# Patient Record
Sex: FEMALE | Race: WHITE | NOT HISPANIC OR LATINO | Employment: FULL TIME | URBAN - METROPOLITAN AREA
[De-identification: names, ages, dates, MRNs, and addresses within clinical notes are randomized per-mention and may not be internally consistent; named-entity substitution may affect disease eponyms.]

---

## 2019-06-12 ENCOUNTER — CLINICAL SUPPORT (OUTPATIENT)
Dept: URGENT CARE | Facility: CLINIC | Age: 59
End: 2019-06-12

## 2019-06-12 VITALS
BODY MASS INDEX: 28.93 KG/M2 | OXYGEN SATURATION: 97 % | TEMPERATURE: 98 F | WEIGHT: 180 LBS | HEIGHT: 66 IN | DIASTOLIC BLOOD PRESSURE: 102 MMHG | HEART RATE: 80 BPM | SYSTOLIC BLOOD PRESSURE: 184 MMHG | RESPIRATION RATE: 17 BRPM

## 2019-06-12 DIAGNOSIS — J32.4 CHRONIC PANSINUSITIS: Primary | ICD-10-CM

## 2019-06-12 PROCEDURE — 96372 PR INJECTION,THERAP/PROPH/DIAG2ST, IM OR SUBCUT: ICD-10-PCS | Mod: S$GLB,,, | Performed by: NURSE PRACTITIONER

## 2019-06-12 PROCEDURE — 99203 OFFICE O/P NEW LOW 30 MIN: CPT | Mod: 25,S$GLB,, | Performed by: NURSE PRACTITIONER

## 2019-06-12 PROCEDURE — 96372 THER/PROPH/DIAG INJ SC/IM: CPT | Mod: S$GLB,,, | Performed by: NURSE PRACTITIONER

## 2019-06-12 PROCEDURE — 99203 PR OFFICE/OUTPT VISIT, NEW, LEVL III, 30-44 MIN: ICD-10-PCS | Mod: 25,S$GLB,, | Performed by: NURSE PRACTITIONER

## 2019-06-12 RX ORDER — OMEPRAZOLE 40 MG/1
40 CAPSULE, DELAYED RELEASE ORAL DAILY
Status: ON HOLD | COMMUNITY
End: 2023-01-05 | Stop reason: HOSPADM

## 2019-06-12 RX ORDER — ATENOLOL 100 MG/1
100 TABLET ORAL DAILY
Status: ON HOLD | COMMUNITY
End: 2023-01-05 | Stop reason: HOSPADM

## 2019-06-12 RX ORDER — CEFTRIAXONE 1 G/1
1 INJECTION, POWDER, FOR SOLUTION INTRAMUSCULAR; INTRAVENOUS
Status: COMPLETED | OUTPATIENT
Start: 2019-06-12 | End: 2019-06-12

## 2019-06-12 RX ORDER — CIPROFLOXACIN 500 MG/1
500 TABLET ORAL EVERY 12 HOURS
Qty: 14 TABLET | Refills: 0 | Status: SHIPPED | OUTPATIENT
Start: 2019-06-12 | End: 2019-06-19

## 2019-06-12 RX ORDER — LISINOPRIL 30 MG/1
30 TABLET ORAL DAILY
Status: ON HOLD | COMMUNITY
End: 2023-01-05 | Stop reason: HOSPADM

## 2019-06-12 RX ORDER — GLIPIZIDE 5 MG/1
5 TABLET ORAL
Status: ON HOLD | COMMUNITY
End: 2023-01-05 | Stop reason: HOSPADM

## 2019-06-12 RX ADMIN — CEFTRIAXONE 1 G: 1 INJECTION, POWDER, FOR SOLUTION INTRAMUSCULAR; INTRAVENOUS at 05:06

## 2019-06-12 NOTE — PROGRESS NOTES
"Subjective:       Patient ID: Doris Hoover is a 58 y.o. female.    Vitals:  height is 5' 5.5" (1.664 m) and weight is 81.6 kg (180 lb). Her temperature is 97.7 °F (36.5 °C). Her blood pressure is 184/102 (abnormal) and her pulse is 80. Her respiration is 17 and oxygen saturation is 97%.     Chief Complaint: Sinusitis    Sinusitis   This is a new problem. Episode onset: Monday. The problem has been rapidly worsening since onset. Associated symptoms include congestion, ear pain, headaches, sinus pressure and a sore throat. Pertinent negatives include no chills, coughing or shortness of breath.       Constitution: Positive for fatigue. Negative for chills and fever.   HENT: Positive for ear pain, congestion, postnasal drip, sinus pain, sinus pressure and sore throat.    Neck: Negative for painful lymph nodes.   Cardiovascular: Negative for chest pain and leg swelling.   Eyes: Negative for double vision and blurred vision.   Respiratory: Negative for cough and shortness of breath.    Gastrointestinal: Negative for nausea, vomiting and diarrhea.   Genitourinary: Negative for dysuria, frequency, urgency and history of kidney stones.   Musculoskeletal: Negative for joint pain, joint swelling, muscle cramps and muscle ache.   Skin: Negative for color change, pale, rash and bruising.   Allergic/Immunologic: Negative for seasonal allergies.   Neurological: Positive for headaches. Negative for dizziness, history of vertigo, light-headedness and passing out.   Hematologic/Lymphatic: Negative for swollen lymph nodes.   Psychiatric/Behavioral: Negative for nervous/anxious, sleep disturbance and depression. The patient is not nervous/anxious.        Objective:      Physical Exam   Constitutional: She is oriented to person, place, and time. Vital signs are normal. She appears well-developed and well-nourished. She is cooperative.   HENT:   Head: Normocephalic.   Right Ear: Hearing, external ear and ear canal normal. A middle " ear effusion is present.   Left Ear: Hearing, external ear and ear canal normal. A middle ear effusion is present.   Nose: Mucosal edema and rhinorrhea present. Right sinus exhibits maxillary sinus tenderness. Left sinus exhibits maxillary sinus tenderness.   Mouth/Throat: Uvula is midline and mucous membranes are normal. Posterior oropharyngeal erythema present.   Eyes: Pupils are equal, round, and reactive to light. Conjunctivae, EOM and lids are normal.   Neck: Trachea normal, normal range of motion, full passive range of motion without pain and phonation normal. Neck supple.   Cardiovascular: Normal rate, regular rhythm, normal heart sounds, intact distal pulses and normal pulses.   Pulmonary/Chest: Effort normal and breath sounds normal.   Abdominal: Soft. Normal appearance, normal aorta and bowel sounds are normal. There is no tenderness.   Musculoskeletal: Normal range of motion.   Neurological: She is alert and oriented to person, place, and time. She has normal strength. GCS eye subscore is 4. GCS verbal subscore is 5. GCS motor subscore is 6.   Skin: Skin is warm, dry and intact. Capillary refill takes less than 2 seconds.   Psychiatric: She has a normal mood and affect. Her speech is normal and behavior is normal. Judgment and thought content normal. Cognition and memory are normal.       Assessment:       1. Chronic pansinusitis        Plan:     hx of chronic sinusitis, typically treated with rocephin and cipro. Has close follow up with ENT next week to discuss surgery    Chronic pansinusitis    Other orders  -     cefTRIAXone injection 1 g  -     ciprofloxacin HCl (CIPRO) 500 MG tablet; Take 1 tablet (500 mg total) by mouth every 12 (twelve) hours. for 7 days  Dispense: 14 tablet; Refill: 0

## 2019-06-12 NOTE — PATIENT INSTRUCTIONS
Chronic Sinusitis  Chronic sinusitis is a long-term swelling or infection of the sinuses. If sinusitis lasts more than 12 weeks, it is called chronic.    What is chronic sinusitis?  Sinuses are air-filled spaces in the skull behind the face. They are kept moist and clean by a lining of mucosa. Things such as pollen, smoke, and chemical fumes can irritate the mucosa. Constant exposure to irritants can cause ongoing inflammation of this lining. It can also damage tiny hairlike cilia that cover the mucosa. Cilia help carry mucus toward the opening of the sinus. Damage to cilia keeps mucus from draining from the sinuses.  Causes of chronic sinusitis  Problems that irritate the mucosa or block drainage can lead to chronic sinusitis. These may include:  · Infections  · Chronic allergies  · Nasal polyps, deviated septum, or other blockages  · Constant exposure to irritants, such as cigarette smoke or fumes  · Asthma  · Acute sinusitis that keeps coming back  Common symptoms of chronic sinusitis  Symptoms may include:  · Facial pain and pressure  · Headache and sinus pain  · Nasal congestion  · Thick, colored drainage from the nose  · Thick mucus draining down the back of the throat (postnasal drainage)  · Loss of smell  · Fever  · Cough  · Sore throat  Diagnosing chronic sinusitis  Your doctor will ask about your symptoms and health history. The doctor will look at your nose and face. You may have imaging studies such as an X-ray or CT scan of the sinuses. Your doctor may also take a sample of the drainage to check for bacteria. You may also have an endoscopy. During this test, the doctor puts a lighted tube through your nose up into your sinuses to look at the sinuses.  Treating chronic sinusitis  Treatment involves reducing irritation and inflammation. Your plan may include:  · Taking medicines. Your doctor may prescribe medicines to reduce the amount of mucus and swelling. These help unblock the sinuses and allow them  to drain. You will need to take antibiotics if you have a bacterial infection.  · Flushing your sinuses. Your doctor may suggest sinus irrigation. This is flushing your sinuses with saltwater or saline solution. This helps to clear out mucus.  · Controlling allergies. If you have allergies, you should have a plan to help control them. This plan may include medicines or allergy shots.  · Having surgery. In some cases, you may need surgery on the nose, sinuses, or both. This can improve sinus drainage or remove nasal blockages.  Date Last Reviewed: 10/1/2016  © 4152-2827 [employer]. [address] [place] [address], [place] [address]. All rights reserved. This information is not intended as a substitute for professional medical care. Always follow your healthcare professional's instructions.

## 2021-06-03 ENCOUNTER — OFFICE VISIT (OUTPATIENT)
Dept: URGENT CARE | Facility: CLINIC | Age: 61
End: 2021-06-03
Payer: COMMERCIAL

## 2021-06-03 VITALS
SYSTOLIC BLOOD PRESSURE: 136 MMHG | BODY MASS INDEX: 29.16 KG/M2 | HEIGHT: 65 IN | TEMPERATURE: 98 F | OXYGEN SATURATION: 97 % | DIASTOLIC BLOOD PRESSURE: 88 MMHG | HEART RATE: 87 BPM | WEIGHT: 175 LBS | RESPIRATION RATE: 17 BRPM

## 2021-06-03 DIAGNOSIS — J32.0 MAXILLARY SINUSITIS, UNSPECIFIED CHRONICITY: Primary | ICD-10-CM

## 2021-06-03 PROBLEM — I34.1 MVP (MITRAL VALVE PROLAPSE): Status: ACTIVE | Noted: 2017-11-15

## 2021-06-03 PROBLEM — M25.511 ACUTE PAIN OF RIGHT SHOULDER: Status: ACTIVE | Noted: 2020-08-28

## 2021-06-03 PROBLEM — E11.9 TYPE 2 DIABETES MELLITUS, WITHOUT LONG-TERM CURRENT USE OF INSULIN: Status: ACTIVE | Noted: 2017-11-15

## 2021-06-03 PROBLEM — I10 ESSENTIAL HYPERTENSION: Status: ACTIVE | Noted: 2017-11-15

## 2021-06-03 PROBLEM — K21.9 GASTROESOPHAGEAL REFLUX DISEASE WITHOUT ESOPHAGITIS: Status: ACTIVE | Noted: 2017-11-15

## 2021-06-03 PROBLEM — J01.00 ACUTE MAXILLARY SINUSITIS: Status: ACTIVE | Noted: 2020-08-28

## 2021-06-03 PROCEDURE — 3008F BODY MASS INDEX DOCD: CPT | Mod: S$GLB,,, | Performed by: NURSE PRACTITIONER

## 2021-06-03 PROCEDURE — 99214 OFFICE O/P EST MOD 30 MIN: CPT | Mod: S$GLB,,, | Performed by: NURSE PRACTITIONER

## 2021-06-03 PROCEDURE — 99214 PR OFFICE/OUTPT VISIT, EST, LEVL IV, 30-39 MIN: ICD-10-PCS | Mod: S$GLB,,, | Performed by: NURSE PRACTITIONER

## 2021-06-03 PROCEDURE — 3008F PR BODY MASS INDEX (BMI) DOCUMENTED: ICD-10-PCS | Mod: S$GLB,,, | Performed by: NURSE PRACTITIONER

## 2021-06-03 RX ORDER — AMLODIPINE BESYLATE 10 MG/1
10 TABLET ORAL DAILY
COMMUNITY
Start: 2020-07-13

## 2021-06-03 RX ORDER — CIPROFLOXACIN 500 MG/1
500 TABLET ORAL EVERY 12 HOURS
Qty: 20 TABLET | Refills: 0 | Status: ON HOLD | OUTPATIENT
Start: 2021-06-03 | End: 2023-01-05 | Stop reason: HOSPADM

## 2021-06-03 RX ORDER — SEMAGLUTIDE 1.34 MG/ML
1 INJECTION, SOLUTION SUBCUTANEOUS
COMMUNITY
Start: 2020-08-28

## 2021-07-27 DIAGNOSIS — Z12.31 ENCOUNTER FOR SCREENING MAMMOGRAM FOR MALIGNANT NEOPLASM OF BREAST: Primary | ICD-10-CM

## 2021-08-15 ENCOUNTER — HOSPITAL ENCOUNTER (EMERGENCY)
Facility: HOSPITAL | Age: 61
Discharge: HOME | End: 2021-08-15
Attending: EMERGENCY MEDICINE
Payer: COMMERCIAL

## 2021-08-15 VITALS
RESPIRATION RATE: 16 BRPM | DIASTOLIC BLOOD PRESSURE: 68 MMHG | WEIGHT: 175 LBS | TEMPERATURE: 98 F | OXYGEN SATURATION: 98 % | BODY MASS INDEX: 28.12 KG/M2 | SYSTOLIC BLOOD PRESSURE: 128 MMHG | HEART RATE: 84 BPM | HEIGHT: 66 IN

## 2021-08-15 DIAGNOSIS — S20.219A CHEST WALL CONTUSION, UNSPECIFIED LATERALITY, INITIAL ENCOUNTER: Primary | ICD-10-CM

## 2021-08-15 LAB
ALBUMIN SERPL BCP-MCNC: 4.2 G/DL (ref 3.5–5.2)
ALP SERPL-CCNC: 80 U/L (ref 55–135)
ALT SERPL W/O P-5'-P-CCNC: 12 U/L (ref 10–44)
ANION GAP SERPL CALC-SCNC: 7 MMOL/L (ref 8–16)
AST SERPL-CCNC: 19 U/L (ref 10–40)
BASOPHILS # BLD AUTO: 0.03 K/UL (ref 0–0.2)
BASOPHILS NFR BLD: 0.4 % (ref 0–1.9)
BILIRUB SERPL-MCNC: 0.9 MG/DL (ref 0.1–1)
BILIRUB UR QL STRIP: NEGATIVE
BUN SERPL-MCNC: 15 MG/DL (ref 8–23)
CALCIUM SERPL-MCNC: 9.3 MG/DL (ref 8.7–10.5)
CHLORIDE SERPL-SCNC: 109 MMOL/L (ref 95–110)
CLARITY UR: CLEAR
CO2 SERPL-SCNC: 26 MMOL/L (ref 23–29)
COLOR UR: YELLOW
CREAT SERPL-MCNC: 1 MG/DL (ref 0.5–1.4)
DIFFERENTIAL METHOD: NORMAL
EOSINOPHIL # BLD AUTO: 0.1 K/UL (ref 0–0.5)
EOSINOPHIL NFR BLD: 1.4 % (ref 0–8)
ERYTHROCYTE [DISTWIDTH] IN BLOOD BY AUTOMATED COUNT: 13.5 % (ref 11.5–14.5)
EST. GFR  (AFRICAN AMERICAN): >60 ML/MIN/1.73 M^2
EST. GFR  (NON AFRICAN AMERICAN): >60 ML/MIN/1.73 M^2
GLUCOSE SERPL-MCNC: 93 MG/DL (ref 70–110)
GLUCOSE UR QL STRIP: NEGATIVE
HCT VFR BLD AUTO: 39.4 % (ref 37–48.5)
HGB BLD-MCNC: 12.7 G/DL (ref 12–16)
HGB UR QL STRIP: NEGATIVE
IMM GRANULOCYTES # BLD AUTO: 0.02 K/UL (ref 0–0.04)
IMM GRANULOCYTES NFR BLD AUTO: 0.2 % (ref 0–0.5)
KETONES UR QL STRIP: NEGATIVE
LEUKOCYTE ESTERASE UR QL STRIP: NEGATIVE
LYMPHOCYTES # BLD AUTO: 3.4 K/UL (ref 1–4.8)
LYMPHOCYTES NFR BLD: 40.2 % (ref 18–48)
MCH RBC QN AUTO: 29.4 PG (ref 27–31)
MCHC RBC AUTO-ENTMCNC: 32.2 G/DL (ref 32–36)
MCV RBC AUTO: 91 FL (ref 82–98)
MONOCYTES # BLD AUTO: 0.8 K/UL (ref 0.3–1)
MONOCYTES NFR BLD: 9.6 % (ref 4–15)
NEUTROPHILS # BLD AUTO: 4.1 K/UL (ref 1.8–7.7)
NEUTROPHILS NFR BLD: 48.2 % (ref 38–73)
NITRITE UR QL STRIP: NEGATIVE
NRBC BLD-RTO: 0 /100 WBC
PH UR STRIP: 7 [PH] (ref 5–8)
PLATELET # BLD AUTO: 250 K/UL (ref 150–450)
PMV BLD AUTO: 10.9 FL (ref 9.2–12.9)
POTASSIUM SERPL-SCNC: 3.8 MMOL/L (ref 3.5–5.1)
PROT SERPL-MCNC: 7.4 G/DL (ref 6–8.4)
PROT UR QL STRIP: NEGATIVE
RBC # BLD AUTO: 4.32 M/UL (ref 4–5.4)
SODIUM SERPL-SCNC: 142 MMOL/L (ref 136–145)
SP GR UR STRIP: 1.02 (ref 1–1.03)
URN SPEC COLLECT METH UR: NORMAL
UROBILINOGEN UR STRIP-ACNC: NEGATIVE EU/DL
WBC # BLD AUTO: 8.48 K/UL (ref 3.9–12.7)

## 2021-08-15 PROCEDURE — 25000003 PHARM REV CODE 250: Performed by: EMERGENCY MEDICINE

## 2021-08-15 PROCEDURE — 81003 URINALYSIS AUTO W/O SCOPE: CPT | Performed by: EMERGENCY MEDICINE

## 2021-08-15 PROCEDURE — 25500020 PHARM REV CODE 255: Performed by: EMERGENCY MEDICINE

## 2021-08-15 PROCEDURE — 99285 EMERGENCY DEPT VISIT HI MDM: CPT

## 2021-08-15 PROCEDURE — 85025 COMPLETE CBC W/AUTO DIFF WBC: CPT | Performed by: EMERGENCY MEDICINE

## 2021-08-15 PROCEDURE — 80053 COMPREHEN METABOLIC PANEL: CPT | Performed by: EMERGENCY MEDICINE

## 2021-08-15 RX ORDER — METHOCARBAMOL 500 MG/1
500 TABLET, FILM COATED ORAL EVERY 8 HOURS PRN
Qty: 8 TABLET | Refills: 0 | Status: SHIPPED | OUTPATIENT
Start: 2021-08-15 | End: 2021-08-15 | Stop reason: ALTCHOICE

## 2021-08-15 RX ORDER — ACETAMINOPHEN 500 MG
1000 TABLET ORAL
Status: COMPLETED | OUTPATIENT
Start: 2021-08-15 | End: 2021-08-15

## 2021-08-15 RX ORDER — MULTIVITAMIN
1 TABLET ORAL DAILY
COMMUNITY

## 2021-08-15 RX ORDER — PANTOPRAZOLE SODIUM 40 MG/1
40 TABLET, DELAYED RELEASE ORAL DAILY
COMMUNITY

## 2021-08-15 RX ORDER — VITAMIN B COMPLEX
1 CAPSULE ORAL DAILY
Status: ON HOLD | COMMUNITY
End: 2023-01-05 | Stop reason: HOSPADM

## 2021-08-15 RX ORDER — METHOCARBAMOL 500 MG/1
500 TABLET, FILM COATED ORAL EVERY 8 HOURS PRN
Qty: 8 TABLET | Refills: 0 | Status: SHIPPED | OUTPATIENT
Start: 2021-08-15 | End: 2021-08-20

## 2021-08-15 RX ORDER — TIZANIDINE 4 MG/1
4 TABLET ORAL EVERY 8 HOURS
Status: ON HOLD | COMMUNITY
End: 2023-01-05 | Stop reason: HOSPADM

## 2021-08-15 RX ORDER — GLIMEPIRIDE 4 MG/1
4 TABLET ORAL
Status: ON HOLD | COMMUNITY
End: 2023-01-05 | Stop reason: HOSPADM

## 2021-08-15 RX ORDER — DEXTROMETHORPHAN HYDROBROMIDE, GUAIFENESIN 5; 100 MG/5ML; MG/5ML
650 LIQUID ORAL EVERY 8 HOURS
COMMUNITY

## 2021-08-15 RX ADMIN — ACETAMINOPHEN 1000 MG: 500 TABLET ORAL at 06:08

## 2021-08-15 RX ADMIN — IOHEXOL 100 ML: 350 INJECTION, SOLUTION INTRAVENOUS at 07:08

## 2021-09-06 PROBLEM — J01.00 ACUTE MAXILLARY SINUSITIS: Status: RESOLVED | Noted: 2020-08-28 | Resolved: 2021-09-06

## 2021-10-10 ENCOUNTER — OFFICE VISIT (OUTPATIENT)
Dept: URGENT CARE | Facility: CLINIC | Age: 61
End: 2021-10-10
Payer: COMMERCIAL

## 2021-10-10 VITALS
BODY MASS INDEX: 28.12 KG/M2 | OXYGEN SATURATION: 98 % | WEIGHT: 175 LBS | TEMPERATURE: 98 F | SYSTOLIC BLOOD PRESSURE: 152 MMHG | HEIGHT: 66 IN | HEART RATE: 74 BPM | DIASTOLIC BLOOD PRESSURE: 90 MMHG

## 2021-10-10 DIAGNOSIS — J01.11 ACUTE RECURRENT FRONTAL SINUSITIS: Primary | ICD-10-CM

## 2021-10-10 PROCEDURE — 4010F ACE/ARB THERAPY RXD/TAKEN: CPT | Mod: S$GLB,,, | Performed by: NURSE PRACTITIONER

## 2021-10-10 PROCEDURE — 1159F MED LIST DOCD IN RCRD: CPT | Mod: S$GLB,,, | Performed by: NURSE PRACTITIONER

## 2021-10-10 PROCEDURE — 96372 THER/PROPH/DIAG INJ SC/IM: CPT | Mod: S$GLB,,, | Performed by: NURSE PRACTITIONER

## 2021-10-10 PROCEDURE — 3077F PR MOST RECENT SYSTOLIC BLOOD PRESSURE >= 140 MM HG: ICD-10-PCS | Mod: S$GLB,,, | Performed by: NURSE PRACTITIONER

## 2021-10-10 PROCEDURE — 96372 PR INJECTION,THERAP/PROPH/DIAG2ST, IM OR SUBCUT: ICD-10-PCS | Mod: S$GLB,,, | Performed by: NURSE PRACTITIONER

## 2021-10-10 PROCEDURE — 99213 OFFICE O/P EST LOW 20 MIN: CPT | Mod: 25,S$GLB,, | Performed by: NURSE PRACTITIONER

## 2021-10-10 PROCEDURE — 3008F PR BODY MASS INDEX (BMI) DOCUMENTED: ICD-10-PCS | Mod: S$GLB,,, | Performed by: NURSE PRACTITIONER

## 2021-10-10 PROCEDURE — 3077F SYST BP >= 140 MM HG: CPT | Mod: S$GLB,,, | Performed by: NURSE PRACTITIONER

## 2021-10-10 PROCEDURE — 3008F BODY MASS INDEX DOCD: CPT | Mod: S$GLB,,, | Performed by: NURSE PRACTITIONER

## 2021-10-10 PROCEDURE — 99213 PR OFFICE/OUTPT VISIT, EST, LEVL III, 20-29 MIN: ICD-10-PCS | Mod: 25,S$GLB,, | Performed by: NURSE PRACTITIONER

## 2021-10-10 PROCEDURE — 1160F RVW MEDS BY RX/DR IN RCRD: CPT | Mod: S$GLB,,, | Performed by: NURSE PRACTITIONER

## 2021-10-10 PROCEDURE — 3080F DIAST BP >= 90 MM HG: CPT | Mod: S$GLB,,, | Performed by: NURSE PRACTITIONER

## 2021-10-10 PROCEDURE — 3080F PR MOST RECENT DIASTOLIC BLOOD PRESSURE >= 90 MM HG: ICD-10-PCS | Mod: S$GLB,,, | Performed by: NURSE PRACTITIONER

## 2021-10-10 PROCEDURE — 1160F PR REVIEW ALL MEDS BY PRESCRIBER/CLIN PHARMACIST DOCUMENTED: ICD-10-PCS | Mod: S$GLB,,, | Performed by: NURSE PRACTITIONER

## 2021-10-10 PROCEDURE — 4010F PR ACE/ARB THEARPY RXD/TAKEN: ICD-10-PCS | Mod: S$GLB,,, | Performed by: NURSE PRACTITIONER

## 2021-10-10 PROCEDURE — 1159F PR MEDICATION LIST DOCUMENTED IN MEDICAL RECORD: ICD-10-PCS | Mod: S$GLB,,, | Performed by: NURSE PRACTITIONER

## 2021-10-10 RX ORDER — CEFTRIAXONE 1 G/1
1 INJECTION, POWDER, FOR SOLUTION INTRAMUSCULAR; INTRAVENOUS
Status: COMPLETED | OUTPATIENT
Start: 2021-10-10 | End: 2021-10-10

## 2021-10-10 RX ORDER — DOXYCYCLINE HYCLATE 100 MG
100 TABLET ORAL 2 TIMES DAILY
Qty: 20 TABLET | Refills: 0 | Status: SHIPPED | OUTPATIENT
Start: 2021-10-10 | End: 2021-10-20

## 2021-10-10 RX ADMIN — CEFTRIAXONE 1 G: 1 INJECTION, POWDER, FOR SOLUTION INTRAMUSCULAR; INTRAVENOUS at 10:10

## 2022-12-30 ENCOUNTER — HOSPITAL ENCOUNTER (INPATIENT)
Facility: HOSPITAL | Age: 62
LOS: 6 days | Discharge: HOME | DRG: 193 | End: 2023-01-05
Attending: EMERGENCY MEDICINE | Admitting: INTERNAL MEDICINE
Payer: COMMERCIAL

## 2022-12-30 DIAGNOSIS — Z99.81 HYPOXEMIA REQUIRING SUPPLEMENTAL OXYGEN: ICD-10-CM

## 2022-12-30 DIAGNOSIS — J18.9 PNEUMONIA OF RIGHT UPPER LOBE DUE TO INFECTIOUS ORGANISM: ICD-10-CM

## 2022-12-30 DIAGNOSIS — J18.9 PNEUMONIA DUE TO INFECTIOUS ORGANISM, UNSPECIFIED LATERALITY, UNSPECIFIED PART OF LUNG: Primary | ICD-10-CM

## 2022-12-30 DIAGNOSIS — J18.9 PNEUMONIA OF RIGHT LUNG DUE TO INFECTIOUS ORGANISM, UNSPECIFIED PART OF LUNG: ICD-10-CM

## 2022-12-30 DIAGNOSIS — R09.02 HYPOXEMIA REQUIRING SUPPLEMENTAL OXYGEN: ICD-10-CM

## 2022-12-30 DIAGNOSIS — R50.9 FEVER: ICD-10-CM

## 2022-12-30 DIAGNOSIS — E11.9 TYPE 2 DIABETES MELLITUS WITHOUT COMPLICATION, WITHOUT LONG-TERM CURRENT USE OF INSULIN: ICD-10-CM

## 2022-12-30 PROBLEM — J96.01 ACUTE HYPOXEMIC RESPIRATORY FAILURE: Status: ACTIVE | Noted: 2022-12-30

## 2022-12-30 LAB
ALBUMIN SERPL BCP-MCNC: 3.6 G/DL (ref 3.5–5.2)
ALBUMIN SERPL BCP-MCNC: 3.6 G/DL (ref 3.5–5.2)
ALP SERPL-CCNC: 65 U/L (ref 55–135)
ALP SERPL-CCNC: 65 U/L (ref 55–135)
ALT SERPL W/O P-5'-P-CCNC: 16 U/L (ref 10–44)
ALT SERPL W/O P-5'-P-CCNC: 16 U/L (ref 10–44)
AMPHET+METHAMPHET UR QL: NEGATIVE
ANION GAP SERPL CALC-SCNC: 9 MMOL/L (ref 8–16)
ANION GAP SERPL CALC-SCNC: 9 MMOL/L (ref 8–16)
AST SERPL-CCNC: 27 U/L (ref 10–40)
AST SERPL-CCNC: 27 U/L (ref 10–40)
BACTERIA #/AREA URNS HPF: NEGATIVE /HPF
BARBITURATES UR QL SCN>200 NG/ML: NEGATIVE
BASOPHILS # BLD AUTO: 0.03 K/UL (ref 0–0.2)
BASOPHILS # BLD AUTO: 0.03 K/UL (ref 0–0.2)
BASOPHILS NFR BLD: 0.4 % (ref 0–1.9)
BASOPHILS NFR BLD: 0.4 % (ref 0–1.9)
BENZODIAZ UR QL SCN>200 NG/ML: NEGATIVE
BILIRUB SERPL-MCNC: 1 MG/DL (ref 0.1–1)
BILIRUB SERPL-MCNC: 1 MG/DL (ref 0.1–1)
BILIRUB UR QL STRIP: NEGATIVE
BNP SERPL-MCNC: 250 PG/ML (ref 0–99)
BUN SERPL-MCNC: 15 MG/DL (ref 8–23)
BUN SERPL-MCNC: 15 MG/DL (ref 8–23)
BZE UR QL SCN: NEGATIVE
CALCIUM SERPL-MCNC: 8.4 MG/DL (ref 8.7–10.5)
CALCIUM SERPL-MCNC: 8.4 MG/DL (ref 8.7–10.5)
CANNABINOIDS UR QL SCN: NEGATIVE
CHLORIDE SERPL-SCNC: 97 MMOL/L (ref 95–110)
CHLORIDE SERPL-SCNC: 97 MMOL/L (ref 95–110)
CK SERPL-CCNC: 84 U/L (ref 20–180)
CLARITY UR: CLEAR
CO2 SERPL-SCNC: 26 MMOL/L (ref 23–29)
CO2 SERPL-SCNC: 26 MMOL/L (ref 23–29)
COLOR UR: YELLOW
CREAT SERPL-MCNC: 0.4 MG/DL (ref 0.5–1.4)
CREAT SERPL-MCNC: 0.8 MG/DL (ref 0.5–1.4)
CREAT SERPL-MCNC: 0.8 MG/DL (ref 0.5–1.4)
CREAT UR-MCNC: 109 MG/DL (ref 15–325)
DIFFERENTIAL METHOD: ABNORMAL
DIFFERENTIAL METHOD: ABNORMAL
EOSINOPHIL # BLD AUTO: 0 K/UL (ref 0–0.5)
EOSINOPHIL # BLD AUTO: 0 K/UL (ref 0–0.5)
EOSINOPHIL NFR BLD: 0.1 % (ref 0–8)
EOSINOPHIL NFR BLD: 0.1 % (ref 0–8)
ERYTHROCYTE [DISTWIDTH] IN BLOOD BY AUTOMATED COUNT: 13.2 % (ref 11.5–14.5)
ERYTHROCYTE [DISTWIDTH] IN BLOOD BY AUTOMATED COUNT: 13.2 % (ref 11.5–14.5)
EST. GFR  (NO RACE VARIABLE): >60 ML/MIN/1.73 M^2
EST. GFR  (NO RACE VARIABLE): >60 ML/MIN/1.73 M^2
GLUCOSE SERPL-MCNC: 139 MG/DL (ref 70–110)
GLUCOSE SERPL-MCNC: 139 MG/DL (ref 70–110)
GLUCOSE UR QL STRIP: NEGATIVE
HCT VFR BLD AUTO: 35.9 % (ref 37–48.5)
HCT VFR BLD AUTO: 35.9 % (ref 37–48.5)
HGB BLD-MCNC: 11.7 G/DL (ref 12–16)
HGB BLD-MCNC: 11.7 G/DL (ref 12–16)
HGB UR QL STRIP: NEGATIVE
HYALINE CASTS #/AREA URNS LPF: 27 /LPF
IMM GRANULOCYTES # BLD AUTO: 0.01 K/UL (ref 0–0.04)
IMM GRANULOCYTES # BLD AUTO: 0.01 K/UL (ref 0–0.04)
IMM GRANULOCYTES NFR BLD AUTO: 0.1 % (ref 0–0.5)
IMM GRANULOCYTES NFR BLD AUTO: 0.1 % (ref 0–0.5)
INFLUENZA A, MOLECULAR: NEGATIVE
INFLUENZA B, MOLECULAR: NEGATIVE
KETONES UR QL STRIP: ABNORMAL
LACTATE SERPL-SCNC: 1.6 MMOL/L (ref 0.5–1.9)
LEUKOCYTE ESTERASE UR QL STRIP: NEGATIVE
LIPASE SERPL-CCNC: 28 U/L (ref 4–60)
LYMPHOCYTES # BLD AUTO: 0.8 K/UL (ref 1–4.8)
LYMPHOCYTES # BLD AUTO: 0.8 K/UL (ref 1–4.8)
LYMPHOCYTES NFR BLD: 11.3 % (ref 18–48)
LYMPHOCYTES NFR BLD: 11.3 % (ref 18–48)
MAGNESIUM SERPL-MCNC: 1.8 MG/DL (ref 1.6–2.6)
MCH RBC QN AUTO: 30.2 PG (ref 27–31)
MCH RBC QN AUTO: 30.2 PG (ref 27–31)
MCHC RBC AUTO-ENTMCNC: 32.6 G/DL (ref 32–36)
MCHC RBC AUTO-ENTMCNC: 32.6 G/DL (ref 32–36)
MCV RBC AUTO: 93 FL (ref 82–98)
MCV RBC AUTO: 93 FL (ref 82–98)
MICROSCOPIC COMMENT: ABNORMAL
MONOCYTES # BLD AUTO: 0.6 K/UL (ref 0.3–1)
MONOCYTES # BLD AUTO: 0.6 K/UL (ref 0.3–1)
MONOCYTES NFR BLD: 8 % (ref 4–15)
MONOCYTES NFR BLD: 8 % (ref 4–15)
NEUTROPHILS # BLD AUTO: 5.9 K/UL (ref 1.8–7.7)
NEUTROPHILS # BLD AUTO: 5.9 K/UL (ref 1.8–7.7)
NEUTROPHILS NFR BLD: 80.1 % (ref 38–73)
NEUTROPHILS NFR BLD: 80.1 % (ref 38–73)
NITRITE UR QL STRIP: NEGATIVE
NRBC BLD-RTO: 0 /100 WBC
NRBC BLD-RTO: 0 /100 WBC
OPIATES UR QL SCN: ABNORMAL
PCP UR QL SCN>25 NG/ML: NEGATIVE
PH UR STRIP: 6 [PH] (ref 5–8)
PHOSPHATE SERPL-MCNC: 3.1 MG/DL (ref 2.7–4.5)
PLATELET # BLD AUTO: 177 K/UL (ref 150–450)
PLATELET # BLD AUTO: 177 K/UL (ref 150–450)
PMV BLD AUTO: 11.8 FL (ref 9.2–12.9)
PMV BLD AUTO: 11.8 FL (ref 9.2–12.9)
POTASSIUM SERPL-SCNC: 3.4 MMOL/L (ref 3.5–5.1)
POTASSIUM SERPL-SCNC: 3.4 MMOL/L (ref 3.5–5.1)
PROCALCITONIN SERPL IA-MCNC: <0.05 NG/ML (ref 0–0.5)
PROT SERPL-MCNC: 7 G/DL (ref 6–8.4)
PROT SERPL-MCNC: 7 G/DL (ref 6–8.4)
PROT UR QL STRIP: ABNORMAL
RBC # BLD AUTO: 3.87 M/UL (ref 4–5.4)
RBC # BLD AUTO: 3.87 M/UL (ref 4–5.4)
RBC #/AREA URNS HPF: 1 /HPF (ref 0–4)
SAMPLE: ABNORMAL
SARS-COV-2 RDRP RESP QL NAA+PROBE: NEGATIVE
SODIUM SERPL-SCNC: 132 MMOL/L (ref 136–145)
SODIUM SERPL-SCNC: 132 MMOL/L (ref 136–145)
SP GR UR STRIP: >1.03 (ref 1–1.03)
SPECIMEN SOURCE: NORMAL
SQUAMOUS #/AREA URNS HPF: 2 /HPF
TOXICOLOGY INFORMATION: ABNORMAL
TROPONIN I SERPL HS-MCNC: 42.6 PG/ML (ref 0–14.9)
TROPONIN I SERPL HS-MCNC: 43.4 PG/ML (ref 0–14.9)
TSH SERPL DL<=0.005 MIU/L-ACNC: 1.18 UIU/ML (ref 0.34–5.6)
URN SPEC COLLECT METH UR: ABNORMAL
UROBILINOGEN UR STRIP-ACNC: NEGATIVE EU/DL
WBC # BLD AUTO: 7.42 K/UL (ref 3.9–12.7)
WBC # BLD AUTO: 7.42 K/UL (ref 3.9–12.7)
WBC #/AREA URNS HPF: 22 /HPF (ref 0–5)

## 2022-12-30 PROCEDURE — 82550 ASSAY OF CK (CPK): CPT | Performed by: EMERGENCY MEDICINE

## 2022-12-30 PROCEDURE — 99900031 HC PATIENT EDUCATION (STAT)

## 2022-12-30 PROCEDURE — 87040 BLOOD CULTURE FOR BACTERIA: CPT | Performed by: EMERGENCY MEDICINE

## 2022-12-30 PROCEDURE — 25000003 PHARM REV CODE 250: Performed by: EMERGENCY MEDICINE

## 2022-12-30 PROCEDURE — 99285 EMERGENCY DEPT VISIT HI MDM: CPT | Mod: 25

## 2022-12-30 PROCEDURE — 93010 ELECTROCARDIOGRAM REPORT: CPT | Mod: ,,, | Performed by: SPECIALIST

## 2022-12-30 PROCEDURE — 87502 INFLUENZA DNA AMP PROBE: CPT | Performed by: EMERGENCY MEDICINE

## 2022-12-30 PROCEDURE — 94799 UNLISTED PULMONARY SVC/PX: CPT

## 2022-12-30 PROCEDURE — 25500020 PHARM REV CODE 255: Performed by: EMERGENCY MEDICINE

## 2022-12-30 PROCEDURE — 83690 ASSAY OF LIPASE: CPT | Performed by: EMERGENCY MEDICINE

## 2022-12-30 PROCEDURE — 87086 URINE CULTURE/COLONY COUNT: CPT | Performed by: EMERGENCY MEDICINE

## 2022-12-30 PROCEDURE — 83735 ASSAY OF MAGNESIUM: CPT | Performed by: EMERGENCY MEDICINE

## 2022-12-30 PROCEDURE — 84484 ASSAY OF TROPONIN QUANT: CPT | Performed by: EMERGENCY MEDICINE

## 2022-12-30 PROCEDURE — 63600175 PHARM REV CODE 636 W HCPCS: Performed by: EMERGENCY MEDICINE

## 2022-12-30 PROCEDURE — 83605 ASSAY OF LACTIC ACID: CPT | Performed by: EMERGENCY MEDICINE

## 2022-12-30 PROCEDURE — 93005 ELECTROCARDIOGRAM TRACING: CPT | Performed by: SPECIALIST

## 2022-12-30 PROCEDURE — 83880 ASSAY OF NATRIURETIC PEPTIDE: CPT | Performed by: EMERGENCY MEDICINE

## 2022-12-30 PROCEDURE — 94640 AIRWAY INHALATION TREATMENT: CPT

## 2022-12-30 PROCEDURE — 99900035 HC TECH TIME PER 15 MIN (STAT)

## 2022-12-30 PROCEDURE — 82962 GLUCOSE BLOOD TEST: CPT

## 2022-12-30 PROCEDURE — 81001 URINALYSIS AUTO W/SCOPE: CPT | Performed by: EMERGENCY MEDICINE

## 2022-12-30 PROCEDURE — 85025 COMPLETE CBC W/AUTO DIFF WBC: CPT | Performed by: EMERGENCY MEDICINE

## 2022-12-30 PROCEDURE — 84145 PROCALCITONIN (PCT): CPT | Performed by: EMERGENCY MEDICINE

## 2022-12-30 PROCEDURE — 84100 ASSAY OF PHOSPHORUS: CPT | Performed by: EMERGENCY MEDICINE

## 2022-12-30 PROCEDURE — 80307 DRUG TEST PRSMV CHEM ANLYZR: CPT | Performed by: EMERGENCY MEDICINE

## 2022-12-30 PROCEDURE — 96365 THER/PROPH/DIAG IV INF INIT: CPT

## 2022-12-30 PROCEDURE — 25000242 PHARM REV CODE 250 ALT 637 W/ HCPCS: Performed by: EMERGENCY MEDICINE

## 2022-12-30 PROCEDURE — 84443 ASSAY THYROID STIM HORMONE: CPT | Performed by: EMERGENCY MEDICINE

## 2022-12-30 PROCEDURE — U0002 COVID-19 LAB TEST NON-CDC: HCPCS | Performed by: EMERGENCY MEDICINE

## 2022-12-30 PROCEDURE — 12000002 HC ACUTE/MED SURGE SEMI-PRIVATE ROOM

## 2022-12-30 PROCEDURE — 93010 EKG 12-LEAD: ICD-10-PCS | Mod: ,,, | Performed by: SPECIALIST

## 2022-12-30 PROCEDURE — 80053 COMPREHEN METABOLIC PANEL: CPT | Performed by: EMERGENCY MEDICINE

## 2022-12-30 RX ORDER — MEROPENEM AND SODIUM CHLORIDE 1 G/50ML
1 INJECTION, SOLUTION INTRAVENOUS ONCE
Status: DISCONTINUED | OUTPATIENT
Start: 2022-12-30 | End: 2022-12-30

## 2022-12-30 RX ORDER — PROMETHAZINE HYDROCHLORIDE AND DEXTROMETHORPHAN HYDROBROMIDE 6.25; 15 MG/5ML; MG/5ML
5-10 SYRUP ORAL EVERY 6 HOURS PRN
Status: ON HOLD | COMMUNITY
Start: 2022-12-28 | End: 2023-01-05 | Stop reason: SDUPTHER

## 2022-12-30 RX ORDER — LANOLIN ALCOHOL/MO/W.PET/CERES
800 CREAM (GRAM) TOPICAL
Status: DISCONTINUED | OUTPATIENT
Start: 2022-12-31 | End: 2023-01-05 | Stop reason: HOSPADM

## 2022-12-30 RX ORDER — ENOXAPARIN SODIUM 100 MG/ML
40 INJECTION SUBCUTANEOUS
Status: DISCONTINUED | OUTPATIENT
Start: 2022-12-31 | End: 2023-01-05 | Stop reason: HOSPADM

## 2022-12-30 RX ORDER — INSULIN ASPART 100 [IU]/ML
1-10 INJECTION, SOLUTION INTRAVENOUS; SUBCUTANEOUS
Status: DISCONTINUED | OUTPATIENT
Start: 2022-12-31 | End: 2023-01-05 | Stop reason: HOSPADM

## 2022-12-30 RX ORDER — BENZONATATE 100 MG/1
100 CAPSULE ORAL 3 TIMES DAILY PRN
Status: DISCONTINUED | OUTPATIENT
Start: 2022-12-31 | End: 2023-01-05 | Stop reason: HOSPADM

## 2022-12-30 RX ORDER — PANTOPRAZOLE SODIUM 40 MG/1
40 TABLET, DELAYED RELEASE ORAL DAILY
Status: DISCONTINUED | OUTPATIENT
Start: 2022-12-31 | End: 2023-01-05 | Stop reason: HOSPADM

## 2022-12-30 RX ORDER — FAMOTIDINE 20 MG/1
20 TABLET, FILM COATED ORAL 2 TIMES DAILY
Status: DISCONTINUED | OUTPATIENT
Start: 2022-12-31 | End: 2022-12-30

## 2022-12-30 RX ORDER — CEFEPIME HYDROCHLORIDE 1 G/50ML
2 INJECTION, SOLUTION INTRAVENOUS
Status: DISCONTINUED | OUTPATIENT
Start: 2022-12-31 | End: 2022-12-30

## 2022-12-30 RX ORDER — MAGNESIUM SULFATE 1 G/100ML
1 INJECTION INTRAVENOUS ONCE
Status: COMPLETED | OUTPATIENT
Start: 2022-12-30 | End: 2022-12-30

## 2022-12-30 RX ORDER — IBUPROFEN 200 MG
24 TABLET ORAL
Status: DISCONTINUED | OUTPATIENT
Start: 2022-12-31 | End: 2023-01-05 | Stop reason: HOSPADM

## 2022-12-30 RX ORDER — GLUCAGON 1 MG
1 KIT INJECTION
Status: DISCONTINUED | OUTPATIENT
Start: 2022-12-31 | End: 2023-01-05 | Stop reason: HOSPADM

## 2022-12-30 RX ORDER — GLIPIZIDE 5 MG/1
5 TABLET ORAL DAILY
COMMUNITY
Start: 2022-11-29

## 2022-12-30 RX ORDER — ACETAMINOPHEN 325 MG/1
650 TABLET ORAL EVERY 4 HOURS PRN
Status: DISCONTINUED | OUTPATIENT
Start: 2022-12-31 | End: 2023-01-05 | Stop reason: HOSPADM

## 2022-12-30 RX ORDER — LEVALBUTEROL INHALATION SOLUTION 0.63 MG/3ML
0.63 SOLUTION RESPIRATORY (INHALATION)
Status: COMPLETED | OUTPATIENT
Start: 2022-12-30 | End: 2022-12-30

## 2022-12-30 RX ORDER — LEVOFLOXACIN 5 MG/ML
750 INJECTION, SOLUTION INTRAVENOUS
Status: DISCONTINUED | OUTPATIENT
Start: 2022-12-31 | End: 2022-12-31

## 2022-12-30 RX ORDER — VIT C/E/ZN/COPPR/LUTEIN/ZEAXAN 250MG-90MG
1000 CAPSULE ORAL DAILY
COMMUNITY

## 2022-12-30 RX ORDER — SODIUM,POTASSIUM PHOSPHATES 280-250MG
2 POWDER IN PACKET (EA) ORAL
Status: DISCONTINUED | OUTPATIENT
Start: 2022-12-31 | End: 2023-01-05 | Stop reason: HOSPADM

## 2022-12-30 RX ORDER — SODIUM CHLORIDE 9 MG/ML
INJECTION, SOLUTION INTRAVENOUS CONTINUOUS
Status: DISCONTINUED | OUTPATIENT
Start: 2022-12-31 | End: 2023-01-01

## 2022-12-30 RX ORDER — ONDANSETRON 4 MG/1
1 TABLET, ORALLY DISINTEGRATING ORAL EVERY 8 HOURS PRN
Status: ON HOLD | COMMUNITY
Start: 2022-12-29 | End: 2023-01-05 | Stop reason: HOSPADM

## 2022-12-30 RX ORDER — AZITHROMYCIN 250 MG/1
250 TABLET, FILM COATED ORAL
Status: ON HOLD | COMMUNITY
Start: 2022-12-28 | End: 2023-01-05 | Stop reason: HOSPADM

## 2022-12-30 RX ORDER — BUDESONIDE 0.5 MG/2ML
0.5 INHALANT ORAL ONCE
Status: COMPLETED | OUTPATIENT
Start: 2022-12-30 | End: 2022-12-30

## 2022-12-30 RX ORDER — VANCOMYCIN HCL IN 5 % DEXTROSE 1G/250ML
1000 PLASTIC BAG, INJECTION (ML) INTRAVENOUS
Status: COMPLETED | OUTPATIENT
Start: 2022-12-30 | End: 2022-12-31

## 2022-12-30 RX ORDER — ROSUVASTATIN CALCIUM 40 MG/1
40 TABLET, COATED ORAL DAILY
COMMUNITY
Start: 2022-11-23 | End: 2023-11-23

## 2022-12-30 RX ORDER — ATENOLOL 25 MG/1
25 TABLET ORAL 2 TIMES DAILY
COMMUNITY
Start: 2022-09-07

## 2022-12-30 RX ORDER — IBUPROFEN 200 MG
16 TABLET ORAL
Status: DISCONTINUED | OUTPATIENT
Start: 2022-12-31 | End: 2023-01-05 | Stop reason: HOSPADM

## 2022-12-30 RX ORDER — MAGNESIUM 250 MG
1 TABLET ORAL DAILY
COMMUNITY

## 2022-12-30 RX ORDER — METRONIDAZOLE 500 MG/1
500 TABLET ORAL 3 TIMES DAILY
Status: ON HOLD | COMMUNITY
Start: 2022-10-14 | End: 2023-01-05 | Stop reason: HOSPADM

## 2022-12-30 RX ORDER — IPRATROPIUM BROMIDE 0.5 MG/2.5ML
0.5 SOLUTION RESPIRATORY (INHALATION) ONCE
Status: COMPLETED | OUTPATIENT
Start: 2022-12-30 | End: 2022-12-30

## 2022-12-30 RX ORDER — BENZONATATE 100 MG/1
100 CAPSULE ORAL 3 TIMES DAILY PRN
Status: ON HOLD | COMMUNITY
Start: 2022-12-29 | End: 2023-01-05 | Stop reason: SDUPTHER

## 2022-12-30 RX ORDER — AMOXICILLIN 250 MG
1 CAPSULE ORAL 2 TIMES DAILY
Status: DISCONTINUED | OUTPATIENT
Start: 2022-12-30 | End: 2023-01-05 | Stop reason: HOSPADM

## 2022-12-30 RX ORDER — ACETAMINOPHEN 500 MG
1000 TABLET ORAL
Status: COMPLETED | OUTPATIENT
Start: 2022-12-30 | End: 2022-12-30

## 2022-12-30 RX ORDER — GUAIFENESIN 1200 MG/1
1200 TABLET, EXTENDED RELEASE ORAL 2 TIMES DAILY
COMMUNITY
Start: 2022-12-29 | End: 2023-01-08

## 2022-12-30 RX ADMIN — SODIUM CHLORIDE 1000 ML: 0.9 INJECTION, SOLUTION INTRAVENOUS at 10:12

## 2022-12-30 RX ADMIN — MAGNESIUM SULFATE HEPTAHYDRATE 1 G: 1 INJECTION, SOLUTION INTRAVENOUS at 09:12

## 2022-12-30 RX ADMIN — LEVALBUTEROL HYDROCHLORIDE 0.63 MG: 0.63 SOLUTION RESPIRATORY (INHALATION) at 07:12

## 2022-12-30 RX ADMIN — ACETAMINOPHEN 1000 MG: 500 TABLET ORAL at 07:12

## 2022-12-30 RX ADMIN — IOHEXOL 100 ML: 350 INJECTION, SOLUTION INTRAVENOUS at 08:12

## 2022-12-30 RX ADMIN — IPRATROPIUM BROMIDE 0.5 MG: 0.5 SOLUTION RESPIRATORY (INHALATION) at 07:12

## 2022-12-30 RX ADMIN — LEVALBUTEROL HYDROCHLORIDE 0.63 MG: 0.63 SOLUTION RESPIRATORY (INHALATION) at 09:12

## 2022-12-30 RX ADMIN — BUDESONIDE 0.5 MG: 0.5 INHALANT RESPIRATORY (INHALATION) at 08:12

## 2022-12-30 NOTE — LETTER
January 5, 2023         [address]  [facility] [address]  Phone: [phone]  Fax: [phone]       Patient: Doris Hoover   YOB: 1960  Date of Visit: 01/05/2023    To Whom It May Concern:    Maria Elena Hoover  was at [facility] on 01/13/2022. The patient may return to work/school with no restrictions. If you have any questions or concerns, or if I can be of further assistance, please do not hesitate to contact me.    Sincerely,    Ashley Sevilla RN   VO Dirk Amaro MD

## 2022-12-31 PROBLEM — Z99.81 HYPOXEMIA REQUIRING SUPPLEMENTAL OXYGEN: Status: ACTIVE | Noted: 2022-12-31

## 2022-12-31 PROBLEM — R09.02 HYPOXEMIA REQUIRING SUPPLEMENTAL OXYGEN: Status: ACTIVE | Noted: 2022-12-31

## 2022-12-31 LAB
ALBUMIN SERPL BCP-MCNC: 3 G/DL (ref 3.5–5.2)
ALP SERPL-CCNC: 55 U/L (ref 55–135)
ALT SERPL W/O P-5'-P-CCNC: 15 U/L (ref 10–44)
ANION GAP SERPL CALC-SCNC: 11 MMOL/L (ref 8–16)
AST SERPL-CCNC: 25 U/L (ref 10–40)
BILIRUB SERPL-MCNC: 0.9 MG/DL (ref 0.1–1)
BUN SERPL-MCNC: 9 MG/DL (ref 8–23)
CALCIUM SERPL-MCNC: 7.8 MG/DL (ref 8.7–10.5)
CHLORIDE SERPL-SCNC: 98 MMOL/L (ref 95–110)
CO2 SERPL-SCNC: 21 MMOL/L (ref 23–29)
CREAT SERPL-MCNC: 0.6 MG/DL (ref 0.5–1.4)
ERYTHROCYTE [DISTWIDTH] IN BLOOD BY AUTOMATED COUNT: 13.1 % (ref 11.5–14.5)
EST. GFR  (NO RACE VARIABLE): >60 ML/MIN/1.73 M^2
GLUCOSE SERPL-MCNC: 158 MG/DL (ref 70–110)
GLUCOSE SERPL-MCNC: 164 MG/DL (ref 70–110)
GLUCOSE SERPL-MCNC: 173 MG/DL (ref 70–110)
GLUCOSE SERPL-MCNC: 179 MG/DL (ref 70–110)
GLUCOSE SERPL-MCNC: 179 MG/DL (ref 70–110)
GLUCOSE SERPL-MCNC: 186 MG/DL (ref 70–110)
HCT VFR BLD AUTO: 33.4 % (ref 37–48.5)
HGB BLD-MCNC: 10.9 G/DL (ref 12–16)
LACTATE SERPL-SCNC: 1.5 MMOL/L (ref 0.5–1.9)
MCH RBC QN AUTO: 30.5 PG (ref 27–31)
MCHC RBC AUTO-ENTMCNC: 32.6 G/DL (ref 32–36)
MCV RBC AUTO: 94 FL (ref 82–98)
MRSA SCREEN BY PCR: NEGATIVE
PLATELET # BLD AUTO: 148 K/UL (ref 150–450)
PMV BLD AUTO: 11.8 FL (ref 9.2–12.9)
POTASSIUM SERPL-SCNC: 3.4 MMOL/L (ref 3.5–5.1)
PROT SERPL-MCNC: 5.7 G/DL (ref 6–8.4)
RBC # BLD AUTO: 3.57 M/UL (ref 4–5.4)
SODIUM SERPL-SCNC: 130 MMOL/L (ref 136–145)
WBC # BLD AUTO: 5.77 K/UL (ref 3.9–12.7)

## 2022-12-31 PROCEDURE — 25000003 PHARM REV CODE 250: Performed by: INTERNAL MEDICINE

## 2022-12-31 PROCEDURE — 87641 MR-STAPH DNA AMP PROBE: CPT | Performed by: INTERNAL MEDICINE

## 2022-12-31 PROCEDURE — 27000221 HC OXYGEN, UP TO 24 HOURS

## 2022-12-31 PROCEDURE — 25000242 PHARM REV CODE 250 ALT 637 W/ HCPCS: Performed by: INTERNAL MEDICINE

## 2022-12-31 PROCEDURE — 99223 PR INITIAL HOSPITAL CARE,LEVL III: ICD-10-PCS | Mod: ,,, | Performed by: INTERNAL MEDICINE

## 2022-12-31 PROCEDURE — 83605 ASSAY OF LACTIC ACID: CPT | Performed by: INTERNAL MEDICINE

## 2022-12-31 PROCEDURE — 94640 AIRWAY INHALATION TREATMENT: CPT

## 2022-12-31 PROCEDURE — 63600175 PHARM REV CODE 636 W HCPCS: Performed by: INTERNAL MEDICINE

## 2022-12-31 PROCEDURE — 99223 1ST HOSP IP/OBS HIGH 75: CPT | Mod: ,,, | Performed by: INTERNAL MEDICINE

## 2022-12-31 PROCEDURE — 80053 COMPREHEN METABOLIC PANEL: CPT | Performed by: INTERNAL MEDICINE

## 2022-12-31 PROCEDURE — 12000002 HC ACUTE/MED SURGE SEMI-PRIVATE ROOM

## 2022-12-31 PROCEDURE — 36415 COLL VENOUS BLD VENIPUNCTURE: CPT | Performed by: INTERNAL MEDICINE

## 2022-12-31 PROCEDURE — 85027 COMPLETE CBC AUTOMATED: CPT | Performed by: INTERNAL MEDICINE

## 2022-12-31 RX ORDER — IPRATROPIUM BROMIDE 0.5 MG/2.5ML
0.5 SOLUTION RESPIRATORY (INHALATION) EVERY 8 HOURS
Status: DISCONTINUED | OUTPATIENT
Start: 2022-12-31 | End: 2022-12-31

## 2022-12-31 RX ORDER — ALPRAZOLAM 0.5 MG/1
0.5 TABLET ORAL 3 TIMES DAILY PRN
Status: DISCONTINUED | OUTPATIENT
Start: 2022-12-31 | End: 2023-01-05 | Stop reason: HOSPADM

## 2022-12-31 RX ORDER — LIDOCAINE HYDROCHLORIDE 20 MG/ML
15 SOLUTION OROPHARYNGEAL ONCE
Status: COMPLETED | OUTPATIENT
Start: 2022-12-31 | End: 2022-12-31

## 2022-12-31 RX ORDER — IPRATROPIUM BROMIDE AND ALBUTEROL SULFATE 2.5; .5 MG/3ML; MG/3ML
3 SOLUTION RESPIRATORY (INHALATION) EVERY 4 HOURS
Status: DISCONTINUED | OUTPATIENT
Start: 2022-12-31 | End: 2023-01-01

## 2022-12-31 RX ORDER — ONDANSETRON 2 MG/ML
4 INJECTION INTRAMUSCULAR; INTRAVENOUS EVERY 4 HOURS PRN
Status: DISCONTINUED | OUTPATIENT
Start: 2022-12-31 | End: 2023-01-05 | Stop reason: HOSPADM

## 2022-12-31 RX ORDER — ONDANSETRON 2 MG/ML
4 INJECTION INTRAMUSCULAR; INTRAVENOUS EVERY 6 HOURS PRN
Status: DISCONTINUED | OUTPATIENT
Start: 2022-12-31 | End: 2022-12-31

## 2022-12-31 RX ORDER — GUAIFENESIN 600 MG/1
600 TABLET, EXTENDED RELEASE ORAL 2 TIMES DAILY
Status: DISCONTINUED | OUTPATIENT
Start: 2022-12-31 | End: 2023-01-05 | Stop reason: HOSPADM

## 2022-12-31 RX ORDER — IPRATROPIUM BROMIDE AND ALBUTEROL SULFATE 2.5; .5 MG/3ML; MG/3ML
3 SOLUTION RESPIRATORY (INHALATION) EVERY 4 HOURS PRN
Status: DISCONTINUED | OUTPATIENT
Start: 2022-12-31 | End: 2023-01-05 | Stop reason: HOSPADM

## 2022-12-31 RX ORDER — LEVOFLOXACIN 5 MG/ML
750 INJECTION, SOLUTION INTRAVENOUS
Status: DISCONTINUED | OUTPATIENT
Start: 2022-12-31 | End: 2022-12-31

## 2022-12-31 RX ORDER — LEVOFLOXACIN 5 MG/ML
750 INJECTION, SOLUTION INTRAVENOUS
Status: DISCONTINUED | OUTPATIENT
Start: 2023-01-01 | End: 2023-01-05 | Stop reason: HOSPADM

## 2022-12-31 RX ORDER — LEVALBUTEROL INHALATION SOLUTION 1.25 MG/3ML
1.25 SOLUTION RESPIRATORY (INHALATION)
Status: DISCONTINUED | OUTPATIENT
Start: 2022-12-31 | End: 2022-12-31

## 2022-12-31 RX ORDER — MAG HYDROX/ALUMINUM HYD/SIMETH 200-200-20
30 SUSPENSION, ORAL (FINAL DOSE FORM) ORAL ONCE
Status: COMPLETED | OUTPATIENT
Start: 2022-12-31 | End: 2022-12-31

## 2022-12-31 RX ORDER — ONDANSETRON 2 MG/ML
4 INJECTION INTRAMUSCULAR; INTRAVENOUS EVERY 4 HOURS PRN
Status: DISCONTINUED | OUTPATIENT
Start: 2022-12-31 | End: 2022-12-31

## 2022-12-31 RX ADMIN — PIPERACILLIN SODIUM AND TAZOBACTAM SODIUM 4.5 G: 4; .5 INJECTION, POWDER, LYOPHILIZED, FOR SOLUTION INTRAVENOUS at 08:12

## 2022-12-31 RX ADMIN — ONDANSETRON 4 MG: 2 INJECTION INTRAMUSCULAR; INTRAVENOUS at 03:12

## 2022-12-31 RX ADMIN — ACETAMINOPHEN 650 MG: 325 TABLET ORAL at 01:12

## 2022-12-31 RX ADMIN — INSULIN ASPART 2 UNITS: 100 INJECTION, SOLUTION INTRAVENOUS; SUBCUTANEOUS at 09:12

## 2022-12-31 RX ADMIN — PIPERACILLIN SODIUM AND TAZOBACTAM SODIUM 4.5 G: 4; .5 INJECTION, POWDER, LYOPHILIZED, FOR SOLUTION INTRAVENOUS at 04:12

## 2022-12-31 RX ADMIN — VANCOMYCIN HYDROCHLORIDE 1000 MG: 1 INJECTION, POWDER, LYOPHILIZED, FOR SOLUTION INTRAVENOUS at 12:12

## 2022-12-31 RX ADMIN — ACETAMINOPHEN 650 MG: 325 TABLET ORAL at 08:12

## 2022-12-31 RX ADMIN — GUAIFENESIN 600 MG: 600 TABLET, EXTENDED RELEASE ORAL at 09:12

## 2022-12-31 RX ADMIN — BENZONATATE 100 MG: 100 CAPSULE ORAL at 01:12

## 2022-12-31 RX ADMIN — ONDANSETRON 4 MG: 2 INJECTION INTRAMUSCULAR; INTRAVENOUS at 08:12

## 2022-12-31 RX ADMIN — LIDOCAINE HYDROCHLORIDE 15 ML: 20 SOLUTION ORAL at 02:12

## 2022-12-31 RX ADMIN — ALUMINUM HYDROXIDE, MAGNESIUM HYDROXIDE, AND SIMETHICONE 30 ML: 200; 200; 20 SUSPENSION ORAL at 02:12

## 2022-12-31 RX ADMIN — IPRATROPIUM BROMIDE AND ALBUTEROL SULFATE 3 ML: .5; 3 SOLUTION RESPIRATORY (INHALATION) at 09:12

## 2022-12-31 RX ADMIN — ENOXAPARIN SODIUM 40 MG: 100 INJECTION SUBCUTANEOUS at 06:12

## 2022-12-31 RX ADMIN — PANTOPRAZOLE SODIUM 40 MG: 40 TABLET, DELAYED RELEASE ORAL at 06:12

## 2022-12-31 RX ADMIN — ACETAMINOPHEN 650 MG: 325 TABLET ORAL at 03:12

## 2022-12-31 RX ADMIN — SODIUM CHLORIDE: 0.9 INJECTION, SOLUTION INTRAVENOUS at 01:12

## 2022-12-31 RX ADMIN — IPRATROPIUM BROMIDE AND ALBUTEROL SULFATE 3 ML: .5; 3 SOLUTION RESPIRATORY (INHALATION) at 12:12

## 2022-12-31 RX ADMIN — IBUPROFEN 600 MG: 200 TABLET, FILM COATED ORAL at 07:12

## 2022-12-31 RX ADMIN — ONDANSETRON 4 MG: 2 INJECTION INTRAMUSCULAR; INTRAVENOUS at 07:12

## 2022-12-31 RX ADMIN — IPRATROPIUM BROMIDE AND ALBUTEROL SULFATE 3 ML: .5; 3 SOLUTION RESPIRATORY (INHALATION) at 03:12

## 2022-12-31 RX ADMIN — SENNOSIDES AND DOCUSATE SODIUM 1 TABLET: 50; 8.6 TABLET ORAL at 08:12

## 2022-12-31 RX ADMIN — IPRATROPIUM BROMIDE AND ALBUTEROL SULFATE 3 ML: .5; 3 SOLUTION RESPIRATORY (INHALATION) at 08:12

## 2022-12-31 RX ADMIN — INSULIN ASPART 2 UNITS: 100 INJECTION, SOLUTION INTRAVENOUS; SUBCUTANEOUS at 05:12

## 2022-12-31 NOTE — ED PROVIDER NOTES
Encounter Date: 2022       History     Chief Complaint   Patient presents with    Cough     Pt presents to ER with c/o cough and fever x 2 days. Pt tested negative for covid and flu at urgent care.      62-year-old female presents complaining of coughing fever body aches chills and shortness of breath.  Symptoms have been present over the past 4 days.  Temperature has reached 102.  Last Tylenol was approximately 5 hours ago.  Presented to the ER today as her symptoms have not improved.  She was seen at urgent care at the onset of the symptoms, was prescribed Zithromax but she discontinued the medicine as she stated that she developed some diarrhea after taking the medicine.  She was noted to have a low oxygen saturation at triage.  She however denies feeling short of breath.  She does not smoke or have any known respiratory problems.  She states influenza and COVID testing during her previous urgent care evaluation were negative.  The patient denies chest pain.  She does report frequent coughing.  She is had nasal congestion.  She denies any sore throat.  She denies any headache, neck pain or stiffness.  She denies any abdominal pain.  She denies vomiting but has had nausea.  She denies any other problems or complaints.  She denies lightheadedness syncope or near-syncope.      Review of patient's allergies indicates:   Allergen Reactions    Butorphanol tartrate Other (See Comments)     Hallucinations  hallucinations      Methylprednisolone Other (See Comments) and Shortness Of Breath     flushing  flushing       Past Medical History:   Diagnosis Date    Allergy     Diabetes mellitus, type 2     Hypertension      Past Surgical History:   Procedure Laterality Date     SECTION      3    HYSTERECTOMY      TONSILLECTOMY       No family history on file.  Social History     Tobacco Use    Smoking status: Never    Smokeless tobacco: Never   Substance Use Topics    Alcohol use: Not Currently     Review of  Systems   Constitutional:  Positive for activity change, appetite change, chills and fatigue. Negative for fever.   HENT:  Positive for congestion and rhinorrhea. Negative for dental problem, ear pain, sinus pressure, sinus pain, sore throat and trouble swallowing.    Eyes: Negative.  Negative for photophobia, pain, redness and visual disturbance.   Respiratory:  Positive for cough. Negative for chest tightness, shortness of breath and wheezing.    Cardiovascular: Negative.  Negative for chest pain, palpitations and leg swelling.   Gastrointestinal:  Positive for diarrhea and nausea. Negative for abdominal distention, abdominal pain, anal bleeding, blood in stool, constipation and vomiting.   Endocrine: Negative.    Genitourinary: Negative.  Negative for decreased urine volume, difficulty urinating, dysuria, flank pain, frequency, hematuria, pelvic pain and urgency.   Musculoskeletal:  Positive for myalgias. Negative for arthralgias, back pain, gait problem, joint swelling, neck pain and neck stiffness.   Skin: Negative.  Negative for color change, pallor and rash.   Neurological: Negative.  Negative for dizziness, tremors, seizures, syncope, facial asymmetry, speech difficulty, weakness, light-headedness, numbness and headaches.   Hematological: Negative.  Does not bruise/bleed easily.   Psychiatric/Behavioral: Negative.  Negative for confusion.    All other systems reviewed and are negative.    Physical Exam     Initial Vitals [12/30/22 1900]   BP Pulse Resp Temp SpO2   136/82 82 20 100.2 °F (37.9 °C) (!) 82 %      MAP       --         Physical Exam    Nursing note and vitals reviewed.  Constitutional: She is not diaphoretic. She is active and cooperative.  Non-toxic appearance. She does not have a sickly appearance. She does not appear ill. No distress.   HENT:   Head: Normocephalic and atraumatic.   Right Ear: Tympanic membrane normal.   Left Ear: Tympanic membrane normal.   Nose: Nose normal.   Mouth/Throat:  Uvula is midline, oropharynx is clear and moist and mucous membranes are normal. No oral lesions. No uvula swelling. No oropharyngeal exudate, posterior oropharyngeal edema or posterior oropharyngeal erythema.   Eyes: Conjunctivae, EOM and lids are normal. Pupils are equal, round, and reactive to light. No scleral icterus.   Neck: Trachea normal and phonation normal. Neck supple. No thyroid mass and no thyromegaly present. No stridor present. No JVD present.   Normal range of motion.   Full passive range of motion without pain.     Cardiovascular:  Normal rate, regular rhythm, normal heart sounds, intact distal pulses and normal pulses.     Exam reveals no gallop, no distant heart sounds and no friction rub.       No murmur heard.  Pulmonary/Chest: Effort normal. No accessory muscle usage or stridor. No tachypnea. No respiratory distress. She has decreased breath sounds. She has wheezes. She has rhonchi. She has no rales.   Pulse ox 82% on room air, placed on supplemental oxygen with improvement to 91%.   Abdominal: Abdomen is soft. Bowel sounds are normal. She exhibits no distension, no pulsatile midline mass and no mass. There is no abdominal tenderness. There is no rigidity and no guarding.   Musculoskeletal:         General: No tenderness or edema. Normal range of motion.      Right hand: Normal. Normal range of motion. Normal strength. Normal sensation. Normal capillary refill. Normal pulse.      Left hand: Normal. Normal range of motion. Normal strength. Normal sensation. Normal capillary refill. Normal pulse.      Cervical back: Normal, full passive range of motion without pain, normal range of motion and neck supple. No edema, erythema, rigidity or bony tenderness. No spinous process tenderness or muscular tenderness. Normal range of motion.      Thoracic back: Normal. No bony tenderness. Normal range of motion.      Lumbar back: Normal. No bony tenderness. Normal range of motion.      Right foot: Normal.  Normal range of motion and normal capillary refill. No tenderness or bony tenderness. Normal pulse.      Left foot: Normal. Normal range of motion and normal capillary refill. No tenderness or bony tenderness. Normal pulse.      Comments: Pulses are 2+ throughout, cap refill is less than 2 sec throughout, extremities are nontender throughout with full range of motion. There is no spinal tenderness to palpation.     Neurological: She is alert and oriented to person, place, and time. She has normal strength. She displays normal reflexes. No cranial nerve deficit or sensory deficit. Gait normal.   No focal deficits.   Skin: Skin is warm, dry and intact. Capillary refill takes less than 2 seconds. No ecchymosis, no petechiae and no rash noted. No erythema. No pallor.   Psychiatric: She has a normal mood and affect. Her speech is normal and behavior is normal. Judgment and thought content normal. Cognition and memory are normal.       ED Course   Procedures  Labs Reviewed   CBC W/ AUTO DIFFERENTIAL - Abnormal; Notable for the following components:       Result Value    RBC 3.87 (*)     Hemoglobin 11.7 (*)     Hematocrit 35.9 (*)     Lymph # 0.8 (*)     Gran % 80.1 (*)     Lymph % 11.3 (*)     All other components within normal limits   COMPREHENSIVE METABOLIC PANEL - Abnormal; Notable for the following components:    Sodium 132 (*)     Potassium 3.4 (*)     Glucose 139 (*)     Calcium 8.4 (*)     All other components within normal limits   TROPONIN I HIGH SENSITIVITY - Abnormal; Notable for the following components:    Troponin I High Sensitivity 43.4 (*)     All other components within normal limits   B-TYPE NATRIURETIC PEPTIDE - Abnormal; Notable for the following components:     (*)     All other components within normal limits   CBC W/ AUTO DIFFERENTIAL - Abnormal; Notable for the following components:    RBC 3.87 (*)     Hemoglobin 11.7 (*)     Hematocrit 35.9 (*)     Lymph # 0.8 (*)     Gran % 80.1 (*)      Lymph % 11.3 (*)     All other components within normal limits   COMPREHENSIVE METABOLIC PANEL - Abnormal; Notable for the following components:    Sodium 132 (*)     Potassium 3.4 (*)     Glucose 139 (*)     Calcium 8.4 (*)     All other components within normal limits   ISTAT CREATININE - Abnormal; Notable for the following components:    POC Creatinine 0.4 (*)     All other components within normal limits   CULTURE, BLOOD   CULTURE, BLOOD   LIPASE   CK   MAGNESIUM   TSH   LACTIC ACID, PLASMA   INFLUENZA A AND B ANTIGEN    Narrative:     Specimen Source->Nasopharyngeal Swab   PHOSPHORUS   PROCALCITONIN   SARS-COV-2 RNA AMPLIFICATION, QUAL   TROPONIN I HIGH SENSITIVITY   DRUG SCREEN PANEL, URINE EMERGENCY   URINALYSIS, REFLEX TO URINE CULTURE   POCT CREATININE          Imaging Results              CTA Chest Non-Coronary (PE Studies) (Final result)  Result time 12/30/22 21:16:25      Final result by Ramakrishna Paulson MD (12/30/22 21:16:25)                   Narrative:    INDICATION: Pulmonary embolism (PE) suspected, high prob    EXAMINATION: CT ANGIOGRAM CHEST    TECHNIQUE:  The examination was performed with the intravenous administration of 100 mL of Omnipaque 350.  Post-processing of the angiographic images was performed, with multiplanar reformation and 3D reconstruction.    Individualized dose optimization techniques were used for this CT.    COMPARISON: CT angiogram of the chest August 15, 2021    FINDINGS:    PULMONARY ARTERIES: Normal in caliber. No pulmonary embolism.    AORTA AND GREAT VESSELS: Normal in caliber. No evidence of dissection.    HEART AND PERICARDIUM: Heart is prominent. The right and left atrium appears dilated. There is no pericardial effusion    LUNGS, PLEURA: There are extensive right-sided airspace disease consistent with multifocal pneumonia. Left lung is relatively clear with minimal airspace disease at the left base. There is a small right pleural effusion. No pneumothorax.    SOFT  TISSUES: No axillary adenopathy.    MEDIASTINUM AND AVINASH: No mediastinal or hilar adenopathy. Esophagus is unremarkable. No hiatal hernia.    UPPER ABDOMEN: No acute pathology.    BONES: There is no evidence of fracture or focal bone lesion.    IMPRESSION:    1.  No evidence of pulmonary embolus.  2.  Extensive right-sided airspace disease consistent with pneumonia.    Electronically signed by:  Ramakrishna Paulson MD  12/30/2022 9:16 PM CST Workstation: [id number]                                     X-Ray Chest AP Portable (Final result)  Result time 12/30/22 19:56:10      Final result by Faby Bazan MD (12/30/22 19:56:10)                   Narrative:    Portable chest x-ray at 7:29 PM    Clinical history is fever    The cardiomediastinal silhouette is normal in size. There is a masslike alveolar opacity in the right lower lobe suggestive of pneumonia. The right upper lobe and left lung are clear. There are no acute osseous abnormality is.    IMPRESSION: Masslike alveolar opacity in the right lower lobe suggestive of pneumonia. Follow-up chest x-ray in 7-10 days is recommended to document clearing    Mild cardiomegaly    Electronically signed by:  Faby Bazan MD  12/30/2022 7:56 PM CST Workstation: [id number]                                     Medications   magnesium sulfate in dextrose IVPB (premix) 1 g (1 g Intravenous New Bag 12/30/22 2145)   meropenem-0.9% sodium chloride 1 g/50 mL IVPB (has no administration in time range)   vancomycin - pharmacy to dose (has no administration in time range)   vancomycin in dextrose 5 % 1 gram/250 mL IVPB 1,000 mg (has no administration in time range)     Followed by   vancomycin 500 mg in dextrose 5 % 100 mL IVPB (ready to mix system) (has no administration in time range)   sodium chloride 0.9% bolus 1,000 mL 1,000 mL (has no administration in time range)   levalbuterol nebulizer solution 0.63 mg (0.63 mg Nebulization Given 12/30/22 1958)   ipratropium 0.02 % nebulizer  solution 0.5 mg (0.5 mg Nebulization Given 12/30/22 1958)   budesonide nebulizer solution 0.5 mg (0.5 mg Nebulization Given 12/30/22 2000)   acetaminophen tablet 1,000 mg (1,000 mg Oral Given 12/30/22 1952)   iohexoL (OMNIPAQUE 350) injection 100 mL (100 mLs Intravenous Given 12/30/22 2035)   levalbuterol nebulizer solution 0.63 mg (0.63 mg Nebulization Given 12/30/22 2121)     Medical Decision Making:   Clinical Tests:   Lab Tests: Reviewed  Radiological Study: Reviewed  Medical Tests: Reviewed  ED Management:  Lactic acid is not elevated, pulse ox has improved to 90% with supplemental oxygen.  She is not had any noted respiratory distress.  CTA of the chest demonstrates pneumonia.  No pulmonary embolism noted, the patient is not currently demonstrating any evidence of shock or hypoperfusion.  Blood cultures have been obtained.  IV antibiotics have been given.  Will discuss with hospitalist for admission.                        Clinical Impression:   Final diagnoses:  [R50.9] Fever  [J18.9] Pneumonia due to infectious organism, unspecified laterality, unspecified part of lung (Primary)  [R09.02, Z99.81] Hypoxemia requiring supplemental oxygen        ED Disposition Condition    Admit Stable                Jada Brush MD  12/30/22 [id number]

## 2022-12-31 NOTE — HPI
62 year old patient getting admitted with a cute pneumonia and associated respiratory failure  Pts Daughter was sick and later pt started having URTI like symptoms  Pt has fever for past few days associated with Cough. Denied SOB  She went to Urgent care and was prescribed abx  Symptoms didn't went away and she came to this hospital and got admitted   In urgent care she was tested negative for COVID/flu

## 2022-12-31 NOTE — ED NOTES
Pt AAOx3 and in NAD. Pt O2 sat at 91% on 4L NC. Pt has frequent cough. Pt says she feels better than when she first came in. Call light in reach. Family at bedside. Pt denies needs.

## 2022-12-31 NOTE — ED NOTES
Pt AAOx3 and in NAD. Pt says she is not SOB. Pt O2 sat at 89% on 4L NC. Pt denies needs at this time. Call light in reach. Family at bedside

## 2022-12-31 NOTE — H&P
[facility] - Emergency Dept  Hospital Medicine  History & Physical    Patient Name: Doris Hoover  MRN: [id number]  Patient Class: IP- Inpatient  Admission Date: 2022  Attending Physician: Travis Brenner MD   Primary Care Provider: Arnold Buchanan NP         Patient information was obtained from patient and ER records.     Subjective:     Principal Problem:Pneumonia due to infectious organism    Chief Complaint:   Chief Complaint   Patient presents with    Cough     Pt presents to ER with c/o cough and fever x 2 days. Pt tested negative for covid and flu at urgent care.         HPI: 62 year old patient getting admitted with a cute pneumonia and associated respiratory failure  Pts Daughter was sick and later pt started having URTI like symptoms  Pt has fever for past few days associated with Cough. Denied SOB  She went to Urgent care and was prescribed abx  Symptoms didn't went away and she came to this hospital and got admitted   In urgent care she was tested negative for COVID/flu      Past Medical History:   Diagnosis Date    Allergy     Diabetes mellitus, type 2     Hypertension        Past Surgical History:   Procedure Laterality Date     SECTION      3    HYSTERECTOMY      TONSILLECTOMY         Review of patient's allergies indicates:   Allergen Reactions    Butorphanol tartrate Other (See Comments)     Hallucinations  hallucinations      Methylprednisolone Other (See Comments) and Shortness Of Breath     flushing  flushing         No current facility-administered medications on file prior to encounter.     Current Outpatient Medications on File Prior to Encounter   Medication Sig    acetaminophen (TYLENOL) 650 MG TbSR Take 650 mg by mouth every 8 (eight) hours.    amLODIPine (NORVASC) 10 MG tablet Take 10 mg by mouth once daily.     atenoloL (TENORMIN) 25 MG tablet Take 25 mg by mouth 2 (two) times daily.    b complex vitamins capsule Take 1 capsule by mouth once daily.     benzonatate (TESSALON) 100 MG capsule Take 100 mg by mouth 3 times daily as needed.    cholecalciferol, vitamin D3, (VITAMIN D3) 25 mcg (1,000 unit) capsule Take 1,000 Units by mouth once daily.    glipiZIDE (GLUCOTROL) 5 MG tablet Take 5 mg by mouth once daily.    guaiFENesin (MUCINEX) 1,200 mg Ta12 Take 1,200 mg by mouth 2 (two) times a day.    lisinopriL (PRINIVIL,ZESTRIL) 30 MG tablet Take 30 mg by mouth once daily.     magnesium 250 mg Tab Take 1 tablet by mouth once daily.    multivitamin (THERAGRAN) per tablet Take 1 tablet by mouth once daily.    omeprazole (PRILOSEC) 40 MG capsule Take 40 mg by mouth once daily.    ondansetron (ZOFRAN-ODT) 4 MG TbDL Take 1 tablet by mouth every 8 (eight) hours as needed.    rosuvastatin (CRESTOR) 40 MG Tab Take 40 mg by mouth once daily.    atenolol (TENORMIN) 100 MG tablet Take 100 mg by mouth once daily.    azithromycin (Z-LISSET) 250 MG tablet Take 250 mg by mouth.    ciprofloxacin HCl (CIPRO) 500 MG tablet Take 1 tablet (500 mg total) by mouth every 12 (twelve) hours.    glimepiride (AMARYL) 4 MG tablet Take 4 mg by mouth.    glipiZIDE (GLUCOTROL) 5 MG tablet Take 5 mg by mouth 2 (two) times daily before meals.    liraglutide 0.6 mg/0.1 mL, 18 mg/3 mL, subq PNIJ (VICTOZA 2-LISSET) 0.6 mg/0.1 mL (18 mg/3 mL) PnIj pen Inject 1.2 mg into the skin.    metroNIDAZOLE (FLAGYL) 500 MG tablet Take 500 mg by mouth 3 (three) times daily.    pantoprazole (PROTONIX) 40 MG tablet Take 40 mg by mouth once daily.    promethazine-dextromethorphan (PROMETHAZINE-DM) 6.25-15 mg/5 mL Syrp Take 5-10 mLs by mouth every 6 (six) hours as needed.    semaglutide (OZEMPIC) 1 mg/dose (2 mg/1.5 mL) PnIj Inject 1 mg into the skin every 7 days.     tiZANidine (ZANAFLEX) 4 MG tablet Take 4 mg by mouth every 8 (eight) hours.      Family History       Problem Relation (Age of Onset)    Hypertension Mother          Tobacco Use    Smoking status: Never    Smokeless tobacco: Never    Substance and Sexual Activity    Alcohol use: Not Currently    Drug use: Not on file    Sexual activity: Not on file     Review of Systems   Constitutional:  Positive for fatigue and fever. Negative for activity change and appetite change.   HENT:  Negative for congestion and dental problem.    Eyes:  Negative for discharge and itching.   Respiratory:  Positive for cough. Negative for shortness of breath.    Cardiovascular:  Negative for chest pain.   Gastrointestinal:  Negative for abdominal distention and abdominal pain.   Endocrine: Negative for cold intolerance.   Genitourinary:  Negative for difficulty urinating and dysuria.   Musculoskeletal:  Negative for arthralgias and back pain.   Skin:  Negative for color change.   Neurological:  Negative for dizziness and facial asymmetry.   Hematological:  Negative for adenopathy.   Psychiatric/Behavioral:  Negative for agitation and behavioral problems.    Objective:     Vital Signs (Most Recent):  Temp: 99.3 °F (37.4 °C) (12/30/22 2148)  Pulse: 75 (12/30/22 2314)  Resp: (!) 22 (12/30/22 2314)  BP: (!) 115/57 (12/30/22 2314)  SpO2: (!) 91 % (12/30/22 2314)   Vital Signs (24h Range):  Temp:  [99.3 °F (37.4 °C)-100.2 °F (37.9 °C)] 99.3 °F (37.4 °C)  Pulse:  [75-88] 75  Resp:  [16-28] 22  SpO2:  [82 %-91 %] 91 %  BP: ()/(50-82) 115/57     Weight: 73.9 kg (163 lb)  Body mass index is 26.31 kg/m².    Physical Exam  Vitals and nursing note reviewed.   Constitutional:       General: She is not in acute distress.  HENT:      Head: Atraumatic.      Right Ear: External ear normal.      Left Ear: External ear normal.      Nose: Nose normal.      Mouth/Throat:      Mouth: Mucous membranes are moist.   Cardiovascular:      Rate and Rhythm: Normal rate.   Pulmonary:      Effort: Pulmonary effort is normal.      Breath sounds: Rales present.   Musculoskeletal:         General: Normal range of motion.      Cervical back: Normal range of motion.   Skin:     General: Skin is  warm.   Neurological:      Mental Status: She is alert and oriented to person, place, and time.   Psychiatric:         Behavior: Behavior normal.           Significant Labs: All pertinent labs within the past 24 hours have been reviewed.  CBC:   Recent Labs   Lab 12/30/22 1924   WBC 7.42  7.42   HGB 11.7*  11.7*   HCT 35.9*  35.9*     177     CMP:   Recent Labs   Lab 12/30/22 1924   *  132*   K 3.4*  3.4*   CL 97  97   CO2 26  26   *  139*   BUN 15  15   CREATININE 0.8  0.8   CALCIUM 8.4*  8.4*   PROT 7.0  7.0   ALBUMIN 3.6  3.6   BILITOT 1.0  1.0   ALKPHOS 65  65   AST 27  27   ALT 16  16   ANIONGAP 9  9       Significant Imaging: I have reviewed all pertinent imaging results/findings within the past 24 hours.    Assessment/Plan:     * Pneumonia due to infectious organism  Extensive R sided pneumonia  Start iv levofloxacin / iv zosyn   Other supportive measures including O2      Acute hypoxemic respiratory failure  As above     Type 2 diabetes mellitus, without long-term current use of insulin  Maintain SSI    Essential hypertension  Hold BP meds in view with relative hypotension      VTE Risk Mitigation (From admission, onward)         Ordered     enoxaparin injection 40 mg  Every 24 hours (non-standard times)         12/30/22 2321     IP VTE LOW RISK PATIENT  Once         12/30/22 2318     Place sequential compression device  Until discontinued         12/30/22 2318                   Travis Brenner MD  Department of Hospital Medicine   [facility] - Emergency Dept

## 2022-12-31 NOTE — CONSULTS
Pulmonary/Critical Care Consult      Patient name: Doris Hoover  MRN: [id number]  Date: 2022    Admit Date: 2022  Consult Requested By: Dirk Amaro MD    Reason for Consult: Pneumonia    HPI:    2022 - 61 yo sick for 4-5 days - went to Urgent Care and treated with po zithromax didn't get better and came to ER.  She has tested negative for Covid and flu.  CTA negative for clots but has extensive right pneumonia.  ROS as below.  Has required 4-7 LPM.  Did have a family member who was sick.  Denies HIV risk factors    Review of Systems    Review of Systems   Constitutional:  Positive for fever and malaise/fatigue. Negative for chills, diaphoresis and weight loss.   HENT:  Negative for congestion.    Eyes:  Negative for pain.   Respiratory:  Positive for cough, sputum production (minimal) and shortness of breath. Negative for hemoptysis, wheezing and stridor.    Cardiovascular:  Positive for chest pain (with cough). Negative for palpitations, orthopnea, claudication, leg swelling and PND.   Gastrointestinal:  Positive for nausea. Negative for abdominal pain, blood in stool, constipation, diarrhea, heartburn and vomiting.   Genitourinary:  Negative for dysuria, frequency and urgency.   Musculoskeletal:  Positive for myalgias (minimal). Negative for falls.   Neurological:  Positive for weakness. Negative for sensory change and focal weakness.   Psychiatric/Behavioral:  Negative for depression, substance abuse and suicidal ideas. The patient is not nervous/anxious.      Past Medical History    Past Medical History:   Diagnosis Date    Allergy     Diabetes mellitus, type 2     Hypertension        Past Surgical History    Past Surgical History:   Procedure Laterality Date     SECTION      3    HYSTERECTOMY      TONSILLECTOMY         Medications (scheduled):      albuterol-ipratropium  3 mL Nebulization Q4H    enoxparin  40 mg Subcutaneous Q24H    [START ON 2023] levoFLOXacin  750 mg  "Intravenous Q24H    pantoprazole  40 mg Oral Daily    piperacillin-tazobactam (ZOSYN) IVPB  4.5 g Intravenous Q8H    senna-docusate 8.6-50 mg  1 tablet Oral BID    vancomycin (VANCOCIN) IVPB  500 mg Intravenous ED 1 Time       Medications (infusions):      sodium chloride 0.9% 150 mL/hr at 12/31/22 0122       Medications (prn):     acetaminophen, albuterol-ipratropium, ALPRAZolam, benzonatate, dextrose 10%, dextrose 10%, glucagon (human recombinant), glucose, glucose, insulin aspart U-100, magnesium oxide, magnesium oxide, ondansetron, potassium bicarbonate, potassium bicarbonate, potassium bicarbonate, potassium, sodium phosphates, potassium, sodium phosphates, potassium, sodium phosphates    Family History:   Family History   Problem Relation Age of Onset    Hypertension Mother        Social History: Tobacco:   Social History     Tobacco Use   Smoking Status Never   Smokeless Tobacco Never                                EtOH:   Social History     Substance and Sexual Activity   Alcohol Use Not Currently                                Drugs:   Social History     Substance and Sexual Activity   Drug Use Not on file         Physical Exam    Vital signs:  Temp:  [98.6 °F (37 °C)-100.2 °F (37.9 °C)]   Pulse:  [73-88]   Resp:  [16-28]   BP: ()/(50-82)   SpO2:  [82 %-93 %]     Intake/Output:   Intake/Output Summary (Last 24 hours) at 12/31/2022 0835  Last data filed at 12/30/2022 2350  Gross per 24 hour   Intake 100 ml   Output --   Net 100 ml        BMI: Estimated body mass index is 26.3 kg/m² as calculated from the following:    Height as of this encounter: 5' 6" (1.676 m).    Weight as of this encounter: 73.9 kg (162 lb 14.7 oz).    Physical Exam  Vitals and nursing note reviewed.   Constitutional:       General: She is not in acute distress.     Appearance: Normal appearance. She is ill-appearing. She is not toxic-appearing or diaphoretic.   HENT:      Head: Normocephalic and atraumatic.      Right Ear: " External ear normal.      Left Ear: External ear normal.      Nose: Nose normal. No congestion or rhinorrhea.      Mouth/Throat:      Mouth: Mucous membranes are moist.      Pharynx: Oropharynx is clear. No oropharyngeal exudate or posterior oropharyngeal erythema.   Eyes:      General: No scleral icterus.        Right eye: No discharge.         Left eye: No discharge.      Extraocular Movements: Extraocular movements intact.      Conjunctiva/sclera: Conjunctivae normal.      Pupils: Pupils are equal, round, and reactive to light.   Neck:      Vascular: No carotid bruit.   Cardiovascular:      Rate and Rhythm: Normal rate and regular rhythm.      Pulses: Normal pulses.      Heart sounds: No murmur heard.    No friction rub. No gallop.   Pulmonary:      Effort: Pulmonary effort is normal. No respiratory distress.      Breath sounds: No stridor. Rhonchi (right side) present. No wheezing or rales.      Comments: No acc m use  Chest:      Chest wall: No tenderness.   Abdominal:      General: Bowel sounds are normal. There is no distension.      Tenderness: There is no abdominal tenderness. There is no guarding.   Musculoskeletal:         General: No swelling. Normal range of motion.      Cervical back: Normal range of motion and neck supple. No rigidity or tenderness.      Right lower leg: No edema.      Left lower leg: No edema.   Lymphadenopathy:      Cervical: No cervical adenopathy.   Skin:     General: Skin is warm and dry.      Capillary Refill: Capillary refill takes less than 2 seconds.      Coloration: Skin is not jaundiced.      Findings: No bruising.   Neurological:      General: No focal deficit present.      Mental Status: She is alert and oriented to person, place, and time. Mental status is at baseline.      Cranial Nerves: No cranial nerve deficit.      Sensory: No sensory deficit.      Motor: No weakness.   Psychiatric:         Mood and Affect: Mood normal.         Behavior: Behavior normal.          Thought Content: Thought content normal.         Judgment: Judgment normal.       Laboratory    Recent Labs   Lab 12/31/22  0711   WBC 5.77   RBC 3.57*   HGB 10.9*   HCT 33.4*   *   MCV 94   MCH 30.5   MCHC 32.6       Recent Labs   Lab 12/30/22 1924   CALCIUM 8.4*  8.4*   PROT 7.0  7.0   *  132*   K 3.4*  3.4*   CO2 26  26   CL 97  97   BUN 15  15   CREATININE 0.8  0.8   ALKPHOS 65  65   ALT 16  16   AST 27  27   BILITOT 1.0  1.0       No results for input(s): PT, INR, APTT in the last 24 hours.    Recent Labs   Lab 12/30/22 1924   CPK 84       Additional labs: reviewed    Microbiology:       Microbiology Results (last 7 days)       Procedure Component Value Units Date/Time    Blood culture x two cultures. Draw prior to antibiotics. [[id number]] Collected: 12/30/22 1940    Order Status: Completed Specimen: Blood from Peripheral, Antecubital, Right Updated: 12/31/22 0158     Blood Culture, Routine No Growth to date    Narrative:      Aerobic and anaerobic    Blood culture x two cultures. Draw prior to antibiotics. [[id number]] Collected: 12/30/22 1924    Order Status: Completed Specimen: Blood from Peripheral, Antecubital, Left Updated: 12/31/22 0158     Blood Culture, Routine No Growth to date    Narrative:      Aerobic and anaerobic    Urine culture [[id number]] Collected: 12/30/22 2140    Order Status: No result Specimen: Urine Updated: 12/30/22 2214            Radiology    CTA Chest Non-Coronary (PE Studies)  INDICATION: Pulmonary embolism (PE) suspected, high prob    EXAMINATION: CT ANGIOGRAM CHEST    TECHNIQUE:  The examination was performed with the intravenous administration of 100 mL of Omnipaque 350.  Post-processing of the angiographic images was performed, with multiplanar reformation and 3D reconstruction.    Individualized dose optimization techniques were used for this CT.    COMPARISON: CT angiogram of the chest August 15, 2021    FINDINGS:    PULMONARY ARTERIES: Normal in  caliber. No pulmonary embolism.    AORTA AND GREAT VESSELS: Normal in caliber. No evidence of dissection.    HEART AND PERICARDIUM: Heart is prominent. The right and left atrium appears dilated. There is no pericardial effusion    LUNGS, PLEURA: There are extensive right-sided airspace disease consistent with multifocal pneumonia. Left lung is relatively clear with minimal airspace disease at the left base. There is a small right pleural effusion. No pneumothorax.    SOFT TISSUES: No axillary adenopathy.    MEDIASTINUM AND AVINASH: No mediastinal or hilar adenopathy. Esophagus is unremarkable. No hiatal hernia.    UPPER ABDOMEN: No acute pathology.    BONES: There is no evidence of fracture or focal bone lesion.    IMPRESSION:    1.  No evidence of pulmonary embolus.  2.  Extensive right-sided airspace disease consistent with pneumonia.    Electronically signed by:  Ramakrishna Paulson MD  12/30/2022 9:16 PM CST Workstation: [id number]  X-Ray Chest AP Portable  Portable chest x-ray at 7:29 PM    Clinical history is fever    The cardiomediastinal silhouette is normal in size. There is a masslike alveolar opacity in the right lower lobe suggestive of pneumonia. The right upper lobe and left lung are clear. There are no acute osseous abnormality is.    IMPRESSION: Masslike alveolar opacity in the right lower lobe suggestive of pneumonia. Follow-up chest x-ray in 7-10 days is recommended to document clearing    Mild cardiomegaly    Electronically signed by:  Faby Bazan MD  12/30/2022 7:56 PM CST Workstation: [id number]        Additional Studies    reviewed    Ventilator Information              No results for input(s): PH, PCO2, PO2, HCO3, POCSATURATED, BE in the last 72 hours.      Impression    Active Hospital Problems    Diagnosis  POA    *Pneumonia due to infectious organism [J18.9]  Unknown    Acute hypoxemic respiratory failure [J96.01]  Unknown    Essential hypertension [I10]  Yes    Type 2 diabetes mellitus, without  "long-term current use of insulin [E11.9]  Yes      Resolved Hospital Problems   No resolved problems to display.       Plan    Continue antibiotics  Prn respiratory treatments' check MRSA screen and if negative will stop vancomycin  Can probably further narrow antibiotics in the next day or so  Consider steroids but she reports an "allergy"  Recheck CXR today with increase in O2 needs  Prophylactic lovenox  OOB as able    Thank you for this consult.  I will follow with you while the patient is hospitalized.        Pedro Viera MD  [facility] Pulmonary/Critical Care  12/31/2022          "

## 2022-12-31 NOTE — SUBJECTIVE & OBJECTIVE
Past Medical History:   Diagnosis Date    Allergy     Diabetes mellitus, type 2     Hypertension        Past Surgical History:   Procedure Laterality Date     SECTION      3    HYSTERECTOMY      TONSILLECTOMY         Review of patient's allergies indicates:   Allergen Reactions    Butorphanol tartrate Other (See Comments)     Hallucinations  hallucinations      Methylprednisolone Other (See Comments) and Shortness Of Breath     flushing  flushing         No current facility-administered medications on file prior to encounter.     Current Outpatient Medications on File Prior to Encounter   Medication Sig    acetaminophen (TYLENOL) 650 MG TbSR Take 650 mg by mouth every 8 (eight) hours.    amLODIPine (NORVASC) 10 MG tablet Take 10 mg by mouth once daily.     atenoloL (TENORMIN) 25 MG tablet Take 25 mg by mouth 2 (two) times daily.    b complex vitamins capsule Take 1 capsule by mouth once daily.    benzonatate (TESSALON) 100 MG capsule Take 100 mg by mouth 3 times daily as needed.    cholecalciferol, vitamin D3, (VITAMIN D3) 25 mcg (1,000 unit) capsule Take 1,000 Units by mouth once daily.    glipiZIDE (GLUCOTROL) 5 MG tablet Take 5 mg by mouth once daily.    guaiFENesin (MUCINEX) 1,200 mg Ta12 Take 1,200 mg by mouth 2 (two) times a day.    lisinopriL (PRINIVIL,ZESTRIL) 30 MG tablet Take 30 mg by mouth once daily.     magnesium 250 mg Tab Take 1 tablet by mouth once daily.    multivitamin (THERAGRAN) per tablet Take 1 tablet by mouth once daily.    omeprazole (PRILOSEC) 40 MG capsule Take 40 mg by mouth once daily.    ondansetron (ZOFRAN-ODT) 4 MG TbDL Take 1 tablet by mouth every 8 (eight) hours as needed.    rosuvastatin (CRESTOR) 40 MG Tab Take 40 mg by mouth once daily.    atenolol (TENORMIN) 100 MG tablet Take 100 mg by mouth once daily.    azithromycin (Z-LISSET) 250 MG tablet Take 250 mg by mouth.    ciprofloxacin HCl (CIPRO) 500 MG tablet Take 1 tablet (500 mg total) by mouth every 12 (twelve) hours.     glimepiride (AMARYL) 4 MG tablet Take 4 mg by mouth.    glipiZIDE (GLUCOTROL) 5 MG tablet Take 5 mg by mouth 2 (two) times daily before meals.    liraglutide 0.6 mg/0.1 mL, 18 mg/3 mL, subq PNIJ (VICTOZA 2-LISSET) 0.6 mg/0.1 mL (18 mg/3 mL) PnIj pen Inject 1.2 mg into the skin.    metroNIDAZOLE (FLAGYL) 500 MG tablet Take 500 mg by mouth 3 (three) times daily.    pantoprazole (PROTONIX) 40 MG tablet Take 40 mg by mouth once daily.    promethazine-dextromethorphan (PROMETHAZINE-DM) 6.25-15 mg/5 mL Syrp Take 5-10 mLs by mouth every 6 (six) hours as needed.    semaglutide (OZEMPIC) 1 mg/dose (2 mg/1.5 mL) PnIj Inject 1 mg into the skin every 7 days.     tiZANidine (ZANAFLEX) 4 MG tablet Take 4 mg by mouth every 8 (eight) hours.      Family History       Problem Relation (Age of Onset)    Hypertension Mother          Tobacco Use    Smoking status: Never    Smokeless tobacco: Never   Substance and Sexual Activity    Alcohol use: Not Currently    Drug use: Not on file    Sexual activity: Not on file     Review of Systems   Constitutional:  Positive for fatigue and fever. Negative for activity change and appetite change.   HENT:  Negative for congestion and dental problem.    Eyes:  Negative for discharge and itching.   Respiratory:  Positive for cough. Negative for shortness of breath.    Cardiovascular:  Negative for chest pain.   Gastrointestinal:  Negative for abdominal distention and abdominal pain.   Endocrine: Negative for cold intolerance.   Genitourinary:  Negative for difficulty urinating and dysuria.   Musculoskeletal:  Negative for arthralgias and back pain.   Skin:  Negative for color change.   Neurological:  Negative for dizziness and facial asymmetry.   Hematological:  Negative for adenopathy.   Psychiatric/Behavioral:  Negative for agitation and behavioral problems.    Objective:     Vital Signs (Most Recent):  Temp: 99.3 °F (37.4 °C) (12/30/22 2148)  Pulse: 75 (12/30/22 2314)  Resp: (!) 22 (12/30/22  2314)  BP: (!) 115/57 (12/30/22 2314)  SpO2: (!) 91 % (12/30/22 2314)   Vital Signs (24h Range):  Temp:  [99.3 °F (37.4 °C)-100.2 °F (37.9 °C)] 99.3 °F (37.4 °C)  Pulse:  [75-88] 75  Resp:  [16-28] 22  SpO2:  [82 %-91 %] 91 %  BP: ()/(50-82) 115/57     Weight: 73.9 kg (163 lb)  Body mass index is 26.31 kg/m².    Physical Exam  Vitals and nursing note reviewed.   Constitutional:       General: She is not in acute distress.  HENT:      Head: Atraumatic.      Right Ear: External ear normal.      Left Ear: External ear normal.      Nose: Nose normal.      Mouth/Throat:      Mouth: Mucous membranes are moist.   Cardiovascular:      Rate and Rhythm: Normal rate.   Pulmonary:      Effort: Pulmonary effort is normal.      Breath sounds: Rales present.   Musculoskeletal:         General: Normal range of motion.      Cervical back: Normal range of motion.   Skin:     General: Skin is warm.   Neurological:      Mental Status: She is alert and oriented to person, place, and time.   Psychiatric:         Behavior: Behavior normal.           Significant Labs: All pertinent labs within the past 24 hours have been reviewed.  CBC:   Recent Labs   Lab 12/30/22 1924   WBC 7.42  7.42   HGB 11.7*  11.7*   HCT 35.9*  35.9*     177     CMP:   Recent Labs   Lab 12/30/22 1924   *  132*   K 3.4*  3.4*   CL 97  97   CO2 26  26   *  139*   BUN 15  15   CREATININE 0.8  0.8   CALCIUM 8.4*  8.4*   PROT 7.0  7.0   ALBUMIN 3.6  3.6   BILITOT 1.0  1.0   ALKPHOS 65  65   AST 27  27   ALT 16  16   ANIONGAP 9  9       Significant Imaging: I have reviewed all pertinent imaging results/findings within the past 24 hours.

## 2022-12-31 NOTE — PROGRESS NOTES
[facility] Medicine  Progress Note    Patient name: Doris Hoover  MRN: [id number]  Admit Date: 12/30/2022   LOS: 1 day     SUBJECTIVE:     Principal problem: Pneumonia due to infectious organism    Interval History:  Admitted last night for right lower lobe pneumonia.  Escalating O2 requirements noted; now requiring 11 L of oxygen via high-flow nasal cannula.  He endorses generalized weakness and cough.  Low-grade fever last night.  Blood pressure is in the low-normal range.    Summary:   62-year-old  female with type 2 DM and essential hypertension presented to the ED with chief complaint of fever and cough of 2 day onset.  Positive for sick contacts.  Found to have right lower lobe pneumonia.  Being treated for acute hypoxic respiratory failure secondary to pneumonia.  MRSA screen negative.    Scheduled Meds:   albuterol-ipratropium  3 mL Nebulization Q4H    enoxparin  40 mg Subcutaneous Q24H    guaiFENesin  600 mg Oral BID    [START ON 1/1/2023] levoFLOXacin  750 mg Intravenous Q24H    pantoprazole  40 mg Oral Daily    piperacillin-tazobactam (ZOSYN) IVPB  4.5 g Intravenous Q8H    senna-docusate 8.6-50 mg  1 tablet Oral BID     Continuous Infusions:   sodium chloride 0.9% 100 mL/hr at 12/31/22 0843     PRN Meds:acetaminophen, albuterol-ipratropium, ALPRAZolam, benzonatate, dextrose 10%, dextrose 10%, glucagon (human recombinant), glucose, glucose, insulin aspart U-100, magnesium oxide, magnesium oxide, ondansetron, potassium bicarbonate, potassium bicarbonate, potassium bicarbonate, potassium, sodium phosphates, potassium, sodium phosphates, potassium, sodium phosphates    Review of patient's allergies indicates:   Allergen Reactions    Butorphanol tartrate Other (See Comments)     Hallucinations  hallucinations      Methylprednisolone Other (See Comments) and Shortness Of Breath     flushing  flushing         Review of Systems: As per interval history    OBJECTIVE:     Vital  Signs (Most Recent)  Temp: 98.4 °F (36.9 °C) (12/31/22 1150)  Pulse: 95 (12/31/22 1302)  Resp: (!) 26 (12/31/22 1302)  BP: (!) 113/58 (12/31/22 1150)  SpO2: (!) 94 % (12/31/22 1302)    Vital Signs Range (Last 24H):  Temp:  [98.4 °F (36.9 °C)-100.2 °F (37.9 °C)]   Pulse:  [73-95]   Resp:  [16-28]   BP: ()/(50-82)   SpO2:  [82 %-95 %]     I & O (Last 24H):  Intake/Output Summary (Last 24 hours) at 12/31/2022 1412  Last data filed at 12/30/2022 2350  Gross per 24 hour   Intake 100 ml   Output --   Net 100 ml       Physical Exam:  General:  Ill-appearing.  No acute distress.  Eyes: No conjunctival injection. No scleral icterus.  ENT: Hearing grossly intact. No discharge from ears. No nasal discharge.   Neck: Supple, trachea midline. No JVD  CVS: RRR. No LE edema BL  Lungs:  Tachypnea noted.  No accessory muscle use.  Coarse rhonchi over the posterior aspect of right lower lobe.  Abdomen:  Soft, nontender and nondistended.  No organomegaly  Neuro: AOx3. Moves all extremities. Follows commands. Responds appropriately   Skin:  No rash or erythema noted    Laboratory:  I have reviewed all pertinent lab results within the past 24 hours.  CBC:   Recent Labs   Lab 12/31/22  0711   WBC 5.77   RBC 3.57*   HGB 10.9*   HCT 33.4*   *   MCV 94   MCH 30.5   MCHC 32.6     CMP:   Recent Labs   Lab 12/31/22  0711   *   CALCIUM 7.8*   ALBUMIN 3.0*   PROT 5.7*   *   K 3.4*   CO2 21*   CL 98   BUN 9   CREATININE 0.6   ALKPHOS 55   ALT 15   AST 25   BILITOT 0.9       Diagnostic Results:  Labs: Reviewed  X-Ray: Reviewed    ASSESSMENT/PLAN:       Active Hospital Problems    Diagnosis  POA    *Pneumonia due to infectious organism [J18.9]  Unknown    Hypoxemia requiring supplemental oxygen [R09.02, Z99.81]  Yes    Acute hypoxemic respiratory failure [J96.01]  Unknown    Essential hypertension [I10]  Yes    Type 2 diabetes mellitus, without long-term current use of insulin [E11.9]  Yes      Resolved Hospital Problems    No resolved problems to display.       Plan:   Acute hypoxic respiratory failure due to right lower lobe pneumonia   Chest x-ray this morning with worsening alveolar opacities involving the right lung   MRSA PCR screen negative  Continue levofloxacin and piperacillin-tazobactam   Follow respiratory and blood cultures  Appreciate pulmonology input   Wean supplemental oxygen as tolerated   Symptomatic care with bronchodilators and antitussives  Continue IV fluids for dehydration and hypotension. Hold home antihypertensives    VTE Risk Mitigation (From admission, onward)           Ordered     enoxaparin injection 40 mg  Every 24 hours (non-standard times)         12/30/22 2321     IP VTE LOW RISK PATIENT  Once         12/30/22 2318     Place sequential compression device  Until discontinued         12/30/22 2318                        Department Hospital Medicine  [facility]  Dirk Amaro MD  Date of service: 12/31/2022

## 2022-12-31 NOTE — ASSESSMENT & PLAN NOTE
Extensive R sided pneumonia  Start iv levofloxacin / iv zosyn   Other supportive measures including O2

## 2023-01-01 LAB
ALBUMIN SERPL BCP-MCNC: 2.9 G/DL (ref 3.5–5.2)
ALP SERPL-CCNC: 51 U/L (ref 55–135)
ALT SERPL W/O P-5'-P-CCNC: 15 U/L (ref 10–44)
ANION GAP SERPL CALC-SCNC: 6 MMOL/L (ref 8–16)
AST SERPL-CCNC: 29 U/L (ref 10–40)
BILIRUB SERPL-MCNC: 0.8 MG/DL (ref 0.1–1)
BUN SERPL-MCNC: 6 MG/DL (ref 8–23)
CALCIUM SERPL-MCNC: 8 MG/DL (ref 8.7–10.5)
CHLORIDE SERPL-SCNC: 99 MMOL/L (ref 95–110)
CO2 SERPL-SCNC: 28 MMOL/L (ref 23–29)
CREAT SERPL-MCNC: 0.6 MG/DL (ref 0.5–1.4)
ERYTHROCYTE [DISTWIDTH] IN BLOOD BY AUTOMATED COUNT: 13.1 % (ref 11.5–14.5)
EST. GFR  (NO RACE VARIABLE): >60 ML/MIN/1.73 M^2
GLUCOSE SERPL-MCNC: 135 MG/DL (ref 70–110)
GLUCOSE SERPL-MCNC: 150 MG/DL (ref 70–110)
GLUCOSE SERPL-MCNC: 166 MG/DL (ref 70–110)
GLUCOSE SERPL-MCNC: 175 MG/DL (ref 70–110)
GLUCOSE SERPL-MCNC: 176 MG/DL (ref 70–110)
HCT VFR BLD AUTO: 32.9 % (ref 37–48.5)
HGB BLD-MCNC: 10.5 G/DL (ref 12–16)
MCH RBC QN AUTO: 29.9 PG (ref 27–31)
MCHC RBC AUTO-ENTMCNC: 31.9 G/DL (ref 32–36)
MCV RBC AUTO: 94 FL (ref 82–98)
PLATELET # BLD AUTO: 140 K/UL (ref 150–450)
PMV BLD AUTO: 11 FL (ref 9.2–12.9)
POTASSIUM SERPL-SCNC: 3.1 MMOL/L (ref 3.5–5.1)
PROCALCITONIN SERPL IA-MCNC: <0.05 NG/ML (ref 0–0.5)
PROT SERPL-MCNC: 5.9 G/DL (ref 6–8.4)
RBC # BLD AUTO: 3.51 M/UL (ref 4–5.4)
SODIUM SERPL-SCNC: 133 MMOL/L (ref 136–145)
WBC # BLD AUTO: 3.77 K/UL (ref 3.9–12.7)

## 2023-01-01 PROCEDURE — 99233 PR SUBSEQUENT HOSPITAL CARE,LEVL III: ICD-10-PCS | Mod: ,,, | Performed by: INTERNAL MEDICINE

## 2023-01-01 PROCEDURE — 25000242 PHARM REV CODE 250 ALT 637 W/ HCPCS: Performed by: INTERNAL MEDICINE

## 2023-01-01 PROCEDURE — 27000221 HC OXYGEN, UP TO 24 HOURS

## 2023-01-01 PROCEDURE — 94761 N-INVAS EAR/PLS OXIMETRY MLT: CPT

## 2023-01-01 PROCEDURE — 63600175 PHARM REV CODE 636 W HCPCS: Performed by: INTERNAL MEDICINE

## 2023-01-01 PROCEDURE — 99900031 HC PATIENT EDUCATION (STAT)

## 2023-01-01 PROCEDURE — 25000003 PHARM REV CODE 250: Performed by: INTERNAL MEDICINE

## 2023-01-01 PROCEDURE — 99900035 HC TECH TIME PER 15 MIN (STAT)

## 2023-01-01 PROCEDURE — 85027 COMPLETE CBC AUTOMATED: CPT | Performed by: INTERNAL MEDICINE

## 2023-01-01 PROCEDURE — 99233 SBSQ HOSP IP/OBS HIGH 50: CPT | Mod: ,,, | Performed by: INTERNAL MEDICINE

## 2023-01-01 PROCEDURE — 84145 PROCALCITONIN (PCT): CPT | Performed by: INTERNAL MEDICINE

## 2023-01-01 PROCEDURE — 94640 AIRWAY INHALATION TREATMENT: CPT

## 2023-01-01 PROCEDURE — 12000002 HC ACUTE/MED SURGE SEMI-PRIVATE ROOM

## 2023-01-01 PROCEDURE — 94799 UNLISTED PULMONARY SVC/PX: CPT

## 2023-01-01 PROCEDURE — 80053 COMPREHEN METABOLIC PANEL: CPT | Performed by: INTERNAL MEDICINE

## 2023-01-01 RX ORDER — IPRATROPIUM BROMIDE AND ALBUTEROL SULFATE 2.5; .5 MG/3ML; MG/3ML
3 SOLUTION RESPIRATORY (INHALATION) EVERY 6 HOURS
Status: DISCONTINUED | OUTPATIENT
Start: 2023-01-02 | End: 2023-01-05 | Stop reason: HOSPADM

## 2023-01-01 RX ADMIN — ALPRAZOLAM 0.5 MG: 0.5 TABLET ORAL at 04:01

## 2023-01-01 RX ADMIN — LEVOFLOXACIN 750 MG: 5 INJECTION, SOLUTION INTRAVENOUS at 06:01

## 2023-01-01 RX ADMIN — ONDANSETRON 4 MG: 2 INJECTION INTRAMUSCULAR; INTRAVENOUS at 09:01

## 2023-01-01 RX ADMIN — IBUPROFEN 600 MG: 200 TABLET, FILM COATED ORAL at 06:01

## 2023-01-01 RX ADMIN — GUAIFENESIN 600 MG: 600 TABLET, EXTENDED RELEASE ORAL at 09:01

## 2023-01-01 RX ADMIN — IPRATROPIUM BROMIDE AND ALBUTEROL SULFATE 3 ML: .5; 3 SOLUTION RESPIRATORY (INHALATION) at 04:01

## 2023-01-01 RX ADMIN — IPRATROPIUM BROMIDE AND ALBUTEROL SULFATE 3 ML: .5; 3 SOLUTION RESPIRATORY (INHALATION) at 12:01

## 2023-01-01 RX ADMIN — SENNOSIDES AND DOCUSATE SODIUM 1 TABLET: 50; 8.6 TABLET ORAL at 08:01

## 2023-01-01 RX ADMIN — IPRATROPIUM BROMIDE AND ALBUTEROL SULFATE 3 ML: .5; 3 SOLUTION RESPIRATORY (INHALATION) at 10:01

## 2023-01-01 RX ADMIN — ONDANSETRON 4 MG: 2 INJECTION INTRAMUSCULAR; INTRAVENOUS at 07:01

## 2023-01-01 RX ADMIN — ACETAMINOPHEN 650 MG: 325 TABLET ORAL at 08:01

## 2023-01-01 RX ADMIN — IPRATROPIUM BROMIDE AND ALBUTEROL SULFATE 3 ML: .5; 3 SOLUTION RESPIRATORY (INHALATION) at 08:01

## 2023-01-01 RX ADMIN — PIPERACILLIN SODIUM AND TAZOBACTAM SODIUM 4.5 G: 4; .5 INJECTION, POWDER, LYOPHILIZED, FOR SOLUTION INTRAVENOUS at 01:01

## 2023-01-01 RX ADMIN — PANTOPRAZOLE SODIUM 40 MG: 40 TABLET, DELAYED RELEASE ORAL at 06:01

## 2023-01-01 RX ADMIN — IBUPROFEN 600 MG: 200 TABLET, FILM COATED ORAL at 02:01

## 2023-01-01 RX ADMIN — BENZONATATE 100 MG: 100 CAPSULE ORAL at 08:01

## 2023-01-01 RX ADMIN — GUAIFENESIN 600 MG: 600 TABLET, EXTENDED RELEASE ORAL at 08:01

## 2023-01-01 RX ADMIN — PIPERACILLIN SODIUM AND TAZOBACTAM SODIUM 4.5 G: 4; .5 INJECTION, POWDER, LYOPHILIZED, FOR SOLUTION INTRAVENOUS at 09:01

## 2023-01-01 RX ADMIN — IBUPROFEN 600 MG: 200 TABLET, FILM COATED ORAL at 09:01

## 2023-01-01 RX ADMIN — ENOXAPARIN SODIUM 40 MG: 100 INJECTION SUBCUTANEOUS at 06:01

## 2023-01-01 RX ADMIN — PIPERACILLIN SODIUM AND TAZOBACTAM SODIUM 4.5 G: 4; .5 INJECTION, POWDER, LYOPHILIZED, FOR SOLUTION INTRAVENOUS at 04:01

## 2023-01-01 RX ADMIN — ONDANSETRON 4 MG: 2 INJECTION INTRAMUSCULAR; INTRAVENOUS at 02:01

## 2023-01-01 RX ADMIN — SODIUM CHLORIDE: 0.9 INJECTION, SOLUTION INTRAVENOUS at 01:01

## 2023-01-01 NOTE — CARE UPDATE
01/01/23 0800   Patient Assessment/Suction   Level of Consciousness (AVPU) alert   Respiratory Effort Unlabored;Normal   Aerosol Therapy   $ Aerosol Therapy Charges Refused   Daily Review of Necessity (SVN) completed   Respiratory Treatment Status (SVN) refused   Education   $ Education 15 min   Respiratory Evaluation   $ Care Plan Tech Time 15 min   $ Eval/Re-eval Charges Evaluation   Evaluation For   (care plan)

## 2023-01-01 NOTE — PROGRESS NOTES
Pulmonary/Critical Care  Progress Note      Patient name: Doris Hoover  MRN: [id number]  Date: 2023    Admit Date: 2022  Consult Requested By: Dirk Amaro MD    Reason for Consult: Pneumonia    HPI:    2022 - 63 yo sick for 4-5 days - went to Urgent Care and treated with po zithromax didn't get better and came to ER.  She has tested negative for Covid and flu.  CTA negative for clots but has extensive right pneumonia.  ROS as below.  Has required 4-7 LPM.  Did have a family member who was sick.  Denies HIV risk factors    2023 - Stable overnight, no new issues reported, feels better.  Has some cough with sputum production.  No new labs this AM.  On 10 LPM O2    Review of Systems    Review of Systems   Constitutional:  Positive for fever and malaise/fatigue. Negative for chills, diaphoresis and weight loss.   HENT:  Negative for congestion.    Eyes:  Negative for pain.   Respiratory:  Positive for cough, sputum production (minimal) and shortness of breath. Negative for hemoptysis, wheezing and stridor.    Cardiovascular:  Positive for chest pain (with cough). Negative for palpitations, orthopnea, claudication, leg swelling and PND.   Gastrointestinal:  Positive for nausea. Negative for abdominal pain, blood in stool, constipation, diarrhea, heartburn and vomiting.   Genitourinary:  Negative for dysuria, frequency and urgency.   Musculoskeletal:  Positive for myalgias (minimal). Negative for falls.   Neurological:  Positive for weakness. Negative for sensory change and focal weakness.   Psychiatric/Behavioral:  Negative for depression, substance abuse and suicidal ideas. The patient is not nervous/anxious.      Past Medical History    Past Medical History:   Diagnosis Date    Allergy     Diabetes mellitus, type 2     Hypertension        Past Surgical History    Past Surgical History:   Procedure Laterality Date     SECTION      3    HYSTERECTOMY      TONSILLECTOMY    "      Medications (scheduled):      albuterol-ipratropium  3 mL Nebulization Q4H    enoxparin  40 mg Subcutaneous Q24H    guaiFENesin  600 mg Oral BID    levoFLOXacin  750 mg Intravenous Q24H    pantoprazole  40 mg Oral Daily    piperacillin-tazobactam (ZOSYN) IVPB  4.5 g Intravenous Q8H    senna-docusate 8.6-50 mg  1 tablet Oral BID       Medications (infusions):      sodium chloride 0.9% 100 mL/hr at 01/01/23 0132       Medications (prn):     acetaminophen, albuterol-ipratropium, ALPRAZolam, benzonatate, dextrose 10%, dextrose 10%, glucagon (human recombinant), glucose, glucose, ibuprofen, insulin aspart U-100, magnesium oxide, magnesium oxide, ondansetron, potassium bicarbonate, potassium bicarbonate, potassium bicarbonate, potassium, sodium phosphates, potassium, sodium phosphates, potassium, sodium phosphates    Family History:   Family History   Problem Relation Age of Onset    Hypertension Mother        Social History: Tobacco:   Social History     Tobacco Use   Smoking Status Never   Smokeless Tobacco Never                                EtOH:   Social History     Substance and Sexual Activity   Alcohol Use Not Currently                                Drugs:   Social History     Substance and Sexual Activity   Drug Use Not on file         Physical Exam    Vital signs:  Temp:  [98.2 °F (36.8 °C)-99.1 °F (37.3 °C)]   Pulse:  [75-95]   Resp:  [17-28]   BP: (113-166)/(58-76)   SpO2:  [84 %-95 %]     Intake/Output: No intake or output data in the 24 hours ending 01/01/23 0806       BMI: Estimated body mass index is 26.3 kg/m² as calculated from the following:    Height as of this encounter: 5' 6" (1.676 m).    Weight as of this encounter: 73.9 kg (162 lb 14.7 oz).    Physical Exam  Vitals and nursing note reviewed.   Constitutional:       General: She is not in acute distress.     Appearance: Normal appearance. She is ill-appearing. She is not toxic-appearing or diaphoretic.   HENT:      Head: Normocephalic and " atraumatic.      Right Ear: External ear normal.      Left Ear: External ear normal.      Nose: Nose normal. No congestion or rhinorrhea.      Mouth/Throat:      Mouth: Mucous membranes are moist.      Pharynx: Oropharynx is clear. No oropharyngeal exudate or posterior oropharyngeal erythema.   Eyes:      General: No scleral icterus.        Right eye: No discharge.         Left eye: No discharge.      Extraocular Movements: Extraocular movements intact.      Conjunctiva/sclera: Conjunctivae normal.      Pupils: Pupils are equal, round, and reactive to light.   Neck:      Vascular: No carotid bruit.   Cardiovascular:      Rate and Rhythm: Normal rate and regular rhythm.      Pulses: Normal pulses.      Heart sounds: No murmur heard.    No friction rub. No gallop.   Pulmonary:      Effort: Pulmonary effort is normal. No respiratory distress.      Breath sounds: No stridor. Rhonchi (right side) present. No wheezing or rales.      Comments: No acc m use  Chest:      Chest wall: No tenderness.   Abdominal:      General: Bowel sounds are normal. There is no distension.      Tenderness: There is no abdominal tenderness. There is no guarding.   Musculoskeletal:         General: No swelling. Normal range of motion.      Cervical back: Normal range of motion and neck supple. No rigidity or tenderness.      Right lower leg: No edema.      Left lower leg: No edema.   Lymphadenopathy:      Cervical: No cervical adenopathy.   Skin:     General: Skin is warm and dry.      Capillary Refill: Capillary refill takes less than 2 seconds.      Coloration: Skin is not jaundiced.      Findings: No bruising.   Neurological:      General: No focal deficit present.      Mental Status: She is alert and oriented to person, place, and time. Mental status is at baseline.      Cranial Nerves: No cranial nerve deficit.      Sensory: No sensory deficit.      Motor: No weakness.   Psychiatric:         Mood and Affect: Mood normal.         Behavior:  Behavior normal.         Thought Content: Thought content normal.         Judgment: Judgment normal.       Laboratory    No results for input(s): WBC, RBC, HGB, HCT, PLT, MCV, MCH, MCHC in the last 24 hours.      No results for input(s): GLUCOSE, CALCIUM, PROT, NA, K, CO2, CL, BUN, CREATININE, ALKPHOS, ALT, AST, BILITOT in the last 24 hours.    Invalid input(s):  ALBUMIN      No results for input(s): PT, INR, APTT in the last 24 hours.    No results for input(s): CPK, CPKMB, TROPONINI, MB in the last 24 hours.      Additional labs: reviewed    Microbiology:       Microbiology Results (last 7 days)       Procedure Component Value Units Date/Time    Blood culture x two cultures. Draw prior to antibiotics. [[id number]] Collected: 12/30/22 1924    Order Status: Completed Specimen: Blood from Peripheral, Antecubital, Left Updated: 12/31/22 2032     Blood Culture, Routine No Growth to date      No Growth to date    Narrative:      Aerobic and anaerobic    Blood culture x two cultures. Draw prior to antibiotics. [[id number]] Collected: 12/30/22 1940    Order Status: Completed Specimen: Blood from Peripheral, Antecubital, Right Updated: 12/31/22 2032     Blood Culture, Routine No Growth to date      No Growth to date    Narrative:      Aerobic and anaerobic    MRSA Screen by PCR [[id number]] Collected: 12/31/22 0910    Order Status: Completed Specimen: Nasopharyngeal Swab from Nasal Updated: 12/31/22 1051     MRSA SCREEN BY PCR Negative    Urine culture [[id number]] Collected: 12/30/22 2140    Order Status: Completed Specimen: Urine Updated: 12/31/22 0911     Urine Culture, Routine No growth to date    Narrative:      Specimen Source->Urine            Radiology    X-Ray Chest AP Portable  Portable chest x-ray at 9:09 AM is compared to prior study 12/30/2022    Clinical history is pneumonia    There is progression of the groundglass alveolar opacities in the right lung with masslike alveolar opacity in the right lower lobe.  "Findings are suggestive of pneumonia. There is fluid within the minor fissure. The left lung is clear. The heart is normal in size. There are no acute osseous abnormalities.    IMPRESSION: Progression of the alveolar opacities within the right lung compatible with worsening pneumonia    Small right pleural effusion    Electronically signed by:  Faby Bazan MD  12/31/2022 9:42 AM CST Workstation: [id number]        Additional Studies    reviewed    Ventilator Information              No results for input(s): PH, PCO2, PO2, HCO3, POCSATURATED, BE in the last 72 hours.      Impression    Active Hospital Problems    Diagnosis  POA    *Pneumonia due to infectious organism [J18.9]  Unknown    Hypoxemia requiring supplemental oxygen [R09.02, Z99.81]  Yes    Acute hypoxemic respiratory failure [J96.01]  Unknown    Essential hypertension [I10]  Yes    Type 2 diabetes mellitus, without long-term current use of insulin [E11.9]  Yes      Resolved Hospital Problems   No resolved problems to display.       Plan    Continue antibiotics  Prn respiratory treatments  Can probably further narrow antibiotics in the next day or so  Consider steroids but she reports an "allergy"  CXR reviewed  Prophylactic lovenox  OOB as able, increase activity  Clinically feels better    Thank you for this consult.  I will follow with you while the patient is hospitalized.        Pedro Viera MD  [facility] Pulmonary/Critical Care  01/01/2023          "

## 2023-01-01 NOTE — PROGRESS NOTES
[facility] Medicine  Progress Note    Patient name: Doris Hoover  MRN: [id number]  Admit Date: 12/30/2022   LOS: 2 days     SUBJECTIVE:     Principal problem: Pneumonia due to infectious organism    Interval History:  No acute overnight events reported.  She endorses nausea.  Also cough with intermittent streak hemoptysis.  Otherwise she feels slightly improved when compared to yesterday in terms of her shortness of breath.  She is down to 10 L of oxygen.    Summary:   62-year-old  female with type 2 DM and essential hypertension presented to the ED with chief complaint of fever and cough of 2 day onset.  Positive for sick contacts.  Found to have right lower lobe pneumonia.  Being treated for acute hypoxic respiratory failure secondary to pneumonia.  MRSA screen negative.    Scheduled Meds:   albuterol-ipratropium  3 mL Nebulization Q4H    enoxparin  40 mg Subcutaneous Q24H    guaiFENesin  600 mg Oral BID    levoFLOXacin  750 mg Intravenous Q24H    pantoprazole  40 mg Oral Daily    piperacillin-tazobactam (ZOSYN) IVPB  4.5 g Intravenous Q8H    senna-docusate 8.6-50 mg  1 tablet Oral BID     Continuous Infusions:      PRN Meds:acetaminophen, albuterol-ipratropium, ALPRAZolam, benzonatate, dextrose 10%, dextrose 10%, glucagon (human recombinant), glucose, glucose, ibuprofen, insulin aspart U-100, magnesium oxide, magnesium oxide, ondansetron, potassium bicarbonate, potassium bicarbonate, potassium bicarbonate, potassium, sodium phosphates, potassium, sodium phosphates, potassium, sodium phosphates    Review of patient's allergies indicates:   Allergen Reactions    Butorphanol tartrate Other (See Comments)     Hallucinations  hallucinations      Methylprednisolone Other (See Comments) and Shortness Of Breath     flushing  flushing         Review of Systems: As per interval history    OBJECTIVE:     Vital Signs (Most Recent)  Temp: 98.5 °F (36.9 °C) (01/01/23 1221)  Pulse: 88  (01/01/23 1221)  Resp: 18 (01/01/23 1221)  BP: 120/74 (01/01/23 1221)  SpO2: 96 % (01/01/23 1221)    Vital Signs Range (Last 24H):  Temp:  [98.2 °F (36.8 °C)-99.1 °F (37.3 °C)]   Pulse:  [75-91]   Resp:  [16-20]   BP: (120-166)/(69-76)   SpO2:  [92 %-96 %]     I & O (Last 24H):  Intake/Output Summary (Last 24 hours) at 1/1/2023 1510  Last data filed at 1/1/2023 1221  Gross per 24 hour   Intake 480 ml   Output --   Net 480 ml         Physical Exam:  General:  Ill-appearing.  No acute distress.  Eyes: No conjunctival injection. No scleral icterus.  ENT: Hearing grossly intact. No discharge from ears. No nasal discharge.   Neck: Supple, trachea midline. No JVD  CVS: RRR. No LE edema BL  Lungs:  Tachypnea noted.  No accessory muscle use.  Coarse rhonchi over the posterior aspect of right lower lobe.  Abdomen:  Soft, nontender and nondistended.  No organomegaly  Neuro: AOx3. Moves all extremities. Follows commands. Responds appropriately   Skin:  No rash or erythema noted    Laboratory:  I have reviewed all pertinent lab results within the past 24 hours.  CBC:   Recent Labs   Lab 01/01/23  0815   WBC 3.77*   RBC 3.51*   HGB 10.5*   HCT 32.9*   *   MCV 94   MCH 29.9   MCHC 31.9*       CMP:   Recent Labs   Lab 01/01/23  0815   *   CALCIUM 8.0*   ALBUMIN 2.9*   PROT 5.9*   *   K 3.1*   CO2 28   CL 99   BUN 6*   CREATININE 0.6   ALKPHOS 51*   ALT 15   AST 29   BILITOT 0.8         Diagnostic Results:  Labs: Reviewed    ASSESSMENT/PLAN:       Active Hospital Problems    Diagnosis  POA    *Pneumonia due to infectious organism [J18.9]  Unknown    Hypoxemia requiring supplemental oxygen [R09.02, Z99.81]  Yes    Acute hypoxemic respiratory failure [J96.01]  Unknown    Essential hypertension [I10]  Yes    Type 2 diabetes mellitus, without long-term current use of insulin [E11.9]  Yes      Resolved Hospital Problems   No resolved problems to display.       Plan:   Acute hypoxic respiratory failure due to right  lower lobe pneumonia   Chest x-ray this morning with worsening alveolar opacities involving the right lung   MRSA PCR screen negative  Continue levofloxacin and piperacillin-tazobactam   Follow respiratory and blood cultures  Appreciate pulmonology input   Wean supplemental oxygen as tolerated   Symptomatic care with bronchodilators and antitussives  Continue IV fluids for dehydration and hypotension. Hold home antihypertensives    VTE Risk Mitigation (From admission, onward)           Ordered     enoxaparin injection 40 mg  Every 24 hours (non-standard times)         12/30/22 2321     IP VTE LOW RISK PATIENT  Once         12/30/22 2318     Place sequential compression device  Until discontinued         12/30/22 2318                        Department Hospital Medicine  [facility]  Dirk Amaro MD  Date of service: 01/01/2023

## 2023-01-01 NOTE — CARE UPDATE
01/01/23 1049   Patient Assessment/Suction   Level of Consciousness (AVPU) alert   All Lung Fields Breath Sounds clear;coarse   PRE-TX-O2   Device (Oxygen Therapy) high flow nasal cannula   $ Is the patient on Low Flow Oxygen? Yes   Flow (L/min) 10   SpO2 (!) 93 %   Pulse Oximetry Type Intermittent   $ Pulse Oximetry - Multiple Charge Pulse Oximetry - Multiple   Pulse 91   Resp 16   Aerosol Therapy   $ Aerosol Therapy Charges Aerosol Treatment   Daily Review of Necessity (SVN) completed   Respiratory Treatment Status (SVN) given   Treatment Route (SVN) mouthpiece   Patient Position (SVN) semi-Fraire's   Post Treatment Assessment (SVN) increased aeration   Signs of Intolerance (SVN) none   Breath Sounds Post-Respiratory Treatment   Throughout All Fields Post-Treatment aeration increased   Post-treatment Heart Rate (beats/min) 92   Post-treatment Resp Rate (breaths/min) 15   Education   $ Education Bronchodilator;15 min   Respiratory Evaluation   $ Care Plan Tech Time 15 min

## 2023-01-02 LAB
ALBUMIN SERPL BCP-MCNC: 2.7 G/DL (ref 3.5–5.2)
ALP SERPL-CCNC: 48 U/L (ref 55–135)
ALT SERPL W/O P-5'-P-CCNC: 15 U/L (ref 10–44)
ANION GAP SERPL CALC-SCNC: 9 MMOL/L (ref 8–16)
AST SERPL-CCNC: 32 U/L (ref 10–40)
BACTERIA UR CULT: NO GROWTH
BILIRUB SERPL-MCNC: 0.8 MG/DL (ref 0.1–1)
BUN SERPL-MCNC: 6 MG/DL (ref 8–23)
CALCIUM SERPL-MCNC: 8.2 MG/DL (ref 8.7–10.5)
CHLORIDE SERPL-SCNC: 97 MMOL/L (ref 95–110)
CO2 SERPL-SCNC: 30 MMOL/L (ref 23–29)
CREAT SERPL-MCNC: 0.5 MG/DL (ref 0.5–1.4)
ERYTHROCYTE [DISTWIDTH] IN BLOOD BY AUTOMATED COUNT: 13.2 % (ref 11.5–14.5)
EST. GFR  (NO RACE VARIABLE): >60 ML/MIN/1.73 M^2
GLUCOSE SERPL-MCNC: 127 MG/DL (ref 70–110)
GLUCOSE SERPL-MCNC: 129 MG/DL (ref 70–110)
GLUCOSE SERPL-MCNC: 136 MG/DL (ref 70–110)
GLUCOSE SERPL-MCNC: 168 MG/DL (ref 70–110)
GLUCOSE SERPL-MCNC: 180 MG/DL (ref 70–110)
HCT VFR BLD AUTO: 32 % (ref 37–48.5)
HGB BLD-MCNC: 10.2 G/DL (ref 12–16)
MCH RBC QN AUTO: 29.7 PG (ref 27–31)
MCHC RBC AUTO-ENTMCNC: 31.9 G/DL (ref 32–36)
MCV RBC AUTO: 93 FL (ref 82–98)
PLATELET # BLD AUTO: 143 K/UL (ref 150–450)
PMV BLD AUTO: 10.8 FL (ref 9.2–12.9)
POTASSIUM SERPL-SCNC: 2.8 MMOL/L (ref 3.5–5.1)
PROCALCITONIN SERPL IA-MCNC: <0.05 NG/ML (ref 0–0.5)
PROT SERPL-MCNC: 5.5 G/DL (ref 6–8.4)
RBC # BLD AUTO: 3.43 M/UL (ref 4–5.4)
SODIUM SERPL-SCNC: 136 MMOL/L (ref 136–145)
WBC # BLD AUTO: 2.9 K/UL (ref 3.9–12.7)

## 2023-01-02 PROCEDURE — 94640 AIRWAY INHALATION TREATMENT: CPT

## 2023-01-02 PROCEDURE — 84145 PROCALCITONIN (PCT): CPT | Performed by: INTERNAL MEDICINE

## 2023-01-02 PROCEDURE — 99233 PR SUBSEQUENT HOSPITAL CARE,LEVL III: ICD-10-PCS | Mod: ,,, | Performed by: INTERNAL MEDICINE

## 2023-01-02 PROCEDURE — 25000003 PHARM REV CODE 250: Performed by: INTERNAL MEDICINE

## 2023-01-02 PROCEDURE — 27000221 HC OXYGEN, UP TO 24 HOURS

## 2023-01-02 PROCEDURE — 94761 N-INVAS EAR/PLS OXIMETRY MLT: CPT

## 2023-01-02 PROCEDURE — 12000002 HC ACUTE/MED SURGE SEMI-PRIVATE ROOM

## 2023-01-02 PROCEDURE — 80053 COMPREHEN METABOLIC PANEL: CPT | Performed by: INTERNAL MEDICINE

## 2023-01-02 PROCEDURE — 99233 SBSQ HOSP IP/OBS HIGH 50: CPT | Mod: ,,, | Performed by: INTERNAL MEDICINE

## 2023-01-02 PROCEDURE — 99900031 HC PATIENT EDUCATION (STAT)

## 2023-01-02 PROCEDURE — 36415 COLL VENOUS BLD VENIPUNCTURE: CPT | Performed by: INTERNAL MEDICINE

## 2023-01-02 PROCEDURE — 63600175 PHARM REV CODE 636 W HCPCS: Performed by: INTERNAL MEDICINE

## 2023-01-02 PROCEDURE — 25000242 PHARM REV CODE 250 ALT 637 W/ HCPCS: Performed by: INTERNAL MEDICINE

## 2023-01-02 PROCEDURE — 85027 COMPLETE CBC AUTOMATED: CPT | Performed by: INTERNAL MEDICINE

## 2023-01-02 PROCEDURE — 94799 UNLISTED PULMONARY SVC/PX: CPT

## 2023-01-02 PROCEDURE — 99900035 HC TECH TIME PER 15 MIN (STAT)

## 2023-01-02 RX ADMIN — IPRATROPIUM BROMIDE AND ALBUTEROL SULFATE 3 ML: .5; 3 SOLUTION RESPIRATORY (INHALATION) at 12:01

## 2023-01-02 RX ADMIN — PIPERACILLIN SODIUM AND TAZOBACTAM SODIUM 4.5 G: 4; .5 INJECTION, POWDER, LYOPHILIZED, FOR SOLUTION INTRAVENOUS at 12:01

## 2023-01-02 RX ADMIN — IPRATROPIUM BROMIDE AND ALBUTEROL SULFATE 3 ML: .5; 3 SOLUTION RESPIRATORY (INHALATION) at 08:01

## 2023-01-02 RX ADMIN — ACETAMINOPHEN 650 MG: 325 TABLET ORAL at 07:01

## 2023-01-02 RX ADMIN — IBUPROFEN 600 MG: 200 TABLET, FILM COATED ORAL at 03:01

## 2023-01-02 RX ADMIN — ENOXAPARIN SODIUM 40 MG: 100 INJECTION SUBCUTANEOUS at 06:01

## 2023-01-02 RX ADMIN — ONDANSETRON 4 MG: 2 INJECTION INTRAMUSCULAR; INTRAVENOUS at 06:01

## 2023-01-02 RX ADMIN — LEVOFLOXACIN 750 MG: 5 INJECTION, SOLUTION INTRAVENOUS at 06:01

## 2023-01-02 RX ADMIN — PANTOPRAZOLE SODIUM 40 MG: 40 TABLET, DELAYED RELEASE ORAL at 06:01

## 2023-01-02 RX ADMIN — GUAIFENESIN 600 MG: 600 TABLET, EXTENDED RELEASE ORAL at 08:01

## 2023-01-02 RX ADMIN — ONDANSETRON 4 MG: 2 INJECTION INTRAMUSCULAR; INTRAVENOUS at 12:01

## 2023-01-02 RX ADMIN — ACETAMINOPHEN 650 MG: 325 TABLET ORAL at 08:01

## 2023-01-02 RX ADMIN — PIPERACILLIN SODIUM AND TAZOBACTAM SODIUM 4.5 G: 4; .5 INJECTION, POWDER, LYOPHILIZED, FOR SOLUTION INTRAVENOUS at 09:01

## 2023-01-02 RX ADMIN — POTASSIUM BICARBONATE 60 MEQ: 391 TABLET, EFFERVESCENT ORAL at 09:01

## 2023-01-02 RX ADMIN — POTASSIUM BICARBONATE 60 MEQ: 391 TABLET, EFFERVESCENT ORAL at 11:01

## 2023-01-02 RX ADMIN — BENZONATATE 100 MG: 100 CAPSULE ORAL at 07:01

## 2023-01-02 RX ADMIN — IPRATROPIUM BROMIDE AND ALBUTEROL SULFATE 3 ML: .5; 3 SOLUTION RESPIRATORY (INHALATION) at 07:01

## 2023-01-02 RX ADMIN — GUAIFENESIN 600 MG: 600 TABLET, EXTENDED RELEASE ORAL at 09:01

## 2023-01-02 RX ADMIN — BENZONATATE 100 MG: 100 CAPSULE ORAL at 06:01

## 2023-01-02 RX ADMIN — INSULIN ASPART 2 UNITS: 100 INJECTION, SOLUTION INTRAVENOUS; SUBCUTANEOUS at 04:01

## 2023-01-02 RX ADMIN — ALPRAZOLAM 0.5 MG: 0.5 TABLET ORAL at 08:01

## 2023-01-02 NOTE — CARE UPDATE
01/01/23 2004   Patient Assessment/Suction   Level of Consciousness (AVPU) alert   Respiratory Effort Normal;Unlabored   Expansion/Accessory Muscles/Retractions no use of accessory muscles   All Lung Fields Breath Sounds Anterior:;Lateral:;coarse   CHA Breath Sounds coarse   RUL Breath Sounds coarse   Rhythm/Pattern, Respiratory unlabored   Cough Frequency frequent   Cough Type loose   PRE-TX-O2   Device (Oxygen Therapy) high flow nasal cannula   $ Is the patient on Low Flow Oxygen? Yes   Flow (L/min) 10   SpO2 (!) 94 %   Pulse Oximetry Type Intermittent   $ Pulse Oximetry - Multiple Charge Pulse Oximetry - Multiple   Pulse 90   Resp 18   Aerosol Therapy   $ Aerosol Therapy Charges Aerosol Treatment   Daily Review of Necessity (SVN) completed   Respiratory Treatment Status (SVN) given   Treatment Route (SVN) mouthpiece;oxygen   Patient Position (SVN) HOB elevated   Post Treatment Assessment (SVN) increased aeration   Signs of Intolerance (SVN) none   Breath Sounds Post-Respiratory Treatment   Post-treatment Heart Rate (beats/min) 92   Post-treatment Resp Rate (breaths/min) 20   Education   $ Education Bronchodilator;15 min   Respiratory Evaluation   $ Care Plan Tech Time 15 min

## 2023-01-02 NOTE — PLAN OF CARE
Critical access hospital  Initial Discharge Assessment       Primary Care Provider: Arnold Buchanan NP    Admission Diagnosis: Pneumonia due to infectious organism, unspecified laterality, unspecified part of lung [J18.9]    Admission Date: 2022  Expected Discharge Date: 2023    CM met with patient and patient daughter Stella at bedside.  Patient verified information on face sheet.  Patient reported that her spouse is  and that her son, daughter and her mother live with her.  Patient is not on dialysis or coumadin.  Patient reported that she has access to a nebulizer that was given to her but would really like to have her own.  Patient daughter Stella reported that she will transport the patient home upon discharge.          Discharge Barriers Identified: None    Payor: BLUE CROSS [facility] / Plan: [facility] [place] [facility] EMPLOYEES / Product Type: Commercial /     Extended Emergency Contact Information  Primary Emergency Contact: Gustavo Hoover  Home Phone: [phone]  Mobile Phone: [phone]  Relation: Son  Preferred language: English   needed? No  Secondary Emergency Contact: HooverStella RONEY  Address: [facility] [address]           [place] [address] [facility]  Home Phone: [phone]  Mobile Phone: [phone]  Relation: Daughter    Discharge Plan A: Home with family  Discharge Plan B: Home with family      [id number] [place] - [address] AT [facility] &   [address]  [place] [address]  Phone: [phone] Fax: [phone]    [id number] - [place] - [address] AT [facility] [address]  [address]  [place] [address]  Phone: [phone] Fax: [phone]      Initial Assessment (most recent)       Adult Discharge Assessment - 23 1049          Discharge Assessment    Assessment Type Discharge Planning Assessment     Confirmed/corrected address, phone number  and insurance Yes     Confirmed Demographics Correct on Facesheet     Source of Information patient     Does patient/caregiver understand observation status --   n/a    Communicated PETER with patient/caregiver Date not available/Unable to determine     Reason For Admission Pneumonia due to infectious organism     People in Home child(constantine), adult;parent(s)     Facility Arrived From: home     Do you expect to return to your current living situation? Yes     Do you have help at home or someone to help you manage your care at home? Yes     Who are your caregiver(s) and their phone number(s)? Gustavo Hoover (son) [phone], Stella Hoover (daughter) [phone]     Prior to hospitilization cognitive status: Unable to Assess     Current cognitive status: Alert/Oriented     Walking or Climbing Stairs --   no issues    Dressing/Bathing --   no issues    Equipment Currently Used at Home nebulizer     Patient currently being followed by outpatient case management? No     Do you currently have service(s) that help you manage your care at home? No     Do you take prescription medications? Yes     Do you have prescription coverage? Yes     Coverage Payor:  BLUE CROSS [facility] [employer] [place] [employer]     Do you have any problems affording any of your prescribed medications? No     Is the patient taking medications as prescribed? yes     Who is going to help you get home at discharge? daughter     How do you get to doctors appointments? car, drives self     Are you on dialysis? No     Do you take coumadin? No     Discharge Plan A Home with family     Discharge Plan B Home with family     DME Needed Upon Discharge  nebulizer     Discharge Plan discussed with: Patient     Discharge Barriers Identified None

## 2023-01-02 NOTE — PROGRESS NOTES
Pulmonary/Critical Care  Progress Note      Patient name: Doris Hovoer  MRN: [id number]  Date: 01/02/2023    Admit Date: 12/30/2022  Consult Requested By: Dirk Amaro MD    Reason for Consult: Pneumonia    HPI:    12/31/2022 - 61 yo sick for 4-5 days - went to Urgent Care and treated with po zithromax didn't get better and came to ER.  She has tested negative for Covid and flu.  CTA negative for clots but has extensive right pneumonia.  ROS as below.  Has required 4-7 LPM.  Did have a family member who was sick.  Denies HIV risk factors    1/1/2023 - Stable overnight, no new issues reported, feels better.  Has some cough with sputum production.  No new labs this AM.  On 10 LPM O2    1/2/2023 - Stable overnight, no new issues reported and feels better.  Has been on O2 about 8 LPM this AM.  Does better when encouraged to take deep breaths through her nose  Had increased temp last night but decreased this AM    Review of Systems    Review of Systems   Constitutional:  Positive for fever and malaise/fatigue. Negative for chills, diaphoresis and weight loss.   HENT:  Negative for congestion.    Eyes:  Negative for pain.   Respiratory:  Positive for cough, sputum production (minimal) and shortness of breath. Negative for hemoptysis, wheezing and stridor.    Cardiovascular:  Positive for chest pain (with cough). Negative for palpitations, orthopnea, claudication, leg swelling and PND.   Gastrointestinal:  Positive for nausea. Negative for abdominal pain, blood in stool, constipation, diarrhea, heartburn and vomiting.   Genitourinary:  Negative for dysuria, frequency and urgency.   Musculoskeletal:  Positive for myalgias (minimal). Negative for falls.   Neurological:  Positive for weakness. Negative for sensory change and focal weakness.   Psychiatric/Behavioral:  Negative for depression, substance abuse and suicidal ideas. The patient is not nervous/anxious.      Past Medical History    Past Medical History:  "  Diagnosis Date    Allergy     Diabetes mellitus, type 2     Hypertension        Past Surgical History    Past Surgical History:   Procedure Laterality Date     SECTION      3    HYSTERECTOMY      TONSILLECTOMY         Medications (scheduled):      albuterol-ipratropium  3 mL Nebulization Q6H    enoxparin  40 mg Subcutaneous Q24H    guaiFENesin  600 mg Oral BID    levoFLOXacin  750 mg Intravenous Q24H    pantoprazole  40 mg Oral Daily    piperacillin-tazobactam (ZOSYN) IVPB  4.5 g Intravenous Q8H    senna-docusate 8.6-50 mg  1 tablet Oral BID       Medications (infusions):           Medications (prn):     acetaminophen, albuterol-ipratropium, ALPRAZolam, benzonatate, dextrose 10%, dextrose 10%, glucagon (human recombinant), glucose, glucose, ibuprofen, insulin aspart U-100, magnesium oxide, magnesium oxide, ondansetron, potassium bicarbonate, potassium bicarbonate, potassium bicarbonate, potassium, sodium phosphates, potassium, sodium phosphates, potassium, sodium phosphates    Family History:   Family History   Problem Relation Age of Onset    Hypertension Mother        Social History: Tobacco:   Social History     Tobacco Use   Smoking Status Never   Smokeless Tobacco Never                                EtOH:   Social History     Substance and Sexual Activity   Alcohol Use Not Currently                                Drugs:   Social History     Substance and Sexual Activity   Drug Use Not on file         Physical Exam    Vital signs:  Temp:  [97.7 °F (36.5 °C)-101.3 °F (38.5 °C)]   Pulse:  [70-93]   Resp:  [16-24]   BP: (119-148)/(74-83)   SpO2:  [91 %-99 %]     Intake/Output:   Intake/Output Summary (Last 24 hours) at 2023 0907  Last data filed at 2023 0627  Gross per 24 hour   Intake 1660 ml   Output 1000 ml   Net 660 ml          BMI: Estimated body mass index is 26.3 kg/m² as calculated from the following:    Height as of this encounter: 5' 6" (1.676 m).    Weight as of this encounter: 73.9 kg " (162 lb 14.7 oz).    Physical Exam  Vitals and nursing note reviewed.   Constitutional:       General: She is not in acute distress.     Appearance: Normal appearance. She is ill-appearing. She is not toxic-appearing or diaphoretic.   HENT:      Head: Normocephalic and atraumatic.      Right Ear: External ear normal.      Left Ear: External ear normal.      Nose: Nose normal. No congestion or rhinorrhea.      Mouth/Throat:      Mouth: Mucous membranes are moist.      Pharynx: Oropharynx is clear. No oropharyngeal exudate or posterior oropharyngeal erythema.   Eyes:      General: No scleral icterus.        Right eye: No discharge.         Left eye: No discharge.      Extraocular Movements: Extraocular movements intact.      Conjunctiva/sclera: Conjunctivae normal.      Pupils: Pupils are equal, round, and reactive to light.   Neck:      Vascular: No carotid bruit.   Cardiovascular:      Rate and Rhythm: Normal rate and regular rhythm.      Pulses: Normal pulses.      Heart sounds: No murmur heard.    No friction rub. No gallop.   Pulmonary:      Effort: Pulmonary effort is normal. No respiratory distress.      Breath sounds: No stridor. Rhonchi (right side) present. No wheezing or rales.      Comments: No acc m use  Chest:      Chest wall: No tenderness.   Abdominal:      General: Bowel sounds are normal. There is no distension.      Tenderness: There is no abdominal tenderness. There is no guarding.   Musculoskeletal:         General: No swelling. Normal range of motion.      Cervical back: Normal range of motion and neck supple. No rigidity or tenderness.      Right lower leg: No edema.      Left lower leg: No edema.   Lymphadenopathy:      Cervical: No cervical adenopathy.   Skin:     General: Skin is warm and dry.      Capillary Refill: Capillary refill takes less than 2 seconds.      Coloration: Skin is not jaundiced.      Findings: No bruising.   Neurological:      General: No focal deficit present.      Mental  Status: She is alert and oriented to person, place, and time. Mental status is at baseline.      Cranial Nerves: No cranial nerve deficit.      Sensory: No sensory deficit.      Motor: No weakness.   Psychiatric:         Mood and Affect: Mood normal.         Behavior: Behavior normal.         Thought Content: Thought content normal.         Judgment: Judgment normal.       Laboratory    Recent Labs   Lab 01/02/23  0610   WBC 2.90*   RBC 3.43*   HGB 10.2*   HCT 32.0*   *   MCV 93   MCH 29.7   MCHC 31.9*         Recent Labs   Lab 01/02/23  0610   CALCIUM 8.2*   PROT 5.5*      K 2.8*   CO2 30*   CL 97   BUN 6*   CREATININE 0.5   ALKPHOS 48*   ALT 15   AST 32   BILITOT 0.8         No results for input(s): PT, INR, APTT in the last 24 hours.    No results for input(s): CPK, CPKMB, TROPONINI, MB in the last 24 hours.      Additional labs: reviewed    Microbiology:       Microbiology Results (last 7 days)       Procedure Component Value Units Date/Time    Urine culture [[id number]] Collected: 12/30/22 2140    Order Status: Completed Specimen: Urine Updated: 01/02/23 0659     Urine Culture, Routine No growth    Narrative:      Specimen Source->Urine    Blood culture x two cultures. Draw prior to antibiotics. [[id number]] Collected: 12/30/22 1940    Order Status: Completed Specimen: Blood from Peripheral, Antecubital, Right Updated: 01/01/23 2032     Blood Culture, Routine No Growth to date      No Growth to date      No Growth to date    Narrative:      Aerobic and anaerobic    Blood culture x two cultures. Draw prior to antibiotics. [[id number]] Collected: 12/30/22 1924    Order Status: Completed Specimen: Blood from Peripheral, Antecubital, Left Updated: 01/01/23 2032     Blood Culture, Routine No Growth to date      No Growth to date      No Growth to date    Narrative:      Aerobic and anaerobic    MRSA Screen by PCR [[id number]] Collected: 12/31/22 0910    Order Status: Completed Specimen: Nasopharyngeal Swab  "from Nasal Updated: 12/31/22 1051     MRSA SCREEN BY PCR Negative            Radiology    X-Ray Chest AP Portable  Portable chest x-ray at 9:09 AM is compared to prior study 12/30/2022    Clinical history is pneumonia    There is progression of the groundglass alveolar opacities in the right lung with masslike alveolar opacity in the right lower lobe. Findings are suggestive of pneumonia. There is fluid within the minor fissure. The left lung is clear. The heart is normal in size. There are no acute osseous abnormalities.    IMPRESSION: Progression of the alveolar opacities within the right lung compatible with worsening pneumonia    Small right pleural effusion    Electronically signed by:  Faby Bazan MD  12/31/2022 9:42 AM CST Workstation: [id number]        Additional Studies    reviewed    Ventilator Information              No results for input(s): PH, PCO2, PO2, HCO3, POCSATURATED, BE in the last 72 hours.      Impression    Active Hospital Problems    Diagnosis  POA    *Pneumonia due to infectious organism [J18.9]  Unknown    Hypoxemia requiring supplemental oxygen [R09.02, Z99.81]  Yes    Acute hypoxemic respiratory failure [J96.01]  Unknown    Essential hypertension [I10]  Yes    Type 2 diabetes mellitus, without long-term current use of insulin [E11.9]  Yes      Resolved Hospital Problems   No resolved problems to display.       Plan    Continue antibiotics  Prn respiratory treatments  Recheck CXR and procalcitonin since she had fever  Consider steroids but she reports an "allergy"  CXR reviewed  Prophylactic lovenox  OOB as able, increase activity  Clinically feels better    Thank you for this consult.  I will follow with you while the patient is hospitalized.        Pedro Viera MD  [facility] Pulmonary/Critical Care  01/02/2023          "

## 2023-01-02 NOTE — PROGRESS NOTES
[facility] Medicine  Progress Note    Patient name: Doris Hoovre  MRN: [id number]  Admit Date: 12/30/2022   LOS: 3 days     SUBJECTIVE:     Principal problem: Pneumonia due to infectious organism    Interval History:  No acute overnight events reported.  Febrile last night with a T-max of 101.3° F. Cough is improved.  Denies further nausea.  Slight improvement in O2 requirements; down to 8 L of oxygen high-flow nasal cannula.    Summary:   62-year-old  female with type 2 DM and essential hypertension presented to the ED with chief complaint of fever and cough of 2 day onset.  Positive for sick contacts.  Found to have right lower lobe pneumonia.  Being treated for acute hypoxic respiratory failure secondary to pneumonia.  MRSA screen negative.    Scheduled Meds:   albuterol-ipratropium  3 mL Nebulization Q6H    enoxparin  40 mg Subcutaneous Q24H    guaiFENesin  600 mg Oral BID    levoFLOXacin  750 mg Intravenous Q24H    pantoprazole  40 mg Oral Daily    piperacillin-tazobactam (ZOSYN) IVPB  4.5 g Intravenous Q8H    senna-docusate 8.6-50 mg  1 tablet Oral BID     Continuous Infusions:      PRN Meds:acetaminophen, albuterol-ipratropium, ALPRAZolam, benzonatate, dextrose 10%, dextrose 10%, glucagon (human recombinant), glucose, glucose, ibuprofen, insulin aspart U-100, magnesium oxide, magnesium oxide, ondansetron, potassium bicarbonate, potassium bicarbonate, potassium bicarbonate, potassium, sodium phosphates, potassium, sodium phosphates, potassium, sodium phosphates    Review of patient's allergies indicates:   Allergen Reactions    Butorphanol tartrate Other (See Comments)     Hallucinations  hallucinations      Methylprednisolone Other (See Comments) and Shortness Of Breath     flushing  flushing         Review of Systems: As per interval history    OBJECTIVE:     Vital Signs (Most Recent)  Temp: 97.8 °F (36.6 °C) (01/02/23 1140)  Pulse: 76 (01/02/23 1244)  Resp: 19 (01/02/23  1244)  BP: 124/65 (01/02/23 1140)  SpO2: 99 % (01/02/23 1244)    Vital Signs Range (Last 24H):  Temp:  [97.7 °F (36.5 °C)-101.3 °F (38.5 °C)]   Pulse:  [70-92]   Resp:  [18-24]   BP: (119-141)/(65-83)   SpO2:  [91 %-99 %]     I & O (Last 24H):  Intake/Output Summary (Last 24 hours) at 1/2/2023 1604  Last data filed at 1/2/2023 0627  Gross per 24 hour   Intake 700 ml   Output 1000 ml   Net -300 ml         Physical Exam:  General:  Ill-appearing.  No acute distress.  Eyes: No conjunctival injection. No scleral icterus.  ENT: Hearing grossly intact. No discharge from ears. No nasal discharge.   Neck: Supple, trachea midline. No JVD  CVS: RRR. No LE edema BL  Lungs: No tachypnea or accessory muscle use.  Coarse rhonchi over the posterior aspect of right lower lobe.  Abdomen:  Soft, nontender and nondistended.  No organomegaly  Neuro: AOx3. Moves all extremities. Follows commands. Responds appropriately   Skin:  No rash or erythema noted    Laboratory:  I have reviewed all pertinent lab results within the past 24 hours.  CBC:   Recent Labs   Lab 01/02/23  0610   WBC 2.90*   RBC 3.43*   HGB 10.2*   HCT 32.0*   *   MCV 93   MCH 29.7   MCHC 31.9*       CMP:   Recent Labs   Lab 01/02/23  0610   *   CALCIUM 8.2*   ALBUMIN 2.7*   PROT 5.5*      K 2.8*   CO2 30*   CL 97   BUN 6*   CREATININE 0.5   ALKPHOS 48*   ALT 15   AST 32   BILITOT 0.8         Diagnostic Results:  Labs: Reviewed    ASSESSMENT/PLAN:       Active Hospital Problems    Diagnosis  POA    *Pneumonia due to infectious organism [J18.9]  Unknown    Hypoxemia requiring supplemental oxygen [R09.02, Z99.81]  Yes    Acute hypoxemic respiratory failure [J96.01]  Unknown    Essential hypertension [I10]  Yes    Type 2 diabetes mellitus, without long-term current use of insulin [E11.9]  Yes      Resolved Hospital Problems   No resolved problems to display.       Plan:   Acute hypoxic respiratory failure due to right lower lobe pneumonia   Chest x-ray  this morning with worsening alveolar opacities involving the right lung   MRSA PCR screen negative  Repeat procalcitonin within normal limits.  Discontinue piperacillin-tazobactam and continue levofloxacin  Follow respiratory and blood cultures - no growth to date   Appreciate pulmonology input   Wean supplemental oxygen as tolerated   Symptomatic care with bronchodilators and antitussives  Discontinue IV fluids.  Continue to hold home antihypertensives    VTE Risk Mitigation (From admission, onward)           Ordered     enoxaparin injection 40 mg  Every 24 hours (non-standard times)         12/30/22 2321     IP VTE LOW RISK PATIENT  Once         12/30/22 2318     Place sequential compression device  Until discontinued         12/30/22 2318                        Department Hospital Medicine  [facility]  Dirk Amaro MD  Date of service: 01/02/2023

## 2023-01-03 LAB
ALBUMIN SERPL BCP-MCNC: 2.9 G/DL (ref 3.5–5.2)
ALP SERPL-CCNC: 53 U/L (ref 55–135)
ALT SERPL W/O P-5'-P-CCNC: 19 U/L (ref 10–44)
ANION GAP SERPL CALC-SCNC: 8 MMOL/L (ref 8–16)
AST SERPL-CCNC: 39 U/L (ref 10–40)
BILIRUB SERPL-MCNC: 0.7 MG/DL (ref 0.1–1)
BUN SERPL-MCNC: 7 MG/DL (ref 8–23)
CALCIUM SERPL-MCNC: 8.7 MG/DL (ref 8.7–10.5)
CHLORIDE SERPL-SCNC: 97 MMOL/L (ref 95–110)
CO2 SERPL-SCNC: 34 MMOL/L (ref 23–29)
CREAT SERPL-MCNC: 0.6 MG/DL (ref 0.5–1.4)
ERYTHROCYTE [DISTWIDTH] IN BLOOD BY AUTOMATED COUNT: 13.2 % (ref 11.5–14.5)
EST. GFR  (NO RACE VARIABLE): >60 ML/MIN/1.73 M^2
GLUCOSE SERPL-MCNC: 111 MG/DL (ref 70–110)
GLUCOSE SERPL-MCNC: 131 MG/DL (ref 70–110)
GLUCOSE SERPL-MCNC: 171 MG/DL (ref 70–110)
GLUCOSE SERPL-MCNC: 185 MG/DL (ref 70–110)
GLUCOSE SERPL-MCNC: 241 MG/DL (ref 70–110)
HCT VFR BLD AUTO: 35.1 % (ref 37–48.5)
HGB BLD-MCNC: 11 G/DL (ref 12–16)
MCH RBC QN AUTO: 29.9 PG (ref 27–31)
MCHC RBC AUTO-ENTMCNC: 31.3 G/DL (ref 32–36)
MCV RBC AUTO: 95 FL (ref 82–98)
PLATELET # BLD AUTO: 190 K/UL (ref 150–450)
PMV BLD AUTO: 10.6 FL (ref 9.2–12.9)
POTASSIUM SERPL-SCNC: 3.7 MMOL/L (ref 3.5–5.1)
PROT SERPL-MCNC: 6 G/DL (ref 6–8.4)
RBC # BLD AUTO: 3.68 M/UL (ref 4–5.4)
SODIUM SERPL-SCNC: 139 MMOL/L (ref 136–145)
WBC # BLD AUTO: 3.37 K/UL (ref 3.9–12.7)

## 2023-01-03 PROCEDURE — 25000003 PHARM REV CODE 250: Performed by: INTERNAL MEDICINE

## 2023-01-03 PROCEDURE — 99232 SBSQ HOSP IP/OBS MODERATE 35: CPT | Mod: ,,, | Performed by: INTERNAL MEDICINE

## 2023-01-03 PROCEDURE — 80053 COMPREHEN METABOLIC PANEL: CPT | Performed by: INTERNAL MEDICINE

## 2023-01-03 PROCEDURE — 94761 N-INVAS EAR/PLS OXIMETRY MLT: CPT

## 2023-01-03 PROCEDURE — 36415 COLL VENOUS BLD VENIPUNCTURE: CPT | Performed by: INTERNAL MEDICINE

## 2023-01-03 PROCEDURE — 94640 AIRWAY INHALATION TREATMENT: CPT

## 2023-01-03 PROCEDURE — 25000242 PHARM REV CODE 250 ALT 637 W/ HCPCS: Performed by: INTERNAL MEDICINE

## 2023-01-03 PROCEDURE — 85027 COMPLETE CBC AUTOMATED: CPT | Performed by: INTERNAL MEDICINE

## 2023-01-03 PROCEDURE — 99232 PR SUBSEQUENT HOSPITAL CARE,LEVL II: ICD-10-PCS | Mod: ,,, | Performed by: INTERNAL MEDICINE

## 2023-01-03 PROCEDURE — 12000002 HC ACUTE/MED SURGE SEMI-PRIVATE ROOM

## 2023-01-03 PROCEDURE — 63600175 PHARM REV CODE 636 W HCPCS: Performed by: INTERNAL MEDICINE

## 2023-01-03 PROCEDURE — 27000221 HC OXYGEN, UP TO 24 HOURS

## 2023-01-03 RX ADMIN — INSULIN ASPART 2 UNITS: 100 INJECTION, SOLUTION INTRAVENOUS; SUBCUTANEOUS at 05:01

## 2023-01-03 RX ADMIN — IPRATROPIUM BROMIDE AND ALBUTEROL SULFATE 3 ML: .5; 3 SOLUTION RESPIRATORY (INHALATION) at 08:01

## 2023-01-03 RX ADMIN — PANTOPRAZOLE SODIUM 40 MG: 40 TABLET, DELAYED RELEASE ORAL at 06:01

## 2023-01-03 RX ADMIN — IBUPROFEN 600 MG: 200 TABLET, FILM COATED ORAL at 08:01

## 2023-01-03 RX ADMIN — ALPRAZOLAM 0.5 MG: 0.5 TABLET ORAL at 07:01

## 2023-01-03 RX ADMIN — ENOXAPARIN SODIUM 40 MG: 100 INJECTION SUBCUTANEOUS at 06:01

## 2023-01-03 RX ADMIN — LEVOFLOXACIN 750 MG: 5 INJECTION, SOLUTION INTRAVENOUS at 06:01

## 2023-01-03 RX ADMIN — ALPRAZOLAM 0.5 MG: 0.5 TABLET ORAL at 08:01

## 2023-01-03 RX ADMIN — INSULIN ASPART 4 UNITS: 100 INJECTION, SOLUTION INTRAVENOUS; SUBCUTANEOUS at 12:01

## 2023-01-03 RX ADMIN — GUAIFENESIN 600 MG: 600 TABLET, EXTENDED RELEASE ORAL at 10:01

## 2023-01-03 RX ADMIN — BENZONATATE 100 MG: 100 CAPSULE ORAL at 06:01

## 2023-01-03 RX ADMIN — GUAIFENESIN 600 MG: 600 TABLET, EXTENDED RELEASE ORAL at 08:01

## 2023-01-03 RX ADMIN — BENZONATATE 100 MG: 100 CAPSULE ORAL at 08:01

## 2023-01-03 RX ADMIN — IPRATROPIUM BROMIDE AND ALBUTEROL SULFATE 3 ML: .5; 3 SOLUTION RESPIRATORY (INHALATION) at 03:01

## 2023-01-03 RX ADMIN — IPRATROPIUM BROMIDE AND ALBUTEROL SULFATE 3 ML: .5; 3 SOLUTION RESPIRATORY (INHALATION) at 01:01

## 2023-01-03 RX ADMIN — IBUPROFEN 600 MG: 200 TABLET, FILM COATED ORAL at 06:01

## 2023-01-03 NOTE — CARE UPDATE
01/03/23 0833   Patient Assessment/Suction   Level of Consciousness (AVPU) alert   Respiratory Effort Unlabored   Expansion/Accessory Muscles/Retractions no use of accessory muscles   All Lung Fields Breath Sounds diminished   Rhythm/Pattern, Respiratory unlabored   Cough Frequency infrequent   Cough Type congested   PRE-TX-O2   Device (Oxygen Therapy) high flow nasal cannula   Flow (L/min) 9   SpO2 (!) 92 %   Pulse Oximetry Type Continuous   $ Pulse Oximetry - Multiple Charge Pulse Oximetry - Multiple   Pulse 74   Resp 18   Positioning   Body Position position changed independently   Head of Bed (HOB) Positioning HOB at 45 degrees   Positioning/Transfer Devices pillows;in use   Aerosol Therapy   $ Aerosol Therapy Charges Aerosol Treatment   Daily Review of Necessity (SVN) completed   Respiratory Treatment Status (SVN) given   Treatment Route (SVN) mask;oxygen   Patient Position (SVN) HOB elevated   Post Treatment Assessment (SVN) increased aeration   Signs of Intolerance (SVN) none

## 2023-01-03 NOTE — RESPIRATORY THERAPY
01/02/23 2051   Patient Assessment/Suction   Level of Consciousness (AVPU) alert   Respiratory Effort Normal;Unlabored   Expansion/Accessory Muscles/Retractions no use of accessory muscles   All Lung Fields Breath Sounds equal bilaterally;diminished;coarse   CHA Breath Sounds wheezes, expiratory   Rhythm/Pattern, Respiratory unlabored   Cough Frequency frequent   Cough Type fair;loose;nonproductive   PRE-TX-O2   Device (Oxygen Therapy) nasal cannula with humidification   Flow (L/min) 9   SpO2 100 %   Pulse Oximetry Type Continuous   $ Pulse Oximetry - Multiple Charge Pulse Oximetry - Multiple   Pulse 84   Resp 18   Aerosol Therapy   $ Aerosol Therapy Charges Aerosol Treatment   Daily Review of Necessity (SVN) completed   Respiratory Treatment Status (SVN) given   Treatment Route (SVN) mouthpiece   Patient Position (SVN) HOB elevated   Post Treatment Assessment (SVN) breath sounds unchanged   Signs of Intolerance (SVN) none   Breath Sounds Post-Respiratory Treatment   Post-treatment Heart Rate (beats/min) 82   Post-treatment Resp Rate (breaths/min) 18   Education   $ Education Bronchodilator;15 min   Respiratory Evaluation   $ Care Plan Tech Time 15 min   $ Eval/Re-eval Charges Re-evaluation   Evaluation For Re-Eval 3 day

## 2023-01-03 NOTE — PROGRESS NOTES
[facility] Medicine  Progress Note    Patient name: Doris Hoover  MRN: [id number]  Admit Date: 12/30/2022   LOS: 4 days     SUBJECTIVE:     Principal problem: Pneumonia due to infectious organism    Interval History:  No acute overnight events reported. Afebrile overnight. O2 sats improving; down to 3L     Summary:   62-year-old  female with type 2 DM and essential hypertension presented to the ED with chief complaint of fever and cough of 2 day onset.  Positive for sick contacts.  Found to have right lower lobe pneumonia.  Being treated for acute hypoxic respiratory failure secondary to pneumonia.  MRSA screen negative.    Scheduled Meds:   albuterol-ipratropium  3 mL Nebulization Q6H    enoxparin  40 mg Subcutaneous Q24H    guaiFENesin  600 mg Oral BID    levoFLOXacin  750 mg Intravenous Q24H    pantoprazole  40 mg Oral Daily    senna-docusate 8.6-50 mg  1 tablet Oral BID     Continuous Infusions:      PRN Meds:acetaminophen, albuterol-ipratropium, ALPRAZolam, benzonatate, dextrose 10%, dextrose 10%, glucagon (human recombinant), glucose, glucose, ibuprofen, insulin aspart U-100, magnesium oxide, magnesium oxide, ondansetron, potassium bicarbonate, potassium bicarbonate, potassium bicarbonate, potassium, sodium phosphates, potassium, sodium phosphates, potassium, sodium phosphates    Review of patient's allergies indicates:   Allergen Reactions    Butorphanol tartrate Other (See Comments)     Hallucinations  hallucinations      Methylprednisolone Other (See Comments) and Shortness Of Breath     flushing  flushing         Review of Systems: As per interval history    OBJECTIVE:     Vital Signs (Most Recent)  Temp: 97.5 °F (36.4 °C) (01/03/23 1131)  Pulse: 72 (01/03/23 1505)  Resp: 18 (01/03/23 1505)  BP: 121/71 (01/03/23 1131)  SpO2: (!) 94 % (01/03/23 1505)    Vital Signs Range (Last 24H):  Temp:  [97.5 °F (36.4 °C)-99.2 °F (37.3 °C)]   Pulse:  [71-86]   Resp:  [16-18]   BP:  (111-142)/(67-81)   SpO2:  [92 %-100 %]     I & O (Last 24H):  Intake/Output Summary (Last 24 hours) at 1/3/2023 1517  Last data filed at 1/3/2023 1132  Gross per 24 hour   Intake 1030 ml   Output --   Net 1030 ml         Physical Exam:  General:  Ill-appearing.  No acute distress.  Eyes: No conjunctival injection. No scleral icterus.  ENT: Hearing grossly intact. No discharge from ears. No nasal discharge.   Neck: Supple, trachea midline. No JVD  CVS: RRR. No LE edema BL  Lungs: No tachypnea or accessory muscle use.  Coarse rhonchi over the posterior aspect of right lower lobe.  Abdomen:  Soft, nontender and nondistended.  No organomegaly  Neuro: AOx3. Moves all extremities. Follows commands. Responds appropriately   Skin:  No rash or erythema noted    Laboratory:  I have reviewed all pertinent lab results within the past 24 hours.  CBC:   Recent Labs   Lab 01/03/23  0633   WBC 3.37*   RBC 3.68*   HGB 11.0*   HCT 35.1*      MCV 95   MCH 29.9   MCHC 31.3*       CMP:   Recent Labs   Lab 01/03/23  0633   *   CALCIUM 8.7   ALBUMIN 2.9*   PROT 6.0      K 3.7   CO2 34*   CL 97   BUN 7*   CREATININE 0.6   ALKPHOS 53*   ALT 19   AST 39   BILITOT 0.7         Diagnostic Results:  Labs: Reviewed    ASSESSMENT/PLAN:       Active Hospital Problems    Diagnosis  POA    *Pneumonia due to infectious organism [J18.9]  Unknown    Hypoxemia requiring supplemental oxygen [R09.02, Z99.81]  Yes    Acute hypoxemic respiratory failure [J96.01]  Unknown    Essential hypertension [I10]  Yes    Type 2 diabetes mellitus, without long-term current use of insulin [E11.9]  Yes      Resolved Hospital Problems   No resolved problems to display.       Plan:   Acute hypoxic respiratory failure due to right lower lobe pneumonia   MRSA PCR screen negative  Repeat procalcitonin within normal limits.  Discontinue piperacillin-tazobactam and continue levofloxacin  Follow respiratory and blood cultures - no growth to date   Appreciate  pulmonology input   Wean supplemental oxygen as tolerated   Symptomatic care with bronchodilators and antitussives  Continue to hold home antihypertensives    VTE Risk Mitigation (From admission, onward)           Ordered     enoxaparin injection 40 mg  Every 24 hours (non-standard times)         12/30/22 2321     IP VTE LOW RISK PATIENT  Once         12/30/22 2318     Place sequential compression device  Until discontinued         12/30/22 2318                        Department Hospital Medicine  [facility]  Dirk Amaro MD  Date of service: 01/03/2023

## 2023-01-04 LAB
ANION GAP SERPL CALC-SCNC: 6 MMOL/L (ref 8–16)
BACTERIA BLD CULT: NORMAL
BACTERIA BLD CULT: NORMAL
BUN SERPL-MCNC: 12 MG/DL (ref 8–23)
CALCIUM SERPL-MCNC: 8.4 MG/DL (ref 8.7–10.5)
CHLORIDE SERPL-SCNC: 102 MMOL/L (ref 95–110)
CO2 SERPL-SCNC: 31 MMOL/L (ref 23–29)
CREAT SERPL-MCNC: 0.6 MG/DL (ref 0.5–1.4)
ERYTHROCYTE [DISTWIDTH] IN BLOOD BY AUTOMATED COUNT: 13.2 % (ref 11.5–14.5)
EST. GFR  (NO RACE VARIABLE): >60 ML/MIN/1.73 M^2
GLUCOSE SERPL-MCNC: 148 MG/DL (ref 70–110)
GLUCOSE SERPL-MCNC: 157 MG/DL (ref 70–110)
GLUCOSE SERPL-MCNC: 164 MG/DL (ref 70–110)
GLUCOSE SERPL-MCNC: 166 MG/DL (ref 70–110)
GLUCOSE SERPL-MCNC: 173 MG/DL (ref 70–110)
HCT VFR BLD AUTO: 34 % (ref 37–48.5)
HGB BLD-MCNC: 10.4 G/DL (ref 12–16)
MCH RBC QN AUTO: 29.2 PG (ref 27–31)
MCHC RBC AUTO-ENTMCNC: 30.6 G/DL (ref 32–36)
MCV RBC AUTO: 96 FL (ref 82–98)
PLATELET # BLD AUTO: 207 K/UL (ref 150–450)
PMV BLD AUTO: 10.3 FL (ref 9.2–12.9)
POTASSIUM SERPL-SCNC: 4 MMOL/L (ref 3.5–5.1)
RBC # BLD AUTO: 3.56 M/UL (ref 4–5.4)
SODIUM SERPL-SCNC: 139 MMOL/L (ref 136–145)
WBC # BLD AUTO: 3.1 K/UL (ref 3.9–12.7)

## 2023-01-04 PROCEDURE — 25000003 PHARM REV CODE 250: Performed by: INTERNAL MEDICINE

## 2023-01-04 PROCEDURE — 97162 PT EVAL MOD COMPLEX 30 MIN: CPT

## 2023-01-04 PROCEDURE — 94618 PULMONARY STRESS TESTING: CPT

## 2023-01-04 PROCEDURE — 12000002 HC ACUTE/MED SURGE SEMI-PRIVATE ROOM

## 2023-01-04 PROCEDURE — 25000242 PHARM REV CODE 250 ALT 637 W/ HCPCS: Performed by: INTERNAL MEDICINE

## 2023-01-04 PROCEDURE — 80048 BASIC METABOLIC PNL TOTAL CA: CPT | Performed by: INTERNAL MEDICINE

## 2023-01-04 PROCEDURE — 27000221 HC OXYGEN, UP TO 24 HOURS

## 2023-01-04 PROCEDURE — 63600175 PHARM REV CODE 636 W HCPCS: Performed by: INTERNAL MEDICINE

## 2023-01-04 PROCEDURE — 36415 COLL VENOUS BLD VENIPUNCTURE: CPT | Performed by: INTERNAL MEDICINE

## 2023-01-04 PROCEDURE — 97530 THERAPEUTIC ACTIVITIES: CPT

## 2023-01-04 PROCEDURE — 85027 COMPLETE CBC AUTOMATED: CPT | Performed by: INTERNAL MEDICINE

## 2023-01-04 PROCEDURE — 94760 N-INVAS EAR/PLS OXIMETRY 1: CPT

## 2023-01-04 PROCEDURE — 99900035 HC TECH TIME PER 15 MIN (STAT)

## 2023-01-04 PROCEDURE — 94799 UNLISTED PULMONARY SVC/PX: CPT

## 2023-01-04 PROCEDURE — 99900031 HC PATIENT EDUCATION (STAT)

## 2023-01-04 PROCEDURE — 94761 N-INVAS EAR/PLS OXIMETRY MLT: CPT

## 2023-01-04 PROCEDURE — 94640 AIRWAY INHALATION TREATMENT: CPT

## 2023-01-04 RX ADMIN — GUAIFENESIN 600 MG: 600 TABLET, EXTENDED RELEASE ORAL at 09:01

## 2023-01-04 RX ADMIN — IPRATROPIUM BROMIDE AND ALBUTEROL SULFATE 3 ML: .5; 3 SOLUTION RESPIRATORY (INHALATION) at 02:01

## 2023-01-04 RX ADMIN — IPRATROPIUM BROMIDE AND ALBUTEROL SULFATE 3 ML: .5; 3 SOLUTION RESPIRATORY (INHALATION) at 07:01

## 2023-01-04 RX ADMIN — IPRATROPIUM BROMIDE AND ALBUTEROL SULFATE 3 ML: .5; 3 SOLUTION RESPIRATORY (INHALATION) at 01:01

## 2023-01-04 RX ADMIN — GUAIFENESIN 600 MG: 600 TABLET, EXTENDED RELEASE ORAL at 08:01

## 2023-01-04 RX ADMIN — ALPRAZOLAM 0.5 MG: 0.5 TABLET ORAL at 08:01

## 2023-01-04 RX ADMIN — LEVOFLOXACIN 750 MG: 5 INJECTION, SOLUTION INTRAVENOUS at 05:01

## 2023-01-04 RX ADMIN — PANTOPRAZOLE SODIUM 40 MG: 40 TABLET, DELAYED RELEASE ORAL at 05:01

## 2023-01-04 RX ADMIN — BENZONATATE 100 MG: 100 CAPSULE ORAL at 06:01

## 2023-01-04 RX ADMIN — IPRATROPIUM BROMIDE AND ALBUTEROL SULFATE 3 ML: .5; 3 SOLUTION RESPIRATORY (INHALATION) at 08:01

## 2023-01-04 RX ADMIN — BENZONATATE 100 MG: 100 CAPSULE ORAL at 01:01

## 2023-01-04 NOTE — PLAN OF CARE
Problem: Physical Therapy  Goal: Physical Therapy Goal  Description: Goals to be met by: 23     Patient will increase functional independence with mobility by performin. Sit to stand transfer with Modified [place]  2. Bed to chair transfer with Modified [place] using No Assistive Device  3. Gait  x 200 feet with Modified [place] using No Assistive Device maintaining O2 sat above 92% on RA.     Outcome: POC established; Ongoing, Progressing

## 2023-01-04 NOTE — RESPIRATORY THERAPY
01/04/23 1140   Home Oxygen Qualification   $ Home O2 Qualification Pulmonary Stress Test/6 min walk;Tech time 15 minutes   Room Air SpO2 At Rest 93 %   Room Air SpO2 During Ambulation (!) 85 %   SpO2 During Ambulation on O2 93 %   Heart Rate on O2 70 bpm   Ambulation O2 LPM 2 LPM   SpO2 Post Ambulation 93 %   Post Ambulation Heart Rate 74 bpm   Post Ambulation O2 LPM 2 LPM   Home O2 Eval Comments patient's started walking on room air. patient's sats dropped to 85%. placed 2L NC on patient. patient maintained at 93% on 2L NC

## 2023-01-04 NOTE — PLAN OF CARE
Problem: Oral Intake Inadequate  Goal: Improved Oral Intake  Outcome: Ongoing, Progressing  Intervention: Promote and Optimize Oral Intake  Flowsheets (Taken 1/4/2023 0568)  Oral Nutrition Promotion:   calorie-dense foods provided   other (see comments)     Recommendations  1.) Continue diabetic diet.   2.) If PO intakes <50% of meals, add Glucerna BID.   3.) Suggest HbA1C update.   4.) Suggest MVI for general health.     Goals:   1.) Pt to consume/tolerate >75% of meals.   2.) BG stabilization during admission.  Nutrition Goal Status: new

## 2023-01-04 NOTE — CARE UPDATE
01/04/23 0837   Patient Assessment/Suction   Level of Consciousness (AVPU) alert   Respiratory Effort Unlabored   Expansion/Accessory Muscles/Retractions no use of accessory muscles;no retractions   All Lung Fields Breath Sounds coarse;diminished   CHA Breath Sounds coarse   LLL Breath Sounds diminished   RUL Breath Sounds coarse   RML Breath Sounds coarse   RLL Breath Sounds diminished   Rhythm/Pattern, Respiratory pattern regular;depth regular   Cough Frequency frequent   Cough Type congested   PRE-TX-O2   Device (Oxygen Therapy) high flow nasal cannula   $ Is the patient on Low Flow Oxygen? Yes   Flow (L/min) 3   SpO2 96 %   Pulse Oximetry Type Continuous   $ Pulse Oximetry - Single Charge Pulse Oximetry - Single   SpO2 Alarm Limit Low 100   SpO2 Alarm Limit High 88   Probe Placed On (Pulse Ox) Left:;finger   Oximetry Probe Site No Change Needed   Pulse 77   Resp 18   Aerosol Therapy   $ Aerosol Therapy Charges Aerosol Treatment   Daily Review of Necessity (SVN) completed   Respiratory Treatment Status (SVN) given   Treatment Route (SVN) mouthpiece;oxygen   Patient Position (SVN) HOB elevated   Post Treatment Assessment (SVN) increased aeration   Signs of Intolerance (SVN) none   Education   $ Education 15 min;Bronchodilator   Respiratory Evaluation   $ Care Plan Tech Time 15 min   $ Eval/Re-eval Charges Evaluation   Evaluation For   (care plan)

## 2023-01-04 NOTE — PROGRESS NOTES
[facility] Medicine  Progress Note    Patient name: Doris Hoover  MRN: [id number]  Admit Date: 12/30/2022   LOS: 5 days     SUBJECTIVE:     Principal problem: Pneumonia due to infectious organism    Interval History:  No acute overnight events reported.  Continues to have improvement in cough and shortness of breath.  Home O2 eval complete; will need 3 L of oxygen with ambulation.    Summary:   62-year-old  female with type 2 DM and essential hypertension presented to the ED with chief complaint of fever and cough of 2 day onset.  Positive for sick contacts.  Found to have right lower lobe pneumonia.  Being treated for acute hypoxic respiratory failure secondary to pneumonia.  MRSA screen negative.    Scheduled Meds:   albuterol-ipratropium  3 mL Nebulization Q6H    enoxparin  40 mg Subcutaneous Q24H    guaiFENesin  600 mg Oral BID    levoFLOXacin  750 mg Intravenous Q24H    pantoprazole  40 mg Oral Daily    senna-docusate 8.6-50 mg  1 tablet Oral BID     Continuous Infusions:      PRN Meds:acetaminophen, albuterol-ipratropium, ALPRAZolam, benzonatate, dextrose 10%, dextrose 10%, glucagon (human recombinant), glucose, glucose, ibuprofen, insulin aspart U-100, magnesium oxide, magnesium oxide, ondansetron, potassium bicarbonate, potassium bicarbonate, potassium bicarbonate, potassium, sodium phosphates, potassium, sodium phosphates, potassium, sodium phosphates    Review of patient's allergies indicates:   Allergen Reactions    Butorphanol tartrate Other (See Comments)     Hallucinations  hallucinations      Methylprednisolone Other (See Comments) and Shortness Of Breath     flushing  flushing         Review of Systems: As per interval history    OBJECTIVE:     Vital Signs (Most Recent)  Temp: 97.6 °F (36.4 °C) (01/04/23 1533)  Pulse: 76 (01/04/23 1533)  Resp: 18 (01/04/23 1533)  BP: 130/80 (01/04/23 1533)  SpO2: 95 % (01/04/23 1533)    Vital Signs Range (Last 24H):  Temp:  [97.6 °F  (36.4 °C)-98.6 °F (37 °C)]   Pulse:  [67-89]   Resp:  [16-20]   BP: (113-130)/(62-86)   SpO2:  [93 %-96 %]     I & O (Last 24H):  Intake/Output Summary (Last 24 hours) at 1/4/2023 1620  Last data filed at 1/4/2023 1541  Gross per 24 hour   Intake 900 ml   Output 880 ml   Net 20 ml         Physical Exam:  General:  Ill-appearing.  No acute distress.  Eyes: No conjunctival injection. No scleral icterus.  ENT: Hearing grossly intact. No discharge from ears. No nasal discharge.   Neck: Supple, trachea midline. No JVD  CVS: RRR. No LE edema BL  Lungs: No tachypnea or accessory muscle use.  Coarse rhonchi over the posterior aspect of right lower lobe.  Abdomen:  Soft, nontender and nondistended.  No organomegaly  Neuro: AOx3. Moves all extremities. Follows commands. Responds appropriately   Skin:  No rash or erythema noted    Laboratory:  I have reviewed all pertinent lab results within the past 24 hours.  CBC:   Recent Labs   Lab 01/04/23  0621   WBC 3.10*   RBC 3.56*   HGB 10.4*   HCT 34.0*      MCV 96   MCH 29.2   MCHC 30.6*       CMP:   Recent Labs   Lab 01/03/23  0633 01/04/23  0621   * 157*   CALCIUM 8.7 8.4*   ALBUMIN 2.9*  --    PROT 6.0  --     139   K 3.7 4.0   CO2 34* 31*   CL 97 102   BUN 7* 12   CREATININE 0.6 0.6   ALKPHOS 53*  --    ALT 19  --    AST 39  --    BILITOT 0.7  --          Diagnostic Results:  Labs: Reviewed    ASSESSMENT/PLAN:       Active Hospital Problems    Diagnosis  POA    *Pneumonia due to infectious organism [J18.9]  Unknown    Hypoxemia requiring supplemental oxygen [R09.02, Z99.81]  Yes    Acute hypoxemic respiratory failure [J96.01]  Unknown    Essential hypertension [I10]  Yes    Type 2 diabetes mellitus, without long-term current use of insulin [E11.9]  Yes      Resolved Hospital Problems   No resolved problems to display.       Plan:   Acute hypoxic respiratory failure due to right lower lobe pneumonia   MRSA PCR screen negative  Repeat procalcitonin within  normal limits.  Discontinue piperacillin-tazobactam and continue levofloxacin  Follow respiratory and blood cultures - no growth to date   Appreciate pulmonology input   Wean supplemental oxygen as tolerated   Symptomatic care with bronchodilators and antitussives  Continue to hold home antihypertensives  Home O2 eval complete; needs 3 L of oxygen with ambulation.    VTE Risk Mitigation (From admission, onward)           Ordered     enoxaparin injection 40 mg  Every 24 hours (non-standard times)         12/30/22 2321     IP VTE LOW RISK PATIENT  Once         12/30/22 2318     Place sequential compression device  Until discontinued         12/30/22 2318                        Department Hospital Medicine  [facility]  Dirk Amaro MD  Date of service: 01/04/2023

## 2023-01-04 NOTE — PT/OT/SLP EVAL
Physical Therapy Evaluation    Patient Name:  Doris Hoover   MRN:  [id number]    Recommendations:     Discharge Recommendations: home   Discharge Equipment Recommendations: walker, rolling   Barriers to discharge: None    Assessment:     Doris Hoover is a 62 y.o. female admitted with a medical diagnosis of Pneumonia due to infectious organism.  She presents with the following impairments/functional limitations: weakness, impaired functional mobility, impaired cardiopulmonary response to activity, impaired endurance, pain Pt found awake in bed. Daughter present and offering support. Agreeable to PT. Pt  educated in deep breathing, pacing self, energy conservation and benefits of mobility. She ambulated 2 x 50 ft with RW CGA, very slow beatriz, steady, on room air maintaining O2 sat 91-94%.This was reported to RT.Pt will benefit from continued mobilization to maximize safety and functional independence.    Rehab Prognosis: Good; patient would benefit from acute skilled PT services to address these deficits and reach maximum level of function.    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  Equipment used at home: nebulizer.  DME owned (not currently used): none.  Upon discharge, patient will have  assistance from family.    Objective:     Communicated with nurse prior to session.  Patient found HOB elevated with peripheral IV, oxygen, pulse ox (continuous), telemetry  upon PT entry to room.    General Precautions: Standard, fall, diabetic  Orthopedic Precautions:N/A   Braces: N/A  Respiratory Status: Nasal cannula, flow 3 L/min    Exams:  Cognitive Exam:  Patient is oriented to Person, Place, Time, and Situation  Gross Motor Coordination:  WFL  RUE Strength: WFL  LUE Strength: WFL  RLE Strength: WFL  LLE Strength: WFL    Functional Mobility:  Bed Mobility:     Supine to Sit: supervision  Transfers:     Sit to Stand:  stand by assistance with rolling walker  Gait: amb 2 x 50 ft with RW CGA, slow beatriz, steady      AM-PAC 6 CLICK MOBILITY  Total Score:21       Treatment & Education:  Bed mobility, transfers, gait on RA with RW    Patient left up in chair with all lines intact, call button in reach, and daughter and RT present.    GOALS:   Multidisciplinary Problems       Physical Therapy Goals          Problem: Physical Therapy    Goal Priority Disciplines Outcome Goal Variances Interventions   Physical Therapy Goal     PT, PT/OT Ongoing, Progressing     Description: Goals to be met by: 23     Patient will increase functional independence with mobility by performin. Sit to stand transfer with Modified [place]  2. Bed to chair transfer with Modified [place] using No Assistive Device  3. Gait  x 200 feet with Modified [place] using No Assistive Device maintaining O2 sat above 92% on RA.                          History:     Past Medical History:   Diagnosis Date    Allergy     Diabetes mellitus, type 2     Hypertension        Past Surgical History:   Procedure Laterality Date     SECTION      3    HYSTERECTOMY      TONSILLECTOMY         Time Tracking:     PT Received On: 23  PT Start Time: 1106     PT Stop Time: 1128  PT Total Time (min): 22 min     Billable Minutes:  Evaluation 10 and Therapeutic Activity 12      01/04/2023

## 2023-01-04 NOTE — PROGRESS NOTES
"[facility]  Adult Nutrition   Progress Note (Initial Assessment)     SUMMARY     Recommendations  1.) Continue diabetic diet.   2.) If PO intakes <50% of meals, add Glucerna BID.   3.) Suggest HbA1C update.   4.) Suggest MVI for general health.    Goals:   1.) Pt to consume/tolerate >75% of meals.   2.) BG stabilization during admission.  Nutrition Goal Status: new    Dietitian Rounds Brief  Pt admitted with acute pneumonia and associated respiratory failure. Pt with fair appetite and intake. Pt reports consuming ~50% of meals. She denies the need for ONS at this time. PTA, good intake of meals. No chew/swallowing issues. No GI distress. LBM 1/2. Encouraged PO intake. Pt denies any recent wt changes. NFPE completed with no s/s of wasting.    Diet order:   Current Diet Order: diabetic, 2000 kcal      Evaluation of Received Nutrient/Fluid Intake  Energy Calories Required: meeting needs  Protein Required: meeting needs  Fluid Required: meeting needs  Tolerance: tolerating     % Intake of Estimated Energy Needs: 50 - 75 %  % Meal Intake: 50 - 75 %      Intake/Output Summary (Last 24 hours) at 1/4/2023 1544  Last data filed at 1/4/2023 1541  Gross per 24 hour   Intake 2100 ml   Output 1780 ml   Net 320 ml        Anthropometrics  Temp: 97.6 °F (36.4 °C)  Height Method: Stated  Height: 5' 6" (167.6 cm)  Height (inches): 66 in  Weight Method: Bed Scale  Weight: 73.9 kg (162 lb 14.7 oz)  Weight (lb): 162.92 lb  Ideal Body Weight (IBW), Female: 130 lb  % Ideal Body Weight, Female (lb): 125.32 %  BMI (Calculated): 26.3  BMI Grade: 25 - 29.9 - overweight       Estimated/Assessed Needs  Weight Used For Calorie Calculations: 73.9 kg (162 lb 14.7 oz)  Energy Calorie Requirements (kcal): 0446-0296  Energy Need Method: Kcal/kg (25-30)  Protein Requirements: 59-74  Weight Used For Protein Calculations: 73.9 kg (162 lb 14.7 oz) (0.8-1.0)  Fluid Requirements (mL): 6205-5943     RDA Method (mL): 1848  CHO Requirement: " 45-60gm CHO per meal    Reason for Assessment  Reason For Assessment: length of stay  Diagnosis: pulmonary disease  Relevant Medical History: DM2    Nutrition/Diet History  Spiritual, Cultural Beliefs, Mu-ism Practices, Values that Affect Care: no  Food Allergies: NKFA  Factors Affecting Nutritional Intake: None identified at this time    Nutrition Risk Screen  Nutrition Risk Screen: no indicators present     MST Score: 0  Have you recently lost weight without trying?: No  Weight loss score: 0  Have you been eating poorly because of a decreased appetite?: No  Appetite score: 0       Weight History:  Wt Readings from Last 5 Encounters:   12/31/22 73.9 kg (162 lb 14.7 oz)   10/10/21 79.4 kg (175 lb)   08/15/21 79.4 kg (175 lb)   06/03/21 79.4 kg (175 lb)   06/12/19 81.6 kg (180 lb)        Lab/Procedures/Meds: Pertinent Labs/Meds Reviewed    Medications:Pertinent Medications Reviewed  Scheduled Meds:   albuterol-ipratropium  3 mL Nebulization Q6H    enoxparin  40 mg Subcutaneous Q24H    guaiFENesin  600 mg Oral BID    levoFLOXacin  750 mg Intravenous Q24H    pantoprazole  40 mg Oral Daily    senna-docusate 8.6-50 mg  1 tablet Oral BID     Continuous Infusions:  PRN Meds:.acetaminophen, albuterol-ipratropium, ALPRAZolam, benzonatate, dextrose 10%, dextrose 10%, glucagon (human recombinant), glucose, glucose, ibuprofen, insulin aspart U-100, magnesium oxide, magnesium oxide, ondansetron, potassium bicarbonate, potassium bicarbonate, potassium bicarbonate, potassium, sodium phosphates, potassium, sodium phosphates, potassium, sodium phosphates    Labs: Pertinent Labs Reviewed  Clinical Chemistry:  Recent Labs   Lab 12/30/22  1924 12/31/22  0711 01/03/23  0633 01/04/23  0621   *  132*   < > 139 139   K 3.4*  3.4*   < > 3.7 4.0   CL 97  97   < > 97 102   CO2 26  26   < > 34* 31*   *  139*   < > 111* 157*   BUN 15  15   < > 7* 12   CREATININE 0.8  0.8   < > 0.6 0.6   CALCIUM 8.4*  8.4*   < > 8.7 8.4*    PROT 7.0  7.0   < > 6.0  --    ALBUMIN 3.6  3.6   < > 2.9*  --    BILITOT 1.0  1.0   < > 0.7  --    ALKPHOS 65  65   < > 53*  --    AST 27  27   < > 39  --    ALT 16  16   < > 19  --    ANIONGAP 9  9   < > 8 6*   MG 1.8  --   --   --    PHOS 3.1  --   --   --    LIPASE 28  --   --   --     < > = values in this interval not displayed.     CBC:   Recent Labs   Lab 01/04/23  0621   WBC 3.10*   RBC 3.56*   HGB 10.4*   HCT 34.0*      MCV 96   MCH 29.2   MCHC 30.6*     Cardiac Profile:  Recent Labs   Lab 12/30/22 1924   *   CPK 84     Thyroid & Parathyroid:  Recent Labs   Lab 12/30/22 1924   TSH 1.180       Monitor and Evaluation  Food and Nutrient Intake: food and beverage intake, energy intake  Food and Nutrient Adminstration: diet order  Knowledge/Beliefs/Attitudes: food and nutrition knowledge/skill  Physical Activity and Function: nutrition-related ADLs and IADLs  Anthropometric Measurements: weight, weight change  Biochemical Data, Medical Tests and Procedures: electrolyte and renal panel, gastrointestinal profile, glucose/endocrine profile  Nutrition-Focused Physical Findings: overall appearance     Nutrition Risk  Level of Risk/Frequency of Follow-up: low     Nutrition Follow-Up  RD Follow-up?: Yes      Marie Manning, FRANCISCO 01/04/2023 3:44 PM

## 2023-01-05 VITALS
SYSTOLIC BLOOD PRESSURE: 151 MMHG | HEART RATE: 76 BPM | BODY MASS INDEX: 26.19 KG/M2 | OXYGEN SATURATION: 95 % | TEMPERATURE: 98 F | RESPIRATION RATE: 18 BRPM | HEIGHT: 66 IN | WEIGHT: 162.94 LBS | DIASTOLIC BLOOD PRESSURE: 93 MMHG

## 2023-01-05 LAB
ANION GAP SERPL CALC-SCNC: 7 MMOL/L (ref 8–16)
BUN SERPL-MCNC: 11 MG/DL (ref 8–23)
CALCIUM SERPL-MCNC: 8.6 MG/DL (ref 8.7–10.5)
CHLORIDE SERPL-SCNC: 104 MMOL/L (ref 95–110)
CO2 SERPL-SCNC: 28 MMOL/L (ref 23–29)
CREAT SERPL-MCNC: 0.5 MG/DL (ref 0.5–1.4)
ERYTHROCYTE [DISTWIDTH] IN BLOOD BY AUTOMATED COUNT: 13.1 % (ref 11.5–14.5)
EST. GFR  (NO RACE VARIABLE): >60 ML/MIN/1.73 M^2
GLUCOSE SERPL-MCNC: 126 MG/DL (ref 70–110)
GLUCOSE SERPL-MCNC: 149 MG/DL (ref 70–110)
GLUCOSE SERPL-MCNC: 161 MG/DL (ref 70–110)
HCT VFR BLD AUTO: 35.1 % (ref 37–48.5)
HGB BLD-MCNC: 11.2 G/DL (ref 12–16)
MCH RBC QN AUTO: 29.4 PG (ref 27–31)
MCHC RBC AUTO-ENTMCNC: 31.9 G/DL (ref 32–36)
MCV RBC AUTO: 92 FL (ref 82–98)
PLATELET # BLD AUTO: 254 K/UL (ref 150–450)
PMV BLD AUTO: 9.9 FL (ref 9.2–12.9)
POTASSIUM SERPL-SCNC: 3.9 MMOL/L (ref 3.5–5.1)
RBC # BLD AUTO: 3.81 M/UL (ref 4–5.4)
SODIUM SERPL-SCNC: 139 MMOL/L (ref 136–145)
WBC # BLD AUTO: 5.03 K/UL (ref 3.9–12.7)

## 2023-01-05 PROCEDURE — 99900031 HC PATIENT EDUCATION (STAT)

## 2023-01-05 PROCEDURE — 25000003 PHARM REV CODE 250: Performed by: INTERNAL MEDICINE

## 2023-01-05 PROCEDURE — 27000221 HC OXYGEN, UP TO 24 HOURS

## 2023-01-05 PROCEDURE — 85027 COMPLETE CBC AUTOMATED: CPT | Performed by: INTERNAL MEDICINE

## 2023-01-05 PROCEDURE — 80048 BASIC METABOLIC PNL TOTAL CA: CPT | Performed by: INTERNAL MEDICINE

## 2023-01-05 PROCEDURE — 94640 AIRWAY INHALATION TREATMENT: CPT

## 2023-01-05 PROCEDURE — 36415 COLL VENOUS BLD VENIPUNCTURE: CPT | Performed by: INTERNAL MEDICINE

## 2023-01-05 PROCEDURE — 25000242 PHARM REV CODE 250 ALT 637 W/ HCPCS: Performed by: INTERNAL MEDICINE

## 2023-01-05 PROCEDURE — 99900035 HC TECH TIME PER 15 MIN (STAT)

## 2023-01-05 PROCEDURE — 63600175 PHARM REV CODE 636 W HCPCS: Performed by: INTERNAL MEDICINE

## 2023-01-05 PROCEDURE — 94799 UNLISTED PULMONARY SVC/PX: CPT

## 2023-01-05 PROCEDURE — 94761 N-INVAS EAR/PLS OXIMETRY MLT: CPT

## 2023-01-05 RX ORDER — PROMETHAZINE HYDROCHLORIDE AND DEXTROMETHORPHAN HYDROBROMIDE 6.25; 15 MG/5ML; MG/5ML
5 SYRUP ORAL EVERY 6 HOURS PRN
Qty: 120 ML | Refills: 0 | Status: SHIPPED | OUTPATIENT
Start: 2023-01-05

## 2023-01-05 RX ORDER — BENZONATATE 100 MG/1
100 CAPSULE ORAL 3 TIMES DAILY PRN
Qty: 30 CAPSULE | Refills: 0 | Status: SHIPPED | OUTPATIENT
Start: 2023-01-05 | End: 2023-01-15

## 2023-01-05 RX ORDER — LEVOFLOXACIN 500 MG/1
500 TABLET, FILM COATED ORAL DAILY
Qty: 2 TABLET | Refills: 0 | Status: SHIPPED | OUTPATIENT
Start: 2023-01-05 | End: 2023-01-07

## 2023-01-05 RX ORDER — LORAZEPAM 0.5 MG/1
0.5 TABLET ORAL EVERY 8 HOURS PRN
Qty: 15 TABLET | Refills: 0 | Status: SHIPPED | OUTPATIENT
Start: 2023-01-05 | End: 2023-01-10

## 2023-01-05 RX ADMIN — GUAIFENESIN 600 MG: 600 TABLET, EXTENDED RELEASE ORAL at 09:01

## 2023-01-05 RX ADMIN — LEVOFLOXACIN 750 MG: 5 INJECTION, SOLUTION INTRAVENOUS at 05:01

## 2023-01-05 RX ADMIN — ONDANSETRON 4 MG: 2 INJECTION INTRAMUSCULAR; INTRAVENOUS at 04:01

## 2023-01-05 RX ADMIN — IPRATROPIUM BROMIDE AND ALBUTEROL SULFATE 3 ML: .5; 3 SOLUTION RESPIRATORY (INHALATION) at 01:01

## 2023-01-05 RX ADMIN — PANTOPRAZOLE SODIUM 40 MG: 40 TABLET, DELAYED RELEASE ORAL at 05:01

## 2023-01-05 RX ADMIN — ENOXAPARIN SODIUM 40 MG: 100 INJECTION SUBCUTANEOUS at 05:01

## 2023-01-05 NOTE — RESPIRATORY THERAPY
01/04/23 1140   Home Oxygen Qualification   $ Home O2 Qualification Pulmonary Stress Test/6 min walk;Tech time 15 minutes   Room Air SpO2 At Rest 95 %   Room Air SpO2 During Ambulation 91 %   Heart Rate on O2 85 bpm   SpO2 Post Ambulation 93 %   Post Ambulation Heart Rate 74 bpm   Home O2 Eval Comments patient did not need oxygen

## 2023-01-05 NOTE — CARE UPDATE
01/05/23 1335   Patient Assessment/Suction   Level of Consciousness (AVPU) alert   Respiratory Effort Unlabored   Expansion/Accessory Muscles/Retractions no retractions   All Lung Fields Breath Sounds diminished   Rhythm/Pattern, Respiratory pattern regular   Cough Frequency frequent   Cough Type good   PRE-TX-O2   Device (Oxygen Therapy) room air   $ Is the patient on Low Flow Oxygen? Yes   Flow (L/min) 3   SpO2 95 %   Pulse Oximetry Type Intermittent   $ Pulse Oximetry - Multiple Charge Pulse Oximetry - Multiple   Pulse 76   Resp 18   Aerosol Therapy   $ Aerosol Therapy Charges Aerosol Treatment   Daily Review of Necessity (SVN) completed   Respiratory Treatment Status (SVN) given   Treatment Route (SVN) mouthpiece;oxygen   Patient Position (SVN) HOB elevated   Post Treatment Assessment (SVN) increased aeration   Signs of Intolerance (SVN) none   Education   $ Education 15 min;Bronchodilator   Respiratory Evaluation   $ Care Plan Tech Time 15 min   $ Eval/Re-eval Charges Evaluation   Evaluation For   (care plan)

## 2023-01-05 NOTE — PT/OT/SLP PROGRESS
Physical Therapy      Patient Name:  Doris Hoover   MRN:  [id number]    Patient not seen today secondary to Other (Comment) (Prepping for discharge.). Will follow-up next service date if still here.

## 2023-01-05 NOTE — PLAN OF CARE
Pt cleared for discharge from case management    [facility] DME approved RW and SW delivered and pt signed for delivery.     01/05/23 1422   Final Note   Assessment Type Final Discharge Note   Anticipated Discharge Disposition Home   What phone number can be called within the next 1-3 days to see how you are doing after discharge?   ([phone])   Hospital Resources/Appts/Education Provided Appointments scheduled and added to AVS

## 2023-01-06 NOTE — HOSPITAL COURSE
62-year-old  female with type 2 DM and essential hypertension presented to the ED with chief complaint of fever and cough of 2 day onset.  Positive for sick contacts.  Found to have right lower lobe pneumonia. Treated for acute hypoxic respiratory failure secondary to pneumonia with broad-spectrum antibiotics and transitioned to levofloxacin at discharge.  In addition she received supportive care with supplemental oxygen, antitussives and bronchodilators.  MRSA screen negative.  She had significant improvement in symptoms.  Home O2 assessment on day of discharge demonstrated no need for supplemental oxygen.  Deemed medically stable for discharge to home.  Advised follow up with PCP.    I have seen the patient on the day of discharge and reviewed the discharge instructions as outlined below. Patient as well as her daughter verbalized understanding and are aware to contact primary care physician or return to ED if new or worsening symptoms.

## 2023-01-06 NOTE — DISCHARGE SUMMARY
[facility]  Discharge Summary      Patient Name: Doris Hoover  MRN: [id number]  OXCHITL: [id number]  Patient Class: IP- Inpatient  Admission Date: 12/30/2022  Hospital Length of Stay: 6 days  Discharge Date and Time: 1/5/2023  3:02 PM  Attending Physician: No att. providers found   Discharging Provider: Dirk Amaro MD  Primary Care Provider: Arnold Buchanan NP    Primary Care Team: Networked reference to record PCT     Hospital Course:   62-year-old  female with type 2 DM and essential hypertension presented to the ED with chief complaint of fever and cough of 2 day onset.  Positive for sick contacts.  Found to have right lower lobe pneumonia. Treated for acute hypoxic respiratory failure secondary to pneumonia with broad-spectrum antibiotics and transitioned to levofloxacin at discharge.  In addition she received supportive care with supplemental oxygen, antitussives and bronchodilators.  MRSA screen negative.  She had significant improvement in symptoms.  Home O2 assessment on day of discharge demonstrated no need for supplemental oxygen.  Deemed medically stable for discharge to home.  Advised follow up with PCP.    I have seen the patient on the day of discharge and reviewed the discharge instructions as outlined below. Patient as well as her daughter verbalized understanding and are aware to contact primary care physician or return to ED if new or worsening symptoms.         Goals of Care Treatment Preferences:  Code Status: Full Code      Consults:   Consults (From admission, onward)        Status Ordering Provider     Inpatient consult to Pulmonology  Once        Provider:  MD Boogie Traore ALAN          No new Assessment & Plan notes have been filed under this hospital service since the last note was generated.  Service: Hospital Medicine    Final Active Diagnoses:    Diagnosis Date Noted POA    PRINCIPAL PROBLEM:  Pneumonia due to  "infectious organism [J18.9] 12/30/2022 Yes    Hypoxemia requiring supplemental oxygen [R09.02, Z99.81] 12/31/2022 Yes    Acute hypoxemic respiratory failure [J96.01] 12/30/2022 Yes    Essential hypertension [I10] 11/15/2017 Yes    Type 2 diabetes mellitus, without long-term current use of insulin [E11.9] 11/15/2017 Yes      Problems Resolved During this Admission:       Discharged Condition: stable    Disposition: Home or Self Care    Follow Up:   Follow-up Information     Arnold Buchanan NP. Go to.    Specialty: Family Medicine  Why: APPOINTMENT:  January 12, 2023 at 9:30am  Hospital discharge follow-up  Contact information:  [address] [place] [address] [place] [address] [phone]                       Patient Instructions:      WALKER FOR HOME USE     Order Specific Question Answer Comments   Type of Walker: Adult (5'4"-6'6")    With wheels? Yes 2 front   Height: 5' 6" (1.676 m)    Weight: 73.9 kg (162 lb 14.7 oz)    Length of need (1-99 months): 99    Does patient have medical equipment at home? nebulizer    Please check all that apply: Patient's condition impairs ambulation.      Diet diabetic     Notify your health care provider if you experience any of the following:  temperature >100.4     Notify your health care provider if you experience any of the following:  severe uncontrolled pain     Notify your health care provider if you experience any of the following:  difficulty breathing or increased cough     Notify your health care provider if you experience any of the following:  persistent dizziness, light-headedness, or visual disturbances     Notify your health care provider if you experience any of the following:   Order Comments: Worsening or recurrent symptoms     Notify your health care provider if you experience any of the following:  increased confusion or weakness     Activity as tolerated       Significant Diagnostic Studies: Labs:   CMP   Recent Labs   Lab 01/05/23  0522      K 3.9    "   CO2 28   *   BUN 11   CREATININE 0.5   CALCIUM 8.6*   ANIONGAP 7*    and CBC   Recent Labs   Lab 01/05/23  0522   WBC 5.03   HGB 11.2*   HCT 35.1*          Pending Diagnostic Studies:     None         Medications:  Reconciled Home Medications:      Medication List      START taking these medications    levoFLOXacin 500 MG tablet  Commonly known as: LEVAQUIN  Take 1 tablet (500 mg total) by mouth once daily. for 2 days     LORazepam 0.5 MG tablet  Commonly known as: ATIVAN  Take 1 tablet (0.5 mg total) by mouth every 8 (eight) hours as needed for Anxiety.        CHANGE how you take these medications    atenoloL 25 MG tablet  Commonly known as: TENORMIN  Take 25 mg by mouth 2 (two) times daily.  What changed: Another medication with the same name was removed. Continue taking this medication, and follow the directions you see here.     benzonatate 100 MG capsule  Commonly known as: TESSALON  Take 1 capsule (100 mg total) by mouth 3 (three) times daily as needed for Cough.  What changed:   · when to take this  · reasons to take this     glipiZIDE 5 MG tablet  Commonly known as: GLUCOTROL  Take 5 mg by mouth once daily.  What changed: Another medication with the same name was removed. Continue taking this medication, and follow the directions you see here.     promethazine-dextromethorphan 6.25-15 mg/5 mL Syrp  Commonly known as: PROMETHAZINE-DM  Take 5 mLs by mouth every 6 (six) hours as needed (cough).  What changed:   · how much to take  · reasons to take this        CONTINUE taking these medications    acetaminophen 650 MG Tbsr  Commonly known as: TYLENOL  Take 650 mg by mouth every 8 (eight) hours.     amLODIPine 10 MG tablet  Commonly known as: NORVASC  Take 10 mg by mouth once daily.     magnesium 250 mg Tab  Take 1 tablet by mouth once daily.     MUCINEX 1,200 mg Ta12  Generic drug: guaiFENesin  Take 1,200 mg by mouth 2 (two) times a day.     multivitamin per tablet  Commonly known as:  THERAGRAN  Take 1 tablet by mouth once daily.     OZEMPIC 1 mg/dose (2 mg/1.5 mL) Pnij  Generic drug: semaglutide  Inject 1 mg into the skin every 7 days.     pantoprazole 40 MG tablet  Commonly known as: PROTONIX  Take 40 mg by mouth once daily.     rosuvastatin 40 MG Tab  Commonly known as: CRESTOR  Take 40 mg by mouth once daily.     VITAMIN D3 25 mcg (1,000 unit) capsule  Generic drug: cholecalciferol (vitamin D3)  Take 1,000 Units by mouth once daily.        STOP taking these medications    azithromycin 250 MG tablet  Commonly known as: Z-LISSET     b complex vitamins capsule     ciprofloxacin HCl 500 MG tablet  Commonly known as: CIPRO     glimepiride 4 MG tablet  Commonly known as: AMARYL     liraglutide 0.6 mg/0.1 mL (18 mg/3 mL) subq PNIJ 0.6 mg/0.1 mL (18 mg/3 mL) Pnij pen  Commonly known as: VICTOZA 2-LISSET     lisinopriL 30 MG tablet  Commonly known as: PRINIVIL,ZESTRIL     metroNIDAZOLE 500 MG tablet  Commonly known as: FLAGYL     omeprazole 40 MG capsule  Commonly known as: PRILOSEC     ondansetron 4 MG Tbdl  Commonly known as: ZOFRAN-ODT     tiZANidine 4 MG tablet  Commonly known as: ZANAFLEX            Time spent on the discharge of patient: 35 minutes         Dirk Amaro MD  Department of Hospital Medicine  [facility]

## 2023-04-10 PROBLEM — J18.9 PNEUMONIA DUE TO INFECTIOUS ORGANISM: Status: RESOLVED | Noted: 2022-12-30 | Resolved: 2023-04-10

## 2023-04-10 PROBLEM — J96.01 ACUTE HYPOXEMIC RESPIRATORY FAILURE: Status: RESOLVED | Noted: 2022-12-30 | Resolved: 2023-04-10

## 2023-11-09 DIAGNOSIS — S32.040S CLOSED COMPRESSION FRACTURE OF L4 LUMBAR VERTEBRA, SEQUELA: Primary | ICD-10-CM

## 2023-11-10 DIAGNOSIS — Z12.31 SCREENING MAMMOGRAM FOR HIGH-RISK PATIENT: Primary | ICD-10-CM

## 2023-12-01 ENCOUNTER — HOSPITAL ENCOUNTER (OUTPATIENT)
Dept: RADIOLOGY | Facility: HOSPITAL | Age: 63
Discharge: HOME | End: 2023-12-01
Attending: NURSE PRACTITIONER
Payer: COMMERCIAL

## 2023-12-01 DIAGNOSIS — S32.040S CLOSED COMPRESSION FRACTURE OF L4 LUMBAR VERTEBRA, SEQUELA: ICD-10-CM

## 2023-12-01 PROCEDURE — 72148 MRI LUMBAR SPINE W/O DYE: CPT | Mod: TC,PO

## 2024-02-12 DIAGNOSIS — M48.56XA COLLAPSE OF LUMBAR VERTEBRA: Primary | ICD-10-CM

## 2024-02-19 ENCOUNTER — HOSPITAL ENCOUNTER (OUTPATIENT)
Dept: RADIOLOGY | Facility: HOSPITAL | Age: 64
Discharge: HOME | End: 2024-02-19
Attending: NEUROLOGICAL SURGERY
Payer: COMMERCIAL

## 2024-02-19 DIAGNOSIS — M48.56XA COLLAPSE OF LUMBAR VERTEBRA: ICD-10-CM

## 2024-02-19 PROCEDURE — 72100 X-RAY EXAM L-S SPINE 2/3 VWS: CPT | Mod: TC

## 2024-02-20 ENCOUNTER — HOSPITAL ENCOUNTER (OUTPATIENT)
Dept: RADIOLOGY | Facility: HOSPITAL | Age: 64
Discharge: HOME | End: 2024-02-20
Attending: NEUROLOGICAL SURGERY
Payer: COMMERCIAL

## 2024-02-20 DIAGNOSIS — M48.56XA: ICD-10-CM

## 2024-02-20 DIAGNOSIS — M48.56XA: Primary | ICD-10-CM

## 2024-02-20 PROCEDURE — 72110 X-RAY EXAM L-2 SPINE 4/>VWS: CPT | Mod: TC

## 2024-12-23 ENCOUNTER — HOSPITAL ENCOUNTER (INPATIENT)
Facility: HOSPITAL | Age: 64
LOS: 7 days | Discharge: NURSING, REHAB OR LONG-TERM CARE | DRG: 481 | End: 2024-12-30
Attending: STUDENT IN AN ORGANIZED HEALTH CARE EDUCATION/TRAINING PROGRAM | Admitting: STUDENT IN AN ORGANIZED HEALTH CARE EDUCATION/TRAINING PROGRAM
Payer: COMMERCIAL

## 2024-12-23 DIAGNOSIS — E11.9 TYPE 2 DIABETES MELLITUS WITHOUT COMPLICATION, WITHOUT LONG-TERM CURRENT USE OF INSULIN: ICD-10-CM

## 2024-12-23 DIAGNOSIS — S72.009A HIP FRACTURE: Primary | ICD-10-CM

## 2024-12-23 PROBLEM — S72.141A CLOSED DISPLACED INTERTROCHANTERIC FRACTURE OF RIGHT FEMUR: Status: ACTIVE | Noted: 2024-12-23

## 2024-12-23 LAB
ABO + RH BLD: NORMAL
BLD GP AB SCN CELLS X3 SERPL QL: NORMAL
POCT GLUCOSE: 269 MG/DL (ref 70–110)
SPECIMEN OUTDATE: NORMAL

## 2024-12-23 PROCEDURE — 25000003 PHARM REV CODE 250: Performed by: NURSE PRACTITIONER

## 2024-12-23 PROCEDURE — 99900035 HC TECH TIME PER 15 MIN (STAT)

## 2024-12-23 PROCEDURE — 36415 COLL VENOUS BLD VENIPUNCTURE: CPT | Performed by: NURSE PRACTITIONER

## 2024-12-23 PROCEDURE — 25000003 PHARM REV CODE 250: Performed by: STUDENT IN AN ORGANIZED HEALTH CARE EDUCATION/TRAINING PROGRAM

## 2024-12-23 PROCEDURE — 86900 BLOOD TYPING SEROLOGIC ABO: CPT | Performed by: NURSE PRACTITIONER

## 2024-12-23 PROCEDURE — 94761 N-INVAS EAR/PLS OXIMETRY MLT: CPT

## 2024-12-23 PROCEDURE — 63600175 PHARM REV CODE 636 W HCPCS: Performed by: NURSE PRACTITIONER

## 2024-12-23 PROCEDURE — 11000001 HC ACUTE MED/SURG PRIVATE ROOM

## 2024-12-23 RX ORDER — SIMETHICONE 80 MG
1 TABLET,CHEWABLE ORAL 4 TIMES DAILY PRN
Status: DISCONTINUED | OUTPATIENT
Start: 2024-12-23 | End: 2024-12-30 | Stop reason: HOSPADM

## 2024-12-23 RX ORDER — ALUMINUM HYDROXIDE, MAGNESIUM HYDROXIDE, AND SIMETHICONE 1200; 120; 1200 MG/30ML; MG/30ML; MG/30ML
30 SUSPENSION ORAL 4 TIMES DAILY PRN
Status: DISCONTINUED | OUTPATIENT
Start: 2024-12-23 | End: 2024-12-30 | Stop reason: HOSPADM

## 2024-12-23 RX ORDER — INSULIN ASPART 100 [IU]/ML
1-10 INJECTION, SOLUTION INTRAVENOUS; SUBCUTANEOUS
Status: DISCONTINUED | OUTPATIENT
Start: 2024-12-23 | End: 2024-12-30 | Stop reason: HOSPADM

## 2024-12-23 RX ORDER — PANTOPRAZOLE SODIUM 40 MG/1
40 TABLET, DELAYED RELEASE ORAL DAILY
Status: DISCONTINUED | OUTPATIENT
Start: 2024-12-24 | End: 2024-12-30 | Stop reason: HOSPADM

## 2024-12-23 RX ORDER — SODIUM,POTASSIUM PHOSPHATES 280-250MG
2 POWDER IN PACKET (EA) ORAL
Status: DISCONTINUED | OUTPATIENT
Start: 2024-12-23 | End: 2024-12-30 | Stop reason: HOSPADM

## 2024-12-23 RX ORDER — ACETAMINOPHEN 325 MG/1
650 TABLET ORAL EVERY 4 HOURS PRN
Status: DISCONTINUED | OUTPATIENT
Start: 2024-12-23 | End: 2024-12-24

## 2024-12-23 RX ORDER — IBUPROFEN 200 MG
16 TABLET ORAL
Status: DISCONTINUED | OUTPATIENT
Start: 2024-12-23 | End: 2024-12-30 | Stop reason: HOSPADM

## 2024-12-23 RX ORDER — ACETAMINOPHEN 325 MG/1
650 TABLET ORAL EVERY 6 HOURS PRN
Status: DISCONTINUED | OUTPATIENT
Start: 2024-12-23 | End: 2024-12-24

## 2024-12-23 RX ORDER — MUPIROCIN 20 MG/G
OINTMENT TOPICAL 2 TIMES DAILY
Status: DISPENSED | OUTPATIENT
Start: 2024-12-23 | End: 2024-12-28

## 2024-12-23 RX ORDER — LANOLIN ALCOHOL/MO/W.PET/CERES
800 CREAM (GRAM) TOPICAL
Status: DISCONTINUED | OUTPATIENT
Start: 2024-12-23 | End: 2024-12-30 | Stop reason: HOSPADM

## 2024-12-23 RX ORDER — SODIUM CHLORIDE 0.9 % (FLUSH) 0.9 %
10 SYRINGE (ML) INJECTION EVERY 8 HOURS PRN
Status: DISCONTINUED | OUTPATIENT
Start: 2024-12-23 | End: 2024-12-30 | Stop reason: HOSPADM

## 2024-12-23 RX ORDER — NALOXONE HCL 0.4 MG/ML
0.02 VIAL (ML) INJECTION
Status: DISCONTINUED | OUTPATIENT
Start: 2024-12-23 | End: 2024-12-30 | Stop reason: HOSPADM

## 2024-12-23 RX ORDER — TALC
9 POWDER (GRAM) TOPICAL NIGHTLY PRN
Status: DISCONTINUED | OUTPATIENT
Start: 2024-12-23 | End: 2024-12-30 | Stop reason: HOSPADM

## 2024-12-23 RX ORDER — IPRATROPIUM BROMIDE AND ALBUTEROL SULFATE 2.5; .5 MG/3ML; MG/3ML
3 SOLUTION RESPIRATORY (INHALATION) EVERY 4 HOURS PRN
Status: DISCONTINUED | OUTPATIENT
Start: 2024-12-23 | End: 2024-12-30 | Stop reason: HOSPADM

## 2024-12-23 RX ORDER — OXYCODONE HYDROCHLORIDE 5 MG/1
5 TABLET ORAL EVERY 6 HOURS PRN
Status: DISCONTINUED | OUTPATIENT
Start: 2024-12-23 | End: 2024-12-24

## 2024-12-23 RX ORDER — ATENOLOL 25 MG/1
25 TABLET ORAL 2 TIMES DAILY
Status: DISCONTINUED | OUTPATIENT
Start: 2024-12-23 | End: 2024-12-24

## 2024-12-23 RX ORDER — HYDROMORPHONE HYDROCHLORIDE 1 MG/ML
1 INJECTION, SOLUTION INTRAMUSCULAR; INTRAVENOUS; SUBCUTANEOUS EVERY 4 HOURS PRN
Status: DISCONTINUED | OUTPATIENT
Start: 2024-12-23 | End: 2024-12-24

## 2024-12-23 RX ORDER — GLUCAGON 1 MG
1 KIT INJECTION
Status: DISCONTINUED | OUTPATIENT
Start: 2024-12-23 | End: 2024-12-30 | Stop reason: HOSPADM

## 2024-12-23 RX ORDER — AMLODIPINE BESYLATE 5 MG/1
10 TABLET ORAL DAILY
Status: DISCONTINUED | OUTPATIENT
Start: 2024-12-24 | End: 2024-12-24

## 2024-12-23 RX ORDER — ONDANSETRON HYDROCHLORIDE 2 MG/ML
4 INJECTION, SOLUTION INTRAVENOUS EVERY 8 HOURS PRN
Status: DISCONTINUED | OUTPATIENT
Start: 2024-12-23 | End: 2024-12-30 | Stop reason: HOSPADM

## 2024-12-23 RX ORDER — IBUPROFEN 200 MG
24 TABLET ORAL
Status: DISCONTINUED | OUTPATIENT
Start: 2024-12-23 | End: 2024-12-30 | Stop reason: HOSPADM

## 2024-12-23 RX ADMIN — INSULIN ASPART 3 UNITS: 100 INJECTION, SOLUTION INTRAVENOUS; SUBCUTANEOUS at 08:12

## 2024-12-23 RX ADMIN — OXYCODONE 5 MG: 5 TABLET ORAL at 11:12

## 2024-12-23 RX ADMIN — OXYCODONE 5 MG: 5 TABLET ORAL at 06:12

## 2024-12-23 RX ADMIN — ATENOLOL 25 MG: 25 TABLET ORAL at 08:12

## 2024-12-23 RX ADMIN — MUPIROCIN 1 G: 20 OINTMENT TOPICAL at 08:12

## 2024-12-23 RX ADMIN — INSULIN ASPART 2 UNITS: 100 INJECTION, SOLUTION INTRAVENOUS; SUBCUTANEOUS at 06:12

## 2024-12-23 NOTE — PLAN OF CARE
"   Outside Transfer Acceptance Note / [facility]    Referring facility: [facility]   Referring provider: JANIE BILLINGS  Accepting facility: [facility]  Accepting provider: ALPHONSO ALVAREZ  Admitting provider: Alma HOUSTON Newberry, Drew, MD  Reason for transfer: Higher Level of Care   Transfer diagnosis: R hip fracture  Transfer specialty requested: Orthopedic Surgery  Transfer specialty notified: Yes  Transfer level: NUMBER 1-5: 2  Bed type requested: med/surg  Isolation status: No active isolations   Admission class or status: IP- Inpatient      Narrative     Doris Hoover is a 65yo F w/ HTN, HLD, GERD, asthma, MVP, T2DM,  chronic interstitial cystitis with hematuria who presents to [facility] 12[facility] s/p slip on wet floor and fall resulting in R thigh pain. Hip xray w/ Displaced, overriding and angulated oblique intertrochanteric/subtrochanteric fracture of the right proximal femur. CT pending. Per ED report, Pt w/ notable R thigh swelling, no visible bruising; initial Hgb stable to priors, repeat pending.    Request for transfer to [facility] for higher level of care; Dr. Olson Orthopedic Surgery agreeable to consultation upon arrival with tentative intervention planned 12/24.    Objective     Last Filed Vital Signs  - documented in this encounter   Last Filed Vital Signs  Vital Sign Reading Time Taken Comments   Blood Pressure 142/98 12/23/2024 11:22 AM CST     Pulse 82 12/23/2024 11:22 AM CST     Temperature 36.5 °C (97.7 °F) 12/23/2024 11:22 AM CST     Respiratory Rate 16 12/23/2024 11:22 AM CST     Oxygen Saturation 100% 12/23/2024 11:22 AM CST     Inhaled Oxygen Concentration - -     Weight 75.8 kg (167 lb) 12/23/2024 11:22 AM CST     Height 167.6 cm (5' 6") 12/23/2024 11:22 AM CST     Body Mass Index 26.95 12/23/2024 11:22 AM CST      ) Comprehensive metabolic panel (12/23/2024 11:40 AM CST)  Lab " Results - (ABNORMAL) Comprehensive metabolic panel (12/23/2024 11:40 AM [facility])  Component Value Ref Range Test Method Analysis Time Performed At Pathologist Signature   Sodium 140 136 - 145 mmol/L   12/23/2024 12:10 PM [facility]     Potassium 3.4 3.4 - 4.5 mmol/L   12/23/2024 12:10 PM [facility]     Chloride 108 (H) 98 - 107 mmol/L   12/23/2024 12:10 PM [facility]     CO2 25 21 - 31 mmol/L   12/23/2024 12:10 PM [facility]     BUN 11 7 - 25 mg/dL   12/23/2024 12:10 PM [facility]     Creatinine 0.80 0.60 - 1.30 mg/dL   12/23/2024 12:10 PM [facility]     Glucose 181 (H) 74 - 109 mg/dL   12/23/2024 12:10 PM [facility]     Calcium 9.0 8.6 - 10.3 mg/dL   12/23/2024 12:10 PM [facility]     Albumin 4.2 3.5 - 5.2 g/dL   12/23/2024 12:10 PM [facility]     AST 15 13 - 39 U/L   12/23/2024 12:10 PM [facility]     ALT 9 7 - 52 U/L   12/23/2024 12:10 PM [facility]     Alkaline Phosphatase 90 34 - 104 U/L   12/23/2024 12:10 PM [facility]     Bilirubin, Total 0.6 0.3 - 1.0 mg/dL   12/23/2024 12:10 PM [facility]     Total Protein 6.5 6.4 - 8.9 g/dL   12/23/2024 12:10 PM [facility]     Anion Gap 7 3 - 15 mmol/L   12/23/2024 12:10 PM [facility]     A/G ratio 1.8 1.0 - 2.0   12/23/2024 12:10 PM [facility]     Globulin 2.3 2.0 - 4.0 g/dL   12/23/2024 12:10 PM [facility]     Osmolality Calc 283 275 - 295 mOsmol/kg   12/23/2024 12:10 PM [facility]     eGFR 82 >=60 mL/min   12/23/2024 12:10 PM [facility]       CBC auto differential (12/23/2024 11:40 AM [facility])  Lab Results - (ABNORMAL) CBC auto differential (12/23/2024 11:40 AM [facility])  Component Value Ref Range Test Method Analysis Time Performed At [facility] Signature   WBC 7.4 4.8 - 10.8 may/L   12/23/2024 11:48 AM [facility]     RBC 3.98 (L) 4.50 - 5.50 tril/L   12/23/2024 11:48 AM [facility]     Hemoglobin 12.1 12.0 - 15.0 g/dL   12/23/2024 11:48 AM [facility]     Hematocrit 36.0 (L) 37.0 - 47.0 %    12/23/2024 11:48 AM [facility]     MCV 91 81 - 97 fL   12/23/2024 11:48 AM [facility]     MCH 31 27 - 32 pg   12/23/2024 11:48 AM [facility]     MCHC 34 32 - 36 g/dL   12/23/2024 11:48 AM [facility]     RDW 13.8 11.5 - 14.5 %   12/23/2024 11:48 AM [facility]     Platelet Count- Automated 194 150 - 400 may/L   12/23/2024 11:48 AM [facility]     MPV 9.0 7.4 - 10.4 fL   12/23/2024 11:48 AM [facility]     Granulocyte Relative 49.1 42.2 - 75.2 %   12/23/2024 11:48 AM [facility]     Lymphocytes Relative 39.8 20.5 - 51.1 %   12/23/2024 11:48 AM [facility]     Monocytes Relative 9.1 1.7 - 9.3 %   12/23/2024 11:48 AM [facility]     Eosinophils Relative 1.5 0.0 - <5.0 %   12/23/2024 11:48 AM [facility]     Basophils Relative 0.5 0.0 - <5.0 %   12/23/2024 11:48 AM [facility]     Granulocytes Absolute 3.7 1.4 - 6.5 K/UL   12/23/2024 11:48 AM [facility]     Lymphocyte Absolute 3.0 1.2 - 3.4 K/uL   12/23/2024 11:48 AM [facility]     Monocyte Absolute 0.70 (H) 0.11 - 0.59 K/uL   12/23/2024 11:48 AM [facility]     Eosinophils Absolute 0.10 0.00 - 0.70 K/UL   12/23/2024 11:48 AM [facility]     Basophils Absolute 0.00 0.00 - 0.20 K/UL   12/23/2024 11:48 AM [facility]     ANC 3,700 1,400 - 6,500 /uL   12/23/2024 11:48 AM [facility]     Nucleated RBCS 0 0 - 0 /100   12/23/2024 11:48 AM CST [facility]       RADIOLOGIC EXAM: XR HIP PORTABLE RT 2 views    DATE AND TIME OF EXAMINATION: 12/23/2024 11:29 AM    CLINICAL HISTORY: pain    COMPARISON: None.    IMPRESSION:  Findings/impression:    Displaced, overriding and angulated oblique intertrochanteric/subtrochanteric fracture of the right proximal femur. No discrete underlying osseous lesion is identified radiographically; however, evaluation is degraded secondary to technique.    This report was signed by Yung Wilcox MD on 12/23/2024 11:47 AM on the following workstation: [id number].       Instructions       Counts include 234 beds at the [facility]  Admit to Hospital Medicine  Upon patient arrival to floor, please contact Hospital Medicine on call.

## 2024-12-23 NOTE — HPI
Doris Hoover is a 64 year female who presented in transfer from [facility] in [place].  She was transferred for orthopedic services.  She was transferred for a displaced right intertrochanteric fracture.  She sustained mechanical fall at residence.  She does not not take anticoagulants.  No head injury or loss of consciousness.  No recent fever chills.  No known sick contacts or travel.  She describes the pain as intense.  She rates the pain at 10/10.  Pain is worse with any movement of the right leg.  Minimal relief with remaining still.  Previous medical history includes asthma, hypertension, interstitial cystitis, diabetes.  Outside facility labs reviewed.  CBC unremarkable.  Glucose 181.  Admitted to Hospital Medicine.  Plans for OR repair in a.m. with Dr. Olson.

## 2024-12-23 NOTE — SUBJECTIVE & OBJECTIVE
Past Medical History:   Diagnosis Date    Allergy     Diabetes mellitus, type 2     Hypertension        Past Surgical History:   Procedure Laterality Date     SECTION      3    HYSTERECTOMY      TONSILLECTOMY         Review of patient's allergies indicates:   Allergen Reactions    Butorphanol tartrate Other (See Comments)     Hallucinations  hallucinations      Methylprednisolone Other (See Comments) and Shortness Of Breath     flushing  flushing         Current Facility-Administered Medications on File Prior to Encounter   Medication    [DISCONTINUED] acetaminophen Soln    [DISCONTINUED] fentaNYL 50 mcg/mL injection    [DISCONTINUED] ketorolac injection    [DISCONTINUED] orphenadrine injection     Current Outpatient Medications on File Prior to Encounter   Medication Sig    acetaminophen (TYLENOL) 650 MG TbSR Take 650 mg by mouth every 8 (eight) hours.    amLODIPine (NORVASC) 10 MG tablet Take 10 mg by mouth once daily.     atenoloL (TENORMIN) 25 MG tablet Take 25 mg by mouth 2 (two) times daily.    cholecalciferol, vitamin D3, (VITAMIN D3) 25 mcg (1,000 unit) capsule Take 1,000 Units by mouth once daily.    glipiZIDE (GLUCOTROL) 5 MG tablet Take 5 mg by mouth once daily.    LORazepam (ATIVAN) 0.5 MG tablet Take 1 tablet (0.5 mg total) by mouth every 8 (eight) hours as needed for Anxiety.    magnesium 250 mg Tab Take 1 tablet by mouth once daily.    multivitamin (THERAGRAN) per tablet Take 1 tablet by mouth once daily.    pantoprazole (PROTONIX) 40 MG tablet Take 40 mg by mouth once daily.    promethazine-dextromethorphan (PROMETHAZINE-DM) 6.25-15 mg/5 mL Syrp Take 5 mLs by mouth every 6 (six) hours as needed (cough).    rosuvastatin (CRESTOR) 40 MG Tab Take 40 mg by mouth once daily.    semaglutide (OZEMPIC) 1 mg/dose (2 mg/1.5 mL) PnIj Inject 1 mg into the skin every 7 days.      Family History       Problem Relation (Age of Onset)    Hypertension Mother          Tobacco Use    Smoking status: Never     Smokeless tobacco: Never   Substance and Sexual Activity    Alcohol use: Not Currently    Drug use: Not on file    Sexual activity: Not on file     Review of Systems   Constitutional:  Positive for activity change. Negative for chills, diaphoresis and fever.   HENT:  Negative for congestion, nosebleeds and tinnitus.    Eyes:  Negative for photophobia and visual disturbance.   Respiratory:  Negative for cough, chest tightness, shortness of breath and wheezing.    Cardiovascular:  Negative for chest pain, palpitations and leg swelling.   Gastrointestinal:  Negative for abdominal distention, abdominal pain, constipation, diarrhea, nausea and vomiting.   Endocrine: Negative for cold intolerance and heat intolerance.   Genitourinary:  Negative for difficulty urinating, dysuria, frequency, hematuria and urgency.   Musculoskeletal:  Positive for arthralgias. Negative for back pain and myalgias.   Skin:  Negative for pallor, rash and wound.   Allergic/Immunologic: Negative for immunocompromised state.   Neurological:  Negative for dizziness, tremors, facial asymmetry, speech difficulty and weakness.   Hematological:  Negative for adenopathy. Does not bruise/bleed easily.   Psychiatric/Behavioral:  Negative for confusion and sleep disturbance. The patient is not nervous/anxious.      Objective:     Vital Signs (Most Recent):    Vital Signs (24h Range):  Temp:  [97.7 °F (36.5 °C)] 97.7 °F (36.5 °C)  Pulse:  [74-85] 74  Resp:  [16] 16  SpO2:  [95 %-98 %] 95 %  BP: (136-142)/(59-98) 136/59        There is no height or weight on file to calculate BMI.     Physical Exam  Vitals and nursing note reviewed.   Constitutional:       General: She is not in acute distress.     Appearance: She is well-developed. She is not diaphoretic.   HENT:      Head: Normocephalic.      Mouth/Throat:      Mouth: Mucous membranes are moist.      Pharynx: Oropharynx is clear.   Eyes:      General: No scleral icterus.     Conjunctiva/sclera:  Conjunctivae normal.      Pupils: Pupils are equal, round, and reactive to light.   Neck:      Vascular: No JVD.   Cardiovascular:      Rate and Rhythm: Normal rate and regular rhythm.      Heart sounds: Normal heart sounds. No murmur heard.     No friction rub. No gallop.   Pulmonary:      Effort: Pulmonary effort is normal. No respiratory distress.      Breath sounds: Normal breath sounds. No wheezing or rales.   Abdominal:      General: Bowel sounds are normal. There is no distension.      Palpations: Abdomen is soft.      Tenderness: There is no abdominal tenderness. There is no guarding or rebound.   Musculoskeletal:         General: Tenderness and signs of injury present. Normal range of motion.      Cervical back: Normal range of motion and neck supple.      Comments:   Right leg shortened and rotated outward   Lymphadenopathy:      Cervical: No cervical adenopathy.   Skin:     General: Skin is warm and dry.      Capillary Refill: Capillary refill takes less than 2 seconds.      Coloration: Skin is not pale.      Findings: No erythema or rash.   Neurological:      Mental Status: She is alert and oriented to person, place, and time.      Cranial Nerves: No cranial nerve deficit.      Sensory: No sensory deficit.      Coordination: Coordination normal.      Deep Tendon Reflexes: Reflexes normal.   Psychiatric:         Behavior: Behavior normal.         Thought Content: Thought content normal.         Judgment: Judgment normal.              CRANIAL NERVES     CN III, IV, VI   Pupils are equal, round, and reactive to light.       Significant Labs: All pertinent labs within the past 24 hours have been reviewed.    Significant Imaging: I have reviewed all pertinent imaging results/findings within the past 24 hours.

## 2024-12-23 NOTE — ASSESSMENT & PLAN NOTE
Acute problem   NPO after midnight   Ortho consulted   Plans for OR repair in a.m.  Oxycodone p.r.n. moderate pain   Hydromorphone p.r.n. severe pain   Zofran p.r.n. nausea vomiting   PT/OT consult when appropriate

## 2024-12-23 NOTE — H&P
[facility]  History & Physical    Patient Name: Doris Hoover  MRN: [id number]  Patient Class: IP- Inpatient  Admission Date: 2024  Attending Physician: Clemente Mahmood MD   Primary Care Provider: Arnold Buchanan NP         Patient information was obtained from patient, relative(s), past medical records, and ER records.     Subjective:     Principal Problem:Closed displaced intertrochanteric fracture of right femur    Chief Complaint: No chief complaint on file.       HPI: Doris Hoover is a 64 year female who presented in transfer from [facility] in [place].  She was transferred for orthopedic services.  She was transferred for a displaced right intertrochanteric fracture.  She sustained mechanical fall at residence.  She does not not take anticoagulants.  No head injury or loss of consciousness.  No recent fever chills.  No known sick contacts or travel.  She describes the pain as intense.  She rates the pain at 10/10.  Pain is worse with any movement of the right leg.  Minimal relief with remaining still.  Previous medical history includes asthma, hypertension, interstitial cystitis, diabetes.  Outside facility labs reviewed.  CBC unremarkable.  Glucose 181.  Admitted to Hospital Medicine.  Plans for OR repair in a.m. with Dr. Olson.    Past Medical History:   Diagnosis Date    Allergy     Diabetes mellitus, type 2     Hypertension        Past Surgical History:   Procedure Laterality Date     SECTION      3    HYSTERECTOMY      TONSILLECTOMY         Review of patient's allergies indicates:   Allergen Reactions    Butorphanol tartrate Other (See Comments)     Hallucinations  hallucinations      Methylprednisolone Other (See Comments) and Shortness Of Breath     flushing  flushing         Current Facility-Administered Medications on File Prior to Encounter   Medication    [DISCONTINUED] acetaminophen Soln    [DISCONTINUED] fentaNYL 50 mcg/mL  injection    [DISCONTINUED] ketorolac injection    [DISCONTINUED] orphenadrine injection     Current Outpatient Medications on File Prior to Encounter   Medication Sig    acetaminophen (TYLENOL) 650 MG TbSR Take 650 mg by mouth every 8 (eight) hours.    amLODIPine (NORVASC) 10 MG tablet Take 10 mg by mouth once daily.     atenoloL (TENORMIN) 25 MG tablet Take 25 mg by mouth 2 (two) times daily.    cholecalciferol, vitamin D3, (VITAMIN D3) 25 mcg (1,000 unit) capsule Take 1,000 Units by mouth once daily.    glipiZIDE (GLUCOTROL) 5 MG tablet Take 5 mg by mouth once daily.    LORazepam (ATIVAN) 0.5 MG tablet Take 1 tablet (0.5 mg total) by mouth every 8 (eight) hours as needed for Anxiety.    magnesium 250 mg Tab Take 1 tablet by mouth once daily.    multivitamin (THERAGRAN) per tablet Take 1 tablet by mouth once daily.    pantoprazole (PROTONIX) 40 MG tablet Take 40 mg by mouth once daily.    promethazine-dextromethorphan (PROMETHAZINE-DM) 6.25-15 mg/5 mL Syrp Take 5 mLs by mouth every 6 (six) hours as needed (cough).    rosuvastatin (CRESTOR) 40 MG Tab Take 40 mg by mouth once daily.    semaglutide (OZEMPIC) 1 mg/dose (2 mg/1.5 mL) PnIj Inject 1 mg into the skin every 7 days.      Family History       Problem Relation (Age of Onset)    Hypertension Mother          Tobacco Use    Smoking status: Never    Smokeless tobacco: Never   Substance and Sexual Activity    Alcohol use: Not Currently    Drug use: Not on file    Sexual activity: Not on file     Review of Systems   Constitutional:  Positive for activity change. Negative for chills, diaphoresis and fever.   HENT:  Negative for congestion, nosebleeds and tinnitus.    Eyes:  Negative for photophobia and visual disturbance.   Respiratory:  Negative for cough, chest tightness, shortness of breath and wheezing.    Cardiovascular:  Negative for chest pain, palpitations and leg swelling.   Gastrointestinal:  Negative for abdominal distention, abdominal pain, constipation,  diarrhea, nausea and vomiting.   Endocrine: Negative for cold intolerance and heat intolerance.   Genitourinary:  Negative for difficulty urinating, dysuria, frequency, hematuria and urgency.   Musculoskeletal:  Positive for arthralgias. Negative for back pain and myalgias.   Skin:  Negative for pallor, rash and wound.   Allergic/Immunologic: Negative for immunocompromised state.   Neurological:  Negative for dizziness, tremors, facial asymmetry, speech difficulty and weakness.   Hematological:  Negative for adenopathy. Does not bruise/bleed easily.   Psychiatric/Behavioral:  Negative for confusion and sleep disturbance. The patient is not nervous/anxious.      Objective:     Vital Signs (Most Recent):    Vital Signs (24h Range):  Temp:  [97.7 °F (36.5 °C)] 97.7 °F (36.5 °C)  Pulse:  [74-85] 74  Resp:  [16] 16  SpO2:  [95 %-98 %] 95 %  BP: (136-142)/(59-98) 136/59        There is no height or weight on file to calculate BMI.     Physical Exam  Vitals and nursing note reviewed.   Constitutional:       General: She is not in acute distress.     Appearance: She is well-developed. She is not diaphoretic.   HENT:      Head: Normocephalic.      Mouth/Throat:      Mouth: Mucous membranes are moist.      Pharynx: Oropharynx is clear.   Eyes:      General: No scleral icterus.     Conjunctiva/sclera: Conjunctivae normal.      Pupils: Pupils are equal, round, and reactive to light.   Neck:      Vascular: No JVD.   Cardiovascular:      Rate and Rhythm: Normal rate and regular rhythm.      Heart sounds: Normal heart sounds. No murmur heard.     No friction rub. No gallop.   Pulmonary:      Effort: Pulmonary effort is normal. No respiratory distress.      Breath sounds: Normal breath sounds. No wheezing or rales.   Abdominal:      General: Bowel sounds are normal. There is no distension.      Palpations: Abdomen is soft.      Tenderness: There is no abdominal tenderness. There is no guarding or rebound.   Musculoskeletal:          "General: Tenderness and signs of injury present. Normal range of motion.      Cervical back: Normal range of motion and neck supple.      Comments:   Right leg shortened and rotated outward   Lymphadenopathy:      Cervical: No cervical adenopathy.   Skin:     General: Skin is warm and dry.      Capillary Refill: Capillary refill takes less than 2 seconds.      Coloration: Skin is not pale.      Findings: No erythema or rash.   Neurological:      Mental Status: She is alert and oriented to person, place, and time.      Cranial Nerves: No cranial nerve deficit.      Sensory: No sensory deficit.      Coordination: Coordination normal.      Deep Tendon Reflexes: Reflexes normal.   Psychiatric:         Behavior: Behavior normal.         Thought Content: Thought content normal.         Judgment: Judgment normal.              CRANIAL NERVES     CN III, IV, VI   Pupils are equal, round, and reactive to light.       Significant Labs: All pertinent labs within the past 24 hours have been reviewed.    Significant Imaging: I have reviewed all pertinent imaging results/findings within the past 24 hours.  Assessment/Plan:     * Closed displaced intertrochanteric fracture of right femur   Acute problem   NPO after midnight   Ortho consulted   Plans for OR repair in a.m.  Oxycodone p.r.n. moderate pain   Hydromorphone p.r.n. severe pain   Zofran p.r.n. nausea vomiting   PT/OT consult when appropriate         Type 2 diabetes mellitus, without long-term current use of insulin  Chronic   Patient's FSGs are controlled on current medication regimen.  Last A1c reviewed- No results found for: "LABA1C", "HGBA1C"  Most recent fingerstick glucose reviewed- No results for input(s): "POCTGLUCOSE" in the last 24 hours.  Current correctional scale  Medium  Maintain anti-hyperglycemic dose as follows-   Antihyperglycemics (From admission, onward)      Start     Stop Route Frequency Ordered    12/23/24 1802  insulin aspart U-100 pen 1-10 Units     "     -- SubQ Before meals & nightly PRN 12/23/24 1703          Hold Oral hypoglycemics while patient is in the hospital.       Essential hypertension  Patient's blood pressure range in the last 24 hours was: BP  Min: 136/59  Max: 142/98.The patient's inpatient anti-hypertensive regimen is listed below:  Current Antihypertensives  amLODIPine tablet 10 mg, Daily, Oral  atenoloL tablet 25 mg, 2 times daily, Oral    Plan  - BP is controlled, no changes needed to their regimen  -       VTE Risk Mitigation (From admission, onward)           Ordered     IP VTE HIGH RISK PATIENT  Once         12/23/24 1703     Place sequential compression device  Until discontinued         12/23/24 1703     Place JIMMY hose  Until discontinued         12/23/24 1703                                    John Maharaj NP  Department of Hospital Medicine  [facility]/Surg

## 2024-12-23 NOTE — ASSESSMENT & PLAN NOTE
"Chronic   Patient's FSGs are controlled on current medication regimen.  Last A1c reviewed- No results found for: "LABA1C", "HGBA1C"  Most recent fingerstick glucose reviewed- No results for input(s): "POCTGLUCOSE" in the last 24 hours.  Current correctional scale  Medium  Maintain anti-hyperglycemic dose as follows-   Antihyperglycemics (From admission, onward)      Start     Stop Route Frequency Ordered    12/23/24 1802  insulin aspart U-100 pen 1-10 Units         -- SubQ Before meals & nightly PRN 12/23/24 1703          Hold Oral hypoglycemics while patient is in the hospital.     "

## 2024-12-23 NOTE — NURSING
Pt arrived to floor via [facility] from [facility]. Pt AAOx3. NAD noted. Lag[place] in place. IV intact. Educated pt on bed controls, safety, call light. Pt verbalized understanding. John Maharaj NP updated on pt's arrival to floor. Primary Nurse Iain CHAMPION updated.

## 2024-12-23 NOTE — ASSESSMENT & PLAN NOTE
Patient's blood pressure range in the last 24 hours was: BP  Min: 136/59  Max: 142/98.The patient's inpatient anti-hypertensive regimen is listed below:  Current Antihypertensives  amLODIPine tablet 10 mg, Daily, Oral  atenoloL tablet 25 mg, 2 times daily, Oral    Plan  - BP is controlled, no changes needed to their regimen  -

## 2024-12-24 ENCOUNTER — ANESTHESIA (OUTPATIENT)
Dept: SURGERY | Facility: HOSPITAL | Age: 64
End: 2024-12-24
Payer: COMMERCIAL

## 2024-12-24 ENCOUNTER — ANESTHESIA EVENT (OUTPATIENT)
Dept: SURGERY | Facility: HOSPITAL | Age: 64
End: 2024-12-24
Payer: COMMERCIAL

## 2024-12-24 LAB
ALBUMIN SERPL BCP-MCNC: 3.2 G/DL (ref 3.5–5.2)
ALP SERPL-CCNC: 67 U/L (ref 40–150)
ALT SERPL W/O P-5'-P-CCNC: 9 U/L (ref 10–44)
ANION GAP SERPL CALC-SCNC: 6 MMOL/L (ref 8–16)
AST SERPL-CCNC: 14 U/L (ref 10–40)
BASOPHILS # BLD AUTO: 0.03 K/UL (ref 0–0.2)
BASOPHILS NFR BLD: 0.4 % (ref 0–1.9)
BILIRUB SERPL-MCNC: 0.7 MG/DL (ref 0.1–1)
BUN SERPL-MCNC: 9 MG/DL (ref 8–23)
CALCIUM SERPL-MCNC: 8.5 MG/DL (ref 8.7–10.5)
CHLORIDE SERPL-SCNC: 106 MMOL/L (ref 95–110)
CO2 SERPL-SCNC: 24 MMOL/L (ref 23–29)
CREAT SERPL-MCNC: 0.8 MG/DL (ref 0.5–1.4)
DIFFERENTIAL METHOD BLD: ABNORMAL
EOSINOPHIL # BLD AUTO: 0.1 K/UL (ref 0–0.5)
EOSINOPHIL NFR BLD: 1.3 % (ref 0–8)
ERYTHROCYTE [DISTWIDTH] IN BLOOD BY AUTOMATED COUNT: 13.2 % (ref 11.5–14.5)
EST. GFR  (NO RACE VARIABLE): >60 ML/MIN/1.73 M^2
GLUCOSE SERPL-MCNC: 170 MG/DL (ref 70–110)
HCT VFR BLD AUTO: 32.3 % (ref 37–48.5)
HGB BLD-MCNC: 10 G/DL (ref 12–16)
IMM GRANULOCYTES # BLD AUTO: 0.03 K/UL (ref 0–0.04)
IMM GRANULOCYTES NFR BLD AUTO: 0.4 % (ref 0–0.5)
LYMPHOCYTES # BLD AUTO: 1.7 K/UL (ref 1–4.8)
LYMPHOCYTES NFR BLD: 20.7 % (ref 18–48)
MAGNESIUM SERPL-MCNC: 1.9 MG/DL (ref 1.6–2.6)
MCH RBC QN AUTO: 29.5 PG (ref 27–31)
MCHC RBC AUTO-ENTMCNC: 31 G/DL (ref 32–36)
MCV RBC AUTO: 95 FL (ref 82–98)
MONOCYTES # BLD AUTO: 0.8 K/UL (ref 0.3–1)
MONOCYTES NFR BLD: 10.5 % (ref 4–15)
NEUTROPHILS # BLD AUTO: 5.3 K/UL (ref 1.8–7.7)
NEUTROPHILS NFR BLD: 66.7 % (ref 38–73)
NRBC BLD-RTO: 0 /100 WBC
PHOSPHATE SERPL-MCNC: 3.3 MG/DL (ref 2.7–4.5)
PLATELET # BLD AUTO: 151 K/UL (ref 150–450)
PMV BLD AUTO: 11 FL (ref 9.2–12.9)
POCT GLUCOSE: 151 MG/DL (ref 70–110)
POCT GLUCOSE: 158 MG/DL (ref 70–110)
POCT GLUCOSE: 174 MG/DL (ref 70–110)
POTASSIUM SERPL-SCNC: 3.9 MMOL/L (ref 3.5–5.1)
PROT SERPL-MCNC: 5.6 G/DL (ref 6–8.4)
RBC # BLD AUTO: 3.39 M/UL (ref 4–5.4)
SODIUM SERPL-SCNC: 136 MMOL/L (ref 136–145)
WBC # BLD AUTO: 7.98 K/UL (ref 3.9–12.7)

## 2024-12-24 PROCEDURE — C1769 GUIDE WIRE: HCPCS | Performed by: ORTHOPAEDIC SURGERY

## 2024-12-24 PROCEDURE — 63600175 PHARM REV CODE 636 W HCPCS: Performed by: STUDENT IN AN ORGANIZED HEALTH CARE EDUCATION/TRAINING PROGRAM

## 2024-12-24 PROCEDURE — 63600175 PHARM REV CODE 636 W HCPCS: Performed by: NURSE PRACTITIONER

## 2024-12-24 PROCEDURE — 80053 COMPREHEN METABOLIC PANEL: CPT | Performed by: NURSE PRACTITIONER

## 2024-12-24 PROCEDURE — 71000033 HC RECOVERY, INTIAL HOUR: Performed by: ORTHOPAEDIC SURGERY

## 2024-12-24 PROCEDURE — C1713 ANCHOR/SCREW BN/BN,TIS/BN: HCPCS | Performed by: ORTHOPAEDIC SURGERY

## 2024-12-24 PROCEDURE — 27000221 HC OXYGEN, UP TO 24 HOURS

## 2024-12-24 PROCEDURE — 85025 COMPLETE CBC W/AUTO DIFF WBC: CPT | Performed by: NURSE PRACTITIONER

## 2024-12-24 PROCEDURE — 37000009 HC ANESTHESIA EA ADD 15 MINS: Performed by: ORTHOPAEDIC SURGERY

## 2024-12-24 PROCEDURE — 71000039 HC RECOVERY, EACH ADD'L HOUR: Performed by: ORTHOPAEDIC SURGERY

## 2024-12-24 PROCEDURE — 83735 ASSAY OF MAGNESIUM: CPT | Performed by: NURSE PRACTITIONER

## 2024-12-24 PROCEDURE — 37000008 HC ANESTHESIA 1ST 15 MINUTES: Performed by: ORTHOPAEDIC SURGERY

## 2024-12-24 PROCEDURE — 11000001 HC ACUTE MED/SURG PRIVATE ROOM

## 2024-12-24 PROCEDURE — 25000003 PHARM REV CODE 250: Performed by: STUDENT IN AN ORGANIZED HEALTH CARE EDUCATION/TRAINING PROGRAM

## 2024-12-24 PROCEDURE — 25000003 PHARM REV CODE 250: Performed by: NURSE PRACTITIONER

## 2024-12-24 PROCEDURE — 99900035 HC TECH TIME PER 15 MIN (STAT)

## 2024-12-24 PROCEDURE — 0QS636Z REPOSITION RIGHT UPPER FEMUR WITH INTRAMEDULLARY INTERNAL FIXATION DEVICE, PERCUTANEOUS APPROACH: ICD-10-PCS | Performed by: ORTHOPAEDIC SURGERY

## 2024-12-24 PROCEDURE — 94799 UNLISTED PULMONARY SVC/PX: CPT

## 2024-12-24 PROCEDURE — 27201423 OPTIME MED/SURG SUP & DEVICES STERILE SUPPLY: Performed by: ORTHOPAEDIC SURGERY

## 2024-12-24 PROCEDURE — 84100 ASSAY OF PHOSPHORUS: CPT | Performed by: NURSE PRACTITIONER

## 2024-12-24 PROCEDURE — 27245 TREAT THIGH FRACTURE: CPT | Mod: RT,,, | Performed by: ORTHOPAEDIC SURGERY

## 2024-12-24 PROCEDURE — 36415 COLL VENOUS BLD VENIPUNCTURE: CPT | Performed by: NURSE PRACTITIONER

## 2024-12-24 PROCEDURE — 36000711: Performed by: ORTHOPAEDIC SURGERY

## 2024-12-24 PROCEDURE — 36000710: Performed by: ORTHOPAEDIC SURGERY

## 2024-12-24 PROCEDURE — 94761 N-INVAS EAR/PLS OXIMETRY MLT: CPT

## 2024-12-24 PROCEDURE — 25000003 PHARM REV CODE 250: Performed by: ANESTHESIOLOGY

## 2024-12-24 PROCEDURE — 63600175 PHARM REV CODE 636 W HCPCS: Performed by: ANESTHESIOLOGY

## 2024-12-24 PROCEDURE — 63600175 PHARM REV CODE 636 W HCPCS: Performed by: NURSE ANESTHETIST, CERTIFIED REGISTERED

## 2024-12-24 PROCEDURE — 63600175 PHARM REV CODE 636 W HCPCS: Performed by: ORTHOPAEDIC SURGERY

## 2024-12-24 PROCEDURE — 25000003 PHARM REV CODE 250: Performed by: NURSE ANESTHETIST, CERTIFIED REGISTERED

## 2024-12-24 PROCEDURE — 25000003 PHARM REV CODE 250: Performed by: ORTHOPAEDIC SURGERY

## 2024-12-24 DEVICE — NAIL TFN-ADV R 130DEG 10X380MM: Type: IMPLANTABLE DEVICE | Site: FEMUR | Status: FUNCTIONAL

## 2024-12-24 DEVICE — SCREW TFN ADVANCE 100MM: Type: IMPLANTABLE DEVICE | Site: FEMUR | Status: FUNCTIONAL

## 2024-12-24 DEVICE — SCREW XL25 IM NAIL LOK 5X54MM: Type: IMPLANTABLE DEVICE | Site: FEMUR | Status: FUNCTIONAL

## 2024-12-24 RX ORDER — HYDROMORPHONE HYDROCHLORIDE 2 MG/ML
0.2 INJECTION, SOLUTION INTRAMUSCULAR; INTRAVENOUS; SUBCUTANEOUS EVERY 5 MIN PRN
Status: DISCONTINUED | OUTPATIENT
Start: 2024-12-24 | End: 2024-12-24

## 2024-12-24 RX ORDER — ACETAMINOPHEN 325 MG/1
650 TABLET ORAL EVERY 6 HOURS PRN
Status: DISCONTINUED | OUTPATIENT
Start: 2024-12-24 | End: 2024-12-30 | Stop reason: HOSPADM

## 2024-12-24 RX ORDER — CEFAZOLIN SODIUM 2 G/50ML
2 SOLUTION INTRAVENOUS ONCE
Status: DISCONTINUED | OUTPATIENT
Start: 2024-12-24 | End: 2024-12-24 | Stop reason: SDUPTHER

## 2024-12-24 RX ORDER — ACETAMINOPHEN 10 MG/ML
INJECTION, SOLUTION INTRAVENOUS
Status: DISCONTINUED | OUTPATIENT
Start: 2024-12-24 | End: 2024-12-24

## 2024-12-24 RX ORDER — EPHEDRINE SULFATE 50 MG/ML
INJECTION, SOLUTION INTRAVENOUS
Status: DISCONTINUED | OUTPATIENT
Start: 2024-12-24 | End: 2024-12-24

## 2024-12-24 RX ORDER — PROPOFOL 10 MG/ML
VIAL (ML) INTRAVENOUS
Status: DISCONTINUED | OUTPATIENT
Start: 2024-12-24 | End: 2024-12-24

## 2024-12-24 RX ORDER — FENTANYL CITRATE 50 UG/ML
INJECTION, SOLUTION INTRAMUSCULAR; INTRAVENOUS
Status: DISCONTINUED | OUTPATIENT
Start: 2024-12-24 | End: 2024-12-24

## 2024-12-24 RX ORDER — MIDAZOLAM HYDROCHLORIDE 1 MG/ML
INJECTION INTRAMUSCULAR; INTRAVENOUS
Status: DISCONTINUED | OUTPATIENT
Start: 2024-12-24 | End: 2024-12-24

## 2024-12-24 RX ORDER — LIDOCAINE HYDROCHLORIDE 20 MG/ML
INJECTION INTRAVENOUS
Status: DISCONTINUED | OUTPATIENT
Start: 2024-12-24 | End: 2024-12-24

## 2024-12-24 RX ORDER — ONDANSETRON HYDROCHLORIDE 2 MG/ML
4 INJECTION, SOLUTION INTRAVENOUS DAILY PRN
Status: DISCONTINUED | OUTPATIENT
Start: 2024-12-24 | End: 2024-12-24

## 2024-12-24 RX ORDER — OXYCODONE AND ACETAMINOPHEN 5; 325 MG/1; MG/1
1 TABLET ORAL EVERY 4 HOURS PRN
Status: DISCONTINUED | OUTPATIENT
Start: 2024-12-24 | End: 2024-12-30 | Stop reason: HOSPADM

## 2024-12-24 RX ORDER — SODIUM CHLORIDE 0.9 % (FLUSH) 0.9 %
3 SYRINGE (ML) INJECTION
Status: DISCONTINUED | OUTPATIENT
Start: 2024-12-24 | End: 2024-12-24

## 2024-12-24 RX ORDER — HYDROMORPHONE HYDROCHLORIDE 1 MG/ML
0.5 INJECTION, SOLUTION INTRAMUSCULAR; INTRAVENOUS; SUBCUTANEOUS EVERY 4 HOURS PRN
Status: DISCONTINUED | OUTPATIENT
Start: 2024-12-24 | End: 2024-12-30 | Stop reason: HOSPADM

## 2024-12-24 RX ORDER — ENOXAPARIN SODIUM 100 MG/ML
40 INJECTION SUBCUTANEOUS EVERY 24 HOURS
Status: DISCONTINUED | OUTPATIENT
Start: 2024-12-25 | End: 2024-12-27

## 2024-12-24 RX ORDER — PHENYLEPHRINE HYDROCHLORIDE 10 MG/ML
INJECTION INTRAVENOUS
Status: DISCONTINUED | OUTPATIENT
Start: 2024-12-24 | End: 2024-12-24

## 2024-12-24 RX ORDER — FENTANYL CITRATE 50 UG/ML
25 INJECTION, SOLUTION INTRAMUSCULAR; INTRAVENOUS EVERY 5 MIN PRN
Status: DISCONTINUED | OUTPATIENT
Start: 2024-12-24 | End: 2024-12-24

## 2024-12-24 RX ORDER — OXYCODONE HYDROCHLORIDE 5 MG/1
5 TABLET ORAL
Status: DISCONTINUED | OUTPATIENT
Start: 2024-12-24 | End: 2024-12-24

## 2024-12-24 RX ORDER — CEFAZOLIN 2 G/1
2 INJECTION, POWDER, FOR SOLUTION INTRAMUSCULAR; INTRAVENOUS ONCE
Status: COMPLETED | OUTPATIENT
Start: 2024-12-24 | End: 2024-12-24

## 2024-12-24 RX ORDER — ONDANSETRON HYDROCHLORIDE 2 MG/ML
INJECTION, SOLUTION INTRAMUSCULAR; INTRAVENOUS
Status: DISCONTINUED | OUTPATIENT
Start: 2024-12-24 | End: 2024-12-24

## 2024-12-24 RX ADMIN — ACETAMINOPHEN 1000 MG: 10 INJECTION INTRAVENOUS at 07:12

## 2024-12-24 RX ADMIN — EPHEDRINE SULFATE 25 MG: 50 INJECTION, SOLUTION INTRAMUSCULAR; INTRAVENOUS; SUBCUTANEOUS at 08:12

## 2024-12-24 RX ADMIN — ONDANSETRON 4 MG: 2 INJECTION INTRAMUSCULAR; INTRAVENOUS at 07:12

## 2024-12-24 RX ADMIN — OXYCODONE 5 MG: 5 TABLET ORAL at 09:12

## 2024-12-24 RX ADMIN — HYDROMORPHONE HYDROCHLORIDE 1 MG: 1 INJECTION, SOLUTION INTRAMUSCULAR; INTRAVENOUS; SUBCUTANEOUS at 03:12

## 2024-12-24 RX ADMIN — CEFAZOLIN 2 G: 2 INJECTION, POWDER, FOR SOLUTION INTRAMUSCULAR; INTRAVENOUS at 07:12

## 2024-12-24 RX ADMIN — PHENYLEPHRINE HYDROCHLORIDE 200 MCG: 10 INJECTION INTRAVENOUS at 07:12

## 2024-12-24 RX ADMIN — HYDROMORPHONE HYDROCHLORIDE 0.5 MG: 1 INJECTION, SOLUTION INTRAMUSCULAR; INTRAVENOUS; SUBCUTANEOUS at 10:12

## 2024-12-24 RX ADMIN — MUPIROCIN 1 G: 20 OINTMENT TOPICAL at 08:12

## 2024-12-24 RX ADMIN — PHENYLEPHRINE HYDROCHLORIDE 100 MCG: 10 INJECTION INTRAVENOUS at 07:12

## 2024-12-24 RX ADMIN — PROPOFOL 140 MG: 10 INJECTION, EMULSION INTRAVENOUS at 07:12

## 2024-12-24 RX ADMIN — PHENYLEPHRINE HYDROCHLORIDE 200 MCG: 10 INJECTION INTRAVENOUS at 08:12

## 2024-12-24 RX ADMIN — FENTANYL CITRATE 50 MCG: 50 INJECTION, SOLUTION INTRAMUSCULAR; INTRAVENOUS at 07:12

## 2024-12-24 RX ADMIN — SODIUM CHLORIDE, SODIUM GLUCONATE, SODIUM ACETATE, POTASSIUM CHLORIDE AND MAGNESIUM CHLORIDE: 526; 502; 368; 37; 30 INJECTION, SOLUTION INTRAVENOUS at 06:12

## 2024-12-24 RX ADMIN — EPHEDRINE SULFATE 25 MG: 50 INJECTION, SOLUTION INTRAMUSCULAR; INTRAVENOUS; SUBCUTANEOUS at 07:12

## 2024-12-24 RX ADMIN — SODIUM CHLORIDE, SODIUM GLUCONATE, SODIUM ACETATE, POTASSIUM CHLORIDE AND MAGNESIUM CHLORIDE: 526; 502; 368; 37; 30 INJECTION, SOLUTION INTRAVENOUS at 08:12

## 2024-12-24 RX ADMIN — FENTANYL CITRATE 25 MCG: 50 INJECTION, SOLUTION INTRAMUSCULAR; INTRAVENOUS at 09:12

## 2024-12-24 RX ADMIN — OXYCODONE HYDROCHLORIDE AND ACETAMINOPHEN 1 TABLET: 5; 325 TABLET ORAL at 07:12

## 2024-12-24 RX ADMIN — LIDOCAINE HYDROCHLORIDE 75 MG: 20 INJECTION, SOLUTION INTRAVENOUS at 07:12

## 2024-12-24 RX ADMIN — ALUMINUM HYDROXIDE, MAGNESIUM HYDROXIDE, AND SIMETHICONE 30 ML: 1200; 120; 1200 SUSPENSION ORAL at 11:12

## 2024-12-24 RX ADMIN — OXYCODONE HYDROCHLORIDE AND ACETAMINOPHEN 1 TABLET: 5; 325 TABLET ORAL at 02:12

## 2024-12-24 RX ADMIN — SIMETHICONE 80 MG: 80 TABLET, CHEWABLE ORAL at 11:12

## 2024-12-24 RX ADMIN — HYDROMORPHONE HYDROCHLORIDE 0.5 MG: 1 INJECTION, SOLUTION INTRAMUSCULAR; INTRAVENOUS; SUBCUTANEOUS at 05:12

## 2024-12-24 RX ADMIN — MIDAZOLAM HYDROCHLORIDE 2 MG: 1 INJECTION, SOLUTION INTRAMUSCULAR; INTRAVENOUS at 07:12

## 2024-12-24 NOTE — CONSULTS
[facility]/Surg  Adult Nutrition  Consult Note    SUMMARY     Recommendations    1. Recommend to advance to a diabetic, consistent carbohydrate modified diet when appropriate    2. Recommend protein supplement: Beneprotein or ProMod BID, once NPO status advances to promote post op healing    3. Monitor labs and weights    Goals:  Patient to advance with nutrition therapy, oral diet prior to 48 hours    Nutrition Goal Status: new  Communication of RD Recs: other (comment) (consult, poc)    Assessment and Plan    Nutrition Problem  Inadequate oral intake     Related to (etiology):   NPO status due to closed displaced intertrochanteric fracture of right femur    Signs and Symptoms (as evidenced by):  Surgery       Interventions (treatment strategy):  Consistent Carbohydrate modified diet   Modified beverage medical food supplement therapy     Nutrition Diagnosis Status:   New         Malnutrition Assessment           Patient does not meet positive criteria of NFPE at this time.  RD to continue to monitor for nutrition risks at follow up visits.                             Reason for Assessment    Reason For Assessment: consult (Malnutrition consult)    Diagnosis:  (closed displaced intertrochanteric fracture of right femur)  Patient Active Problem List   Diagnosis    Acute pain of right shoulder    Asthma    Chronic interstitial cystitis    Essential hypertension    Gastroesophageal reflux disease without esophagitis    Microscopic hematuria    MVP (mitral valve prolapse)    Perennial non-allergic rhinitis    Pulmonary nodule    Type 2 diabetes mellitus, without long-term current use of insulin    Hypoxemia requiring supplemental oxygen    Closed displaced intertrochanteric fracture of right femur     Past Medical History:   Diagnosis Date    Acid reflux     Allergy     Diabetes mellitus, type 2     Hypertension        General Information Comments:   12/24/2024: Patient is currenly NPO for surgical  "procedure of closed displaced intertrochanteric fracture of right femur.  PMH of Diabetes.  RD recommends diet advancement when appropriate to a consistent carbohydrate, diabetic oral diet.  Protein supplement recommended to promote post op healing.  Labs reviewed.  NKFA.  LBM: 12/23/2024.  No significant weight loss reported or found in medical chart weight history.  Patient does not meet positive criteria of NFPE at this time.  RD to continue to monitor NPO status, diet advancement and po intake tolerance.    Nutrition Discharge Planning: Consistent carbohydrate modified diet    Nutrition Risk Screen     Nutrition Related Social Determinants of Health: SDOH: None Identified      Nutrition/Diet History    Spiritual, Cultural Beliefs, Druze Practices, Values that Affect Care: no  Food Allergies: NKFA  Factors Affecting Nutritional Intake: NPO    Anthropometrics    Temp: 98 °F (36.7 °C)  Height Method: Stated  Height: 5' 6" (167.6 cm)  Height (inches): 66 in  Weight Method: Bed Scale  Weight: 78.4 kg (172 lb 13.5 oz)  Weight (lb): 172.84 lb  Ideal Body Weight (IBW), Female: 130 lb  % Ideal Body Weight, Female (lb): 132.95 %  BMI (Calculated): 27.9  BMI Grade: 25 - 29.9 - overweight       Lab/Procedures/Meds    Pertinent Labs Reviewed: reviewed  BMP  Lab Results   Component Value Date     12/24/2024    K 3.9 12/24/2024     12/24/2024    CO2 24 12/24/2024    BUN 9 12/24/2024    CREATININE 0.8 12/24/2024    CALCIUM 8.5 (L) 12/24/2024    ANIONGAP 6 (L) 12/24/2024    EGFRNORACEVR >60 12/24/2024     No results found for: "LABA1C", "HGBA1C"  Lab Results   Component Value Date    CALCIUM 8.5 (L) 12/24/2024    PHOS 3.3 12/24/2024     Glucose   Date Value Ref Range Status   12/24/2024 170 (H) 70 - 110 mg/dL Final         Pertinent Medications Reviewed: reviewed  Scheduled Meds:   amLODIPine  10 mg Oral Daily    atenoloL  25 mg Oral BID    [START ON 12/25/2024] enoxparin  40 mg Subcutaneous Q24H (prophylaxis, " 1700)    mupirocin   Nasal BID    pantoprazole  40 mg Oral Daily     Continuous Infusions:  PRN Meds:.  Current Facility-Administered Medications:     acetaminophen, 650 mg, Oral, Q4H PRN    acetaminophen, 650 mg, Oral, Q6H PRN    albuterol-ipratropium, 3 mL, Nebulization, Q4H PRN    aluminum-magnesium hydroxide-simethicone, 30 mL, Oral, QID PRN    dextrose 10%, 12.5 g, Intravenous, PRN    dextrose 10%, 25 g, Intravenous, PRN    fentaNYL, 25 mcg, Intravenous, Q5 Min PRN    glucagon (human recombinant), 1 mg, Intramuscular, PRN    glucose, 16 g, Oral, PRN    glucose, 24 g, Oral, PRN    HYDROmorphone, 0.2 mg, Intravenous, Q5 Min PRN    HYDROmorphone, 1 mg, Intravenous, Q4H PRN    insulin aspart U-100, 1-10 Units, Subcutaneous, QID (AC + HS) PRN    magnesium oxide, 800 mg, Oral, PRN    magnesium oxide, 800 mg, Oral, PRN    melatonin, 9 mg, Oral, Nightly PRN    naloxone, 0.02 mg, Intravenous, PRN    ondansetron, 4 mg, Intravenous, Q8H PRN    ondansetron, 4 mg, Intravenous, Daily PRN    oxyCODONE, 5 mg, Oral, Q6H PRN    oxyCODONE, 5 mg, Oral, Q3H PRN    potassium bicarbonate, 35 mEq, Oral, PRN    potassium bicarbonate, 50 mEq, Oral, PRN    potassium bicarbonate, 60 mEq, Oral, PRN    potassium, sodium phosphates, 2 packet, Oral, PRN    potassium, sodium phosphates, 2 packet, Oral, PRN    potassium, sodium phosphates, 2 packet, Oral, PRN    simethicone, 1 tablet, Oral, QID PRN    sodium chloride 0.9%, 10 mL, Intravenous, Q8H PRN    sodium chloride 0.9%, 3 mL, Intravenous, PRN    Physical Findings/Assessment         Estimated/Assessed Needs    Weight Used For Calorie Calculations: 78.4 kg (172 lb 13.5 oz)  Energy Calorie Requirements (kcal): 25kcals/kg (1960kcals/day)  Energy Need Method: Kcal/kg  Protein Requirements: 1.2-1.5g/kg (94-117g/day)  Weight Used For Protein Calculations: 78.4 kg (172 lb 13.5 oz)  Fluid Requirements (mL): 1ml/kcal  Estimated Fluid Requirement Method: RDA Method  RDA Method (mL): 25  CHO  Requirement: 250      Nutrition Prescription Ordered    Current Diet Order: NPO  Oral Nutrition Supplement: Recommend protein supplement BID once NPO status advances    Evaluation of Received Nutrient/Fluid Intake    % Kcal Needs: NPO  % Protein Needs: NPO  I/O: 12/24/24: +840mL since admit  Energy Calories Required: not meeting needs  Protein Required: not meeting needs  Fluid Required: not meeting needs  Comments: LBM: 12/23/2024  Tolerance: other (see comments) (NPO)  % Intake of Estimated Energy Needs: Other: NPO  % Meal Intake: NPO    Nutrition Risk    Level of Risk/Frequency of Follow-up: moderate (Follow up: 1-2x per week)       Monitor and Evaluation    Food and Nutrient Intake: energy intake, food and beverage intake  Food and Nutrient Adminstration: diet order  Knowledge/Beliefs/Attitudes: beliefs and attitudes  Physical Activity and Function: factors affecting access to physical activity  Anthropometric Measurements: body mass index, weight change, weight, height/length  Biochemical Data, Medical Tests and Procedures: lipid profile, inflammatory profile, glucose/endocrine profile, gastrointestinal profile, electrolyte and renal panel  Nutrition-Focused Physical Findings: extremities, muscles and bones       Nutrition Follow-Up    RD Follow-up?: Yes  Bebe Valenzuela, MS, RDN, LDN

## 2024-12-24 NOTE — PLAN OF CARE
[facility]/Surg  Initial Discharge Assessment       Primary Care Provider: Brandyn Alvares IV, MD    Admission Diagnosis: Hip fracture [S72.009A]    Admission Date: 12/23/2024  Expected Discharge Date: 12/27/2024    Met with pt at bedside to complete discharge assessment, verified PCP, pharmacy and information on facesheet.  No HH, dialysis or Coumadin.  See DME below.  SonGustavo in room and will provide transportation home.  Discharge plan to be determined.    Transition of Care Barriers: None    Payor: BLUE CROSS [facility] / Plan: [facility] [employer]OYEES / Product Type: Commercial /     Extended Emergency Contact Information  Primary Emergency Contact: Gustavo Hoover  Home Phone: [phone]  Mobile Phone: [phone]  Relation: Son  Preferred language: English   needed? No  Secondary Emergency Contact: Stella Hoover  Address: [place] [address]            [place] [address] [facility]  Home Phone: [phone]  Mobile Phone: [phone]  Relation: Daughter    Discharge Plan A: Home Health  Discharge Plan B: Home with family      [id number] - [place], [place] - [address] AT [facility] [address]  [address]  [place] [address]  Phone: [phone] Fax: [phone]      Initial Assessment (most recent)       Adult Discharge Assessment - 12/24/24 1259          Discharge Assessment    Assessment Type Discharge Planning Assessment     Confirmed/corrected address, phone number and insurance Yes     Confirmed Demographics Correct on Facesheet     Source of Information patient     Communicated PETER with patient/caregiver No     People in Home parent(s);child(constantine), adult     Do you expect to return to your current living situation? Yes     Prior to hospitilization cognitive status: Alert/Oriented     Current cognitive status: Alert/Oriented     Walking or Climbing Stairs Difficulty no     Dressing/Bathing Difficulty no      Equipment Currently Used at Home walker, rolling;wheelchair     Readmission within 30 days? No     Patient currently being followed by outpatient case management? No     Do you currently have service(s) that help you manage your care at home? No     Do you take prescription medications? Yes     Do you have prescription coverage? Yes     Coverage BCBS     Do you have any problems affording any of your prescribed medications? No     Is the patient taking medications as prescribed? yes     Who is going to help you get home at discharge? son, Gustavo     How do you get to doctors appointments? car, drives self     Are you on dialysis? No     Do you take coumadin? No     Discharge Plan A Home Health     Discharge Plan B Home with family     Transition of Care Barriers None

## 2024-12-24 NOTE — CARE UPDATE
12/23/24 1915   Patient Assessment/Suction   Level of Consciousness (AVPU) alert   Respiratory Effort Unlabored   Expansion/Accessory Muscles/Retractions no use of accessory muscles;no retractions   PRE-TX-O2   Device (Oxygen Therapy) room air   SpO2 96 %   Pulse Oximetry Type Intermittent   $ Pulse Oximetry - Multiple Charge Pulse Oximetry - Multiple   Pulse 82   Resp 17   Aerosol Therapy   $ Aerosol Therapy Charges PRN treatment not required

## 2024-12-24 NOTE — TRANSFER OF CARE
"Anesthesia Transfer of Care Note    Patient: Doris Hoover    Procedure(s) Performed: Procedure(s) (LRB):  INSERTION, INTRAMEDULLARY MIKEL, FEMUR, TROCHANTER (Right)    Patient location: PACU    Anesthesia Type: general    Transport from OR: Transported from OR on 2-3 L/min O2 by NC with adequate spontaneous ventilation    Post pain: adequate analgesia    Post assessment: no apparent anesthetic complications and tolerated procedure well    Post vital signs: stable    Level of consciousness: awake, alert and oriented    Nausea/Vomiting: no nausea/vomiting    Complications: none    Transfer of care protocol was followed      Last vitals: Visit Vitals  /67 (BP Location: Right arm, Patient Position: Lying)   Pulse 69   Temp 36.9 °C (98.4 °F) (Skin)   Resp 18   Ht 5' 6" (1.676 m)   Wt 78.4 kg (172 lb 13.5 oz)   SpO2 95%   Breastfeeding No   BMI 27.90 kg/m²     "

## 2024-12-24 NOTE — ANESTHESIA POSTPROCEDURE EVALUATION
Anesthesia Post Evaluation    Patient: Doris Hoover    Procedure(s) Performed: Procedure(s) (LRB):  INSERTION, INTRAMEDULLARY MIKEL, FEMUR, TROCHANTER (Right)    Final Anesthesia Type: general      Patient location during evaluation: PACU  Patient participation: Yes- Able to Participate  Level of consciousness: awake  Post-procedure vital signs: reviewed and stable  Pain management: adequate  Airway patency: patent    PONV status at discharge: No PONV  Anesthetic complications: no      Cardiovascular status: blood pressure returned to baseline  Respiratory status: unassisted  Hydration status: euvolemic  Follow-up not needed.              Vitals Value Taken Time   BP 98/54 12/24/24 1201   Temp 36.6 °C (97.8 °F) 12/24/24 1201   Pulse 69 12/24/24 1201   Resp 14 12/24/24 1201   SpO2 93 % 12/24/24 1201         Event Time   Out of Recovery 12/24/2024 09:50:00         Pain/Treasure Score: Pain Rating Prior to Med Admin: 6 (12/24/2024  9:43 AM)  Pain Rating Post Med Admin: 5 (12/24/2024 10:00 AM)  Treasure Score: 8 (12/24/2024 10:00 AM)

## 2024-12-24 NOTE — SUBJECTIVE & OBJECTIVE
Interval History:  Seen on a.m. rounds.  She is postop right femoral fracture repair this morning with intramedullary nailing.  Mother is bedside.  No complaints.      Objective:     Vital Signs (Most Recent):  Temp: 97.8 °F (36.6 °C) (12/24/24 1201)  Pulse: 69 (12/24/24 1201)  Resp: 14 (12/24/24 1201)  BP: (!) 105/58 (12/24/24 1354)  SpO2: (!) 94 % (12/24/24 1319) Vital Signs (24h Range):  Temp:  [97.5 °F (36.4 °C)-98.6 °F (37 °C)] 97.8 °F (36.6 °C)  Pulse:  [64-82] 69  Resp:  [0-26] 14  SpO2:  [89 %-97 %] 94 %  BP: ()/(53-67) 105/58     Weight: 78.4 kg (172 lb 13.5 oz)  Body mass index is 27.9 kg/m².    Intake/Output Summary (Last 24 hours) at 12/24/2024 1419  Last data filed at 12/24/2024 0828  Gross per 24 hour   Intake 1340 ml   Output 500 ml   Net 840 ml         Physical Exam  Vitals reviewed.   Constitutional:       General: She is not in acute distress.  HENT:      Head: Normocephalic and atraumatic.   Cardiovascular:      Rate and Rhythm: Normal rate and regular rhythm.   Pulmonary:      Effort: Pulmonary effort is normal. No respiratory distress.   Musculoskeletal:      Comments: Three dressings to the right lateral thigh C/D/I  RLE NVI   Neurological:      General: No focal deficit present.      Mental Status: She is alert and oriented to person, place, and time. Mental status is at baseline.   Psychiatric:         Mood and Affect: Affect normal.         Behavior: Behavior normal.         Thought Content: Thought content normal.             Significant Labs: All pertinent labs within the past 24 hours have been reviewed.    Significant Imaging: I have reviewed all pertinent imaging results/findings within the past 24 hours.

## 2024-12-24 NOTE — OP NOTE
[facility]/Surg  Surgery Department  Operative Note    SUMMARY     Date of Procedure: 12/24/2024     Procedure:  Intramedullary nailing of right proximal femoral fracture    Surgeons and Role:     * Mina Olson MD - Primary    Assisting Surgeon:  Delicia    Pre-Operative Diagnosis: Hip fracture, right, closed, initial encounter [S72.001A]    Post-Operative Diagnosis: Post-Op Diagnosis Codes:     * Hip fracture, right, closed, initial encounter [S72.001A]    Anesthesia: General    Intraoperative Findings:  Comminuted displaced subtrochanteric hip fracture    Description of the Findings of the Procedure:  Patient was placed on the operating table in supine position.  She was placed onto the fracture table.  She was well padded and protected.  Using C-arm guidance and traction we were able to get an acceptable reduction.  At this point she was prepped and draped in the usual sterile manner for surgery.  Time-out was undertaken and completed.  Incision was made proximal the greater trochanter for 2 cm it was carried down sharply through the skin.  A guidewire was inserted into the tip of the greater trochanter.  C-arm confirmed it was in ideal position.  It was over reamed.  We now placed a long guidewire down the entire femur.  C-arm confirmed that we were in ideal position.  We now measured the guidewire.  At this point we reamed to 11.5.  We now dropped an 11 mm nail down the femur tele was in perfect position.  We now used the out rigor advanced a guidewire up into the femoral head was noted to be in ideal position on the C-arm.  It was over drilled and then a compression screw was placed.  At this point the screw was locked by dropping the set screw through the top of the nail.  C-arm confirmed that set screw was down and the nail was locked.  We now removed the out rigor and turned our attention to the distal locking.  The C-arm was position so that we had a perfect [place] distally.  At this  point we used a freehand technique through a stab incision and placed a single screw across the distal nail.  We obtained an excellent bite.  C-arm confirmed that I fracture was acceptably aligned and that our hardware was in ideal position.  We irrigated.  We closed the subcu with 2-0 Vicryl and the skin with 4-0 Monocryl.  The stab wounds were closed with 4-0 nylon.  Sterile dressings were applied and the patient was noted to be in stable condition    Complications: No    Estimated Blood Loss (EBL): 100 mL           Implants:   Implant Name Type Inv. Item Serial No.  Lot No. LRB No. Used Action   NAIL TFN-ADV R 130DEG [id number] - [id number]  NAIL TFN-ADV R 130DEG [id number]  [employer] [id number] Right 1 Implanted   SCREW TFN ADVANCE 100MM - [id number]  SCREW TFN ADVANCE 100MM  [employer] [id number] Right 1 Implanted   SCREW XL25 IM NAIL BHAVNA 5X54MM - [id number]  SCREW XL25 IM NAIL BHAVNA 5X54MM  [employer] [id number] Right 1 Implanted   SCREW BHAVNA XL25 5X48MM - [id number]  SCREW BHAVNA XL25 5X48MM  [employer] [id number] Right 1 Implanted and Explanted   WIRE GUIDE 3.2MM 400MM - [id number]  WIRE GUIDE 3.2MM 400MM  [employer]ES  Right 2 Implanted and Explanted       Specimens:   Specimen (24h ago, onward)      None                    Condition: Good    Disposition: PACU - hemodynamically stable.              Discharge Note    SUMMARY     Admit Date: 12/23/2024    Discharge Date and Time:  12/24/2024 8:31 AM    Hospital Course (synopsis of major diagnoses, care, treatment, and services provided during the course of the hospital stay): Uneventful      Final Diagnosis: Post-Op Diagnosis Codes:     * Hip fracture, right, closed, initial encounter [S72.001A]    Disposition: Home or Self Care    Follow Up/Patient Instructions:     Medications:  Reconciled Home Medications:   Current Discharge Medication List        CONTINUE these medications which have NOT CHANGED    Details   acetaminophen (TYLENOL) 650 MG TbSR Take 650 mg by mouth every 8  (eight) hours.      amLODIPine (NORVASC) 10 MG tablet Take 10 mg by mouth once daily.       atenoloL (TENORMIN) 25 MG tablet Take 25 mg by mouth 2 (two) times daily.      cholecalciferol, vitamin D3, (VITAMIN D3) 25 mcg (1,000 unit) capsule Take 1,000 Units by mouth once daily.      glipiZIDE (GLUCOTROL) 5 MG tablet Take 5 mg by mouth once daily.      LORazepam (ATIVAN) 0.5 MG tablet Take 1 tablet (0.5 mg total) by mouth every 8 (eight) hours as needed for Anxiety.  Qty: 15 tablet, Refills: 0      magnesium 250 mg Tab Take 1 tablet by mouth once daily.      multivitamin (THERAGRAN) per tablet Take 1 tablet by mouth once daily.      pantoprazole (PROTONIX) 40 MG tablet Take 40 mg by mouth once daily.      promethazine-dextromethorphan (PROMETHAZINE-DM) 6.25-15 mg/5 mL Syrp Take 5 mLs by mouth every 6 (six) hours as needed (cough).  Qty: 120 mL, Refills: 0      rosuvastatin (CRESTOR) 40 MG Tab Take 40 mg by mouth once daily.      semaglutide (OZEMPIC) 1 mg/dose (2 mg/1.5 mL) PnIj Inject 1 mg into the skin every 7 days.            No discharge procedures on file.

## 2024-12-24 NOTE — PLAN OF CARE
Pt arrived to unit from [facility] at approximately 1630, montero catheter 16F draining clear yellow urine without foul odor, right hip pain intensify with repositioning, rash/bump appearing area on left abdominal folds, will continue to monitor, observe and note any changes, safety maintain

## 2024-12-24 NOTE — ASSESSMENT & PLAN NOTE
Some low pressures reported.  Partially related to opiate pain med and anesthesia.  -discontinue her amlodipine and atenolol for now and reassess

## 2024-12-24 NOTE — ANESTHESIA PREPROCEDURE EVALUATION
12/24/2024  Doris Hoover is a 64 y.o., female.      Pre-op Assessment    I have reviewed the Patient Summary Reports.     I have reviewed the Nursing Notes. I have reviewed the NPO Status.   I have reviewed the Medications.     Review of Systems  Anesthesia Hx:  No problems with previous Anesthesia                Cardiovascular:     Hypertension Valvular problems/Murmurs, MVP                                   Hypertension         Pulmonary:    Asthma       Asthma:               Hepatic/GI:     GERD         Gerd          Neurological:  Neurology Normal                                      Endocrine:  Diabetes, type 2    Diabetes                          Physical Exam  General: Well nourished    Airway:  Mallampati: II   Mouth Opening: Normal  TM Distance: Normal  Neck ROM: Normal ROM        Anesthesia Plan  Type of Anesthesia, risks & benefits discussed:    Anesthesia Type: Gen ETT  Intra-op Monitoring Plan: Standard ASA Monitors  Post Op Pain Control Plan: multimodal analgesia  Induction:  IV  Airway Plan: Video  Informed Consent: Informed consent signed with the Patient and all parties understand the risks and agree with anesthesia plan.  All questions answered.   ASA Score: 2    Ready For Surgery From Anesthesia Perspective.     .

## 2024-12-24 NOTE — PT/OT/SLP PROGRESS
Physical Therapy      Patient Name:  Doris Hoover   MRN:  [id number]    Patient not seen today secondary to surgery  .

## 2024-12-24 NOTE — PT/OT/SLP PROGRESS
Occupational Therapy      Patient Name:  Doris Hoover   MRN:  [id number]    Patient admitted due to R hip fx. Patient scheduled for sx today. Will follow up POD#1. Please re-order OT services post sx.    12/24/2024

## 2024-12-24 NOTE — HOSPITAL COURSE
64-year-old female with asthma, hypertension, diabetes is admitted after a fall with right displaced intertrochanteric femur fracture.  Orthopedic consulted.  Underwent intramedullary nailing 12/24.  PT/OT consulted. Lovenox for DVT prevention.  1 unit packed red blood cell ordered 12/27. PT/OT recommended high intensity therapy, case management worked on arrangements. PT/OT continued to work with patient, and continued to recommend high intensity therapy. The patient was discharged in stable condition to rehab facility.

## 2024-12-24 NOTE — ASSESSMENT & PLAN NOTE
"Patient's FSGs are controlled on current medication regimen.  Last A1c reviewed- No results found for: "LABA1C", "HGBA1C"  Most recent fingerstick glucose reviewed-   Recent Labs   Lab 12/23/24 2031   POCTGLUCOSE 269*     Current correctional scale  Medium  Maintain anti-hyperglycemic dose as follows-   Antihyperglycemics (From admission, onward)      Start     Stop Route Frequency Ordered    12/23/24 1802  insulin aspart U-100 pen 1-10 Units         -- SubQ Before meals & nightly PRN 12/23/24 1703          Hold Oral hypoglycemics while patient is in the hospital.   "

## 2024-12-24 NOTE — PLAN OF CARE
Release per anesthesia vital signs stable instructed on IS no n&v  encouraged deep breaths has all belongings. Ice to right hip

## 2024-12-24 NOTE — PLAN OF CARE
Problem: Adult Inpatient Plan of Care  Goal: Plan of Care Review  Outcome: Progressing  Goal: Patient-Specific Goal (Individualized)  Outcome: Progressing  Goal: Absence of Hospital-Acquired Illness or Injury  Outcome: Progressing  Goal: Optimal Comfort and Wellbeing  Outcome: Progressing  Goal: Readiness for Transition of Care  Outcome: Progressing     Problem: Diabetes Comorbidity  Goal: Blood Glucose Level Within Targeted Range  Outcome: Progressing     Problem: Skin Injury Risk Increased  Goal: Skin Health and Integrity  Outcome: Progressing     Problem: Infection  Goal: Absence of Infection Signs and Symptoms  Outcome: Progressing     POC/Meds reviewed with pt. Pt verbalized complete understanding. Two hour pt rounding maintained throughout shift. All fall precautions maintained. Lagunas Cath. in place no redness or breakdown noted. Pain maintained throughout shift with prn pain meds. Bed in lowest position, locked, and call light in reach. Side rails up x2. No complaints at this time. Will continue to monitor.

## 2024-12-24 NOTE — ANESTHESIA PROCEDURE NOTES
Intubation    Date/Time: 12/24/2024 7:31 AM    Performed by: Dioegnes Gamboa CRNA  Authorized by: Michael Ellis MD    Intubation:     Induction:  Intravenous    Intubated:  Postinduction    Mask Ventilation:  Easy mask    Attempts:  1    Attempted By:  CRNA    Difficult Airway Encountered?: No      Complications:  None    Airway Device:  Supraglottic airway/LMA    Airway Device Size:  3.0    Style/Cuff Inflation:  Cuffed (inflated to minimal occlusive pressure)    Secured at:  The lips    Placement Verified By:  Capnometry    Complicating Factors:  None

## 2024-12-24 NOTE — PLAN OF CARE
Nutrition Plan of Care:    Recommendations     1. Recommend to advance to a diabetic, consistent carbohydrate modified diet when appropriate    2. Recommend protein supplement: Beneprotein or ProMod BID, once NPO status advances to promote post op healing    3. Monitor labs and weights     Goals:  Patient to advance with nutrition therapy, oral diet prior to 48 hours     Nutrition Goal Status: new  Communication of RD Recs: other (comment) (consult, poc)     Assessment and Plan     Nutrition Problem  Inadequate oral intake      Related to (etiology):   NPO status due to closed displaced intertrochanteric fracture of right femur     Signs and Symptoms (as evidenced by):  Surgery         Interventions (treatment strategy):  Consistent Carbohydrate modified diet   Modified beverage medical food supplement therapy      Nutrition Diagnosis Status:   New

## 2024-12-24 NOTE — ASSESSMENT & PLAN NOTE
Now status post intramedullary nail 12/24  -p.r.n. pain med  -follow up postop H and H  -IS  -Lovenox for DVT prophylaxis  -PT/OT to help determine dispo  -follow up ortho outpatient in 2 weeks postop

## 2024-12-24 NOTE — PROGRESS NOTES
[facility]  Progress Note    Patient Name: Doris Hoover  MRN: [id number]  Patient Class: IP- Inpatient   Admission Date: 12/23/2024  Length of Stay: 1 days  Attending Physician: Clemente Mahmood MD  Primary Care Provider: Brandyn Alvares IV, MD        Subjective     Principal Problem:Closed displaced intertrochanteric fracture of right femur        HPI:  Doris Hoover is a 64 year female who presented in transfer from [facility] in [place].  She was transferred for orthopedic services.  She was transferred for a displaced right intertrochanteric fracture.  She sustained mechanical fall at residence.  She does not not take anticoagulants.  No head injury or loss of consciousness.  No recent fever chills.  No known sick contacts or travel.  She describes the pain as intense.  She rates the pain at 10/10.  Pain is worse with any movement of the right leg.  Minimal relief with remaining still.  Previous medical history includes asthma, hypertension, interstitial cystitis, diabetes.  Outside facility labs reviewed.  CBC unremarkable.  Glucose 181.  Admitted to Hospital Medicine.  Plans for OR repair in a.m. with Dr. Olson.    Overview/Hospital Course:  64-year-old female with asthma, hypertension, diabetes is admitted after a fall with right displaced intertrochanteric femur fracture.  Orthopedic consulted.  Underwent intramedullary nailing 12/24.  PT/OT consulted.    Interval History:  Seen on a.m. rounds.  She is postop right femoral fracture repair this morning with intramedullary nailing.  Mother is bedside.  No complaints.      Objective:     Vital Signs (Most Recent):  Temp: 97.8 °F (36.6 °C) (12/24/24 1201)  Pulse: 69 (12/24/24 1201)  Resp: 14 (12/24/24 1201)  BP: (!) 105/58 (12/24/24 1354)  SpO2: (!) 94 % (12/24/24 1319) Vital Signs (24h Range):  Temp:  [97.5 °F (36.4 °C)-98.6 °F (37 °C)] 97.8 °F (36.6 °C)  Pulse:  [64-82] 69  Resp:  [0-26] 14  SpO2:  [89  There are no Wet Read(s) to document. "%-97 %] 94 %  BP: ()/(53-67) 105/58     Weight: 78.4 kg (172 lb 13.5 oz)  Body mass index is 27.9 kg/m².    Intake/Output Summary (Last 24 hours) at 12/24/2024 1419  Last data filed at 12/24/2024 0828  Gross per 24 hour   Intake 1340 ml   Output 500 ml   Net 840 ml         Physical Exam  Vitals reviewed.   Constitutional:       General: She is not in acute distress.  HENT:      Head: Normocephalic and atraumatic.   Cardiovascular:      Rate and Rhythm: Normal rate and regular rhythm.   Pulmonary:      Effort: Pulmonary effort is normal. No respiratory distress.   Musculoskeletal:      Comments: Three dressings to the right lateral thigh C/D/I  RLE NVI   Neurological:      General: No focal deficit present.      Mental Status: She is alert and oriented to person, place, and time. Mental status is at baseline.   Psychiatric:         Mood and Affect: Affect normal.         Behavior: Behavior normal.         Thought Content: Thought content normal.             Significant Labs: All pertinent labs within the past 24 hours have been reviewed.    Significant Imaging: I have reviewed all pertinent imaging results/findings within the past 24 hours.    Assessment and Plan     * Closed displaced intertrochanteric fracture of right femur  Now status post intramedullary nail 12/24  -p.r.n. pain med  -follow up postop H and H  -IS  -Lovenox for DVT prophylaxis  -PT/OT to help determine dispo  -follow up ortho outpatient in 2 weeks postop    Type 2 diabetes mellitus, without long-term current use of insulin  Patient's FSGs are controlled on current medication regimen.  Last A1c reviewed- No results found for: "LABA1C", "HGBA1C"  Most recent fingerstick glucose reviewed-   Recent Labs   Lab 12/23/24 2031   POCTGLUCOSE 269*     Current correctional scale  Medium  Maintain anti-hyperglycemic dose as follows-   Antihyperglycemics (From admission, onward)      Start     Stop Route Frequency Ordered    12/23/24 1802  insulin " aspart U-100 pen 1-10 Units         -- SubQ Before meals & nightly PRN 12/23/24 1703          Hold Oral hypoglycemics while patient is in the hospital.     Essential hypertension  Some low pressures reported.  Partially related to opiate pain med and anesthesia.  -discontinue her amlodipine and atenolol for now and reassess      VTE Risk Mitigation (From admission, onward)           Ordered     enoxaparin injection 40 mg  Every 24 hours         12/24/24 0814     IP VTE HIGH RISK PATIENT  Once         12/23/24 1703     Place sequential compression device  Until discontinued         12/23/24 1703     Place JIMMY hose  Until discontinued         12/23/24 1703                    Discharge Planning   PETER: 12/27/2024     Code Status: Full Code   Medical Readiness for Discharge Date:   Discharge Plan A: Home Health              Clemente Mahmood MD  Department of Hospital Medicine   [facility]/Surg

## 2024-12-24 NOTE — PLAN OF CARE
Brought to preop via own bed with daughter at side, plan of care reviewed and daughter set up for text messages

## 2024-12-25 LAB
ANION GAP SERPL CALC-SCNC: 8 MMOL/L (ref 8–16)
BUN SERPL-MCNC: 8 MG/DL (ref 8–23)
CALCIUM SERPL-MCNC: 7.8 MG/DL (ref 8.7–10.5)
CHLORIDE SERPL-SCNC: 102 MMOL/L (ref 95–110)
CO2 SERPL-SCNC: 23 MMOL/L (ref 23–29)
CREAT SERPL-MCNC: 0.7 MG/DL (ref 0.5–1.4)
ERYTHROCYTE [DISTWIDTH] IN BLOOD BY AUTOMATED COUNT: 13.2 % (ref 11.5–14.5)
EST. GFR  (NO RACE VARIABLE): >60 ML/MIN/1.73 M^2
GLUCOSE SERPL-MCNC: 185 MG/DL (ref 70–110)
HCT VFR BLD AUTO: 26.2 % (ref 37–48.5)
HGB BLD-MCNC: 8.4 G/DL (ref 12–16)
MCH RBC QN AUTO: 30.3 PG (ref 27–31)
MCHC RBC AUTO-ENTMCNC: 32.1 G/DL (ref 32–36)
MCV RBC AUTO: 95 FL (ref 82–98)
PLATELET # BLD AUTO: 121 K/UL (ref 150–450)
PMV BLD AUTO: 10.8 FL (ref 9.2–12.9)
POCT GLUCOSE: 146 MG/DL (ref 70–110)
POCT GLUCOSE: 147 MG/DL (ref 70–110)
POCT GLUCOSE: 163 MG/DL (ref 70–110)
POCT GLUCOSE: 275 MG/DL (ref 70–110)
POTASSIUM SERPL-SCNC: 4 MMOL/L (ref 3.5–5.1)
RBC # BLD AUTO: 2.77 M/UL (ref 4–5.4)
SODIUM SERPL-SCNC: 133 MMOL/L (ref 136–145)
WBC # BLD AUTO: 8.9 K/UL (ref 3.9–12.7)

## 2024-12-25 PROCEDURE — 25000003 PHARM REV CODE 250: Performed by: HOSPITALIST

## 2024-12-25 PROCEDURE — 27000221 HC OXYGEN, UP TO 24 HOURS

## 2024-12-25 PROCEDURE — 63600175 PHARM REV CODE 636 W HCPCS: Performed by: NURSE PRACTITIONER

## 2024-12-25 PROCEDURE — 11000001 HC ACUTE MED/SURG PRIVATE ROOM

## 2024-12-25 PROCEDURE — 94799 UNLISTED PULMONARY SVC/PX: CPT

## 2024-12-25 PROCEDURE — 25000003 PHARM REV CODE 250: Performed by: NURSE PRACTITIONER

## 2024-12-25 PROCEDURE — 99900035 HC TECH TIME PER 15 MIN (STAT)

## 2024-12-25 PROCEDURE — 80048 BASIC METABOLIC PNL TOTAL CA: CPT | Performed by: ORTHOPAEDIC SURGERY

## 2024-12-25 PROCEDURE — 63600175 PHARM REV CODE 636 W HCPCS: Performed by: ORTHOPAEDIC SURGERY

## 2024-12-25 PROCEDURE — 36415 COLL VENOUS BLD VENIPUNCTURE: CPT | Performed by: ORTHOPAEDIC SURGERY

## 2024-12-25 PROCEDURE — 25000003 PHARM REV CODE 250: Performed by: STUDENT IN AN ORGANIZED HEALTH CARE EDUCATION/TRAINING PROGRAM

## 2024-12-25 PROCEDURE — 94761 N-INVAS EAR/PLS OXIMETRY MLT: CPT

## 2024-12-25 PROCEDURE — 25000003 PHARM REV CODE 250: Performed by: ORTHOPAEDIC SURGERY

## 2024-12-25 PROCEDURE — 63600175 PHARM REV CODE 636 W HCPCS: Performed by: STUDENT IN AN ORGANIZED HEALTH CARE EDUCATION/TRAINING PROGRAM

## 2024-12-25 PROCEDURE — 63600175 PHARM REV CODE 636 W HCPCS: Performed by: HOSPITALIST

## 2024-12-25 PROCEDURE — 85027 COMPLETE CBC AUTOMATED: CPT | Performed by: ORTHOPAEDIC SURGERY

## 2024-12-25 RX ADMIN — HYDROMORPHONE HYDROCHLORIDE 0.5 MG: 1 INJECTION, SOLUTION INTRAMUSCULAR; INTRAVENOUS; SUBCUTANEOUS at 04:12

## 2024-12-25 RX ADMIN — ACETAMINOPHEN 650 MG: 325 TABLET ORAL at 08:12

## 2024-12-25 RX ADMIN — MUPIROCIN 1 G: 20 OINTMENT TOPICAL at 08:12

## 2024-12-25 RX ADMIN — PROMETHAZINE HYDROCHLORIDE 12.5 MG: 25 INJECTION INTRAMUSCULAR; INTRAVENOUS at 06:12

## 2024-12-25 RX ADMIN — MELATONIN TAB 3 MG 9 MG: 3 TAB at 08:12

## 2024-12-25 RX ADMIN — ONDANSETRON 4 MG: 2 INJECTION INTRAMUSCULAR; INTRAVENOUS at 04:12

## 2024-12-25 RX ADMIN — HYDROMORPHONE HYDROCHLORIDE 0.5 MG: 1 INJECTION, SOLUTION INTRAMUSCULAR; INTRAVENOUS; SUBCUTANEOUS at 11:12

## 2024-12-25 RX ADMIN — OXYCODONE HYDROCHLORIDE AND ACETAMINOPHEN 1 TABLET: 5; 325 TABLET ORAL at 01:12

## 2024-12-25 RX ADMIN — ONDANSETRON 4 MG: 2 INJECTION INTRAMUSCULAR; INTRAVENOUS at 01:12

## 2024-12-25 RX ADMIN — ENOXAPARIN SODIUM 40 MG: 40 INJECTION SUBCUTANEOUS at 05:12

## 2024-12-25 RX ADMIN — INSULIN ASPART 2 UNITS: 100 INJECTION, SOLUTION INTRAVENOUS; SUBCUTANEOUS at 08:12

## 2024-12-25 RX ADMIN — PANTOPRAZOLE SODIUM 40 MG: 40 TABLET, DELAYED RELEASE ORAL at 08:12

## 2024-12-25 RX ADMIN — OXYCODONE HYDROCHLORIDE AND ACETAMINOPHEN 1 TABLET: 5; 325 TABLET ORAL at 08:12

## 2024-12-25 NOTE — CARE UPDATE
12/25/24 0736   Patient Assessment/Suction   Level of Consciousness (AVPU) alert   Respiratory Effort Normal   PRE-TX-O2   Device (Oxygen Therapy) nasal cannula   $ Is the patient on Low Flow Oxygen? Yes   Flow (L/min) (Oxygen Therapy) 3   SpO2 95 %   Pulse Oximetry Type Intermittent   $ Pulse Oximetry - Multiple Charge Pulse Oximetry - Multiple   Aerosol Therapy   $ Aerosol Therapy Charges PRN treatment not required   Respiratory Treatment Status (SVN) PRN treatment not required   Incentive Spirometer   $ Incentive Spirometer Charges unable to perform  (bad headache at this time)

## 2024-12-25 NOTE — PLAN OF CARE
Problem: Adult Inpatient Plan of Care  Goal: Plan of Care Review  Outcome: Progressing     Problem: Adult Inpatient Plan of Care  Goal: Readiness for Transition of Care  Outcome: Progressing     Pt rested in bed since arrival to floor post op. Dressing r hip/leg CDI. Lagunas in place and draining. Pain managed with prn oral and IV medication. VSS. 3L O2 currently in place. NSR on telemetry. Safety maintained. Needs attended to.

## 2024-12-25 NOTE — PLAN OF CARE
POC reviewed with pt. Pt verbalized understanding.    Pain mgmt is controlled with PRN medications as ordered.  Wounds: incisions x3 to right hip area r/t hip pinning r/t hip fx.  Pt continues with montero with no adverse reactions noted. Leslie clear urine observed flowing freely into montero bag.  Pt continues with Accuchecks, Tele 8719 NSR, O2 3LNC,  with no adverse reactions noted.     All fall precautions maintained. Frequent checks performed per protocol. Bed in lowest position, call light within reach, slip resistant socks maintained. Bed alarm on/functioning. POC ongoing.         Problem: Adult Inpatient Plan of Care  Goal: Plan of Care Review  Outcome: Progressing  Goal: Patient-Specific Goal (Individualized)  Outcome: Progressing  Goal: Absence of Hospital-Acquired Illness or Injury  Outcome: Progressing  Goal: Optimal Comfort and Wellbeing  Outcome: Progressing  Goal: Readiness for Transition of Care  Outcome: Progressing     Problem: Diabetes Comorbidity  Goal: Blood Glucose Level Within Targeted Range  Outcome: Progressing     Problem: Skin Injury Risk Increased  Goal: Skin Health and Integrity  Outcome: Progressing     Problem: Infection  Goal: Absence of Infection Signs and Symptoms  Outcome: Progressing     Problem: Wound  Goal: Optimal Coping  Outcome: Progressing  Goal: Optimal Functional Ability  Outcome: Progressing  Goal: Absence of Infection Signs and Symptoms  Outcome: Progressing  Goal: Improved Oral Intake  Outcome: Progressing  Goal: Optimal Pain Control and Function  Outcome: Progressing  Goal: Skin Health and Integrity  Outcome: Progressing  Goal: Optimal Wound Healing  Outcome: Progressing

## 2024-12-25 NOTE — CARE UPDATE
12/24/24 1937   Patient Assessment/Suction   Level of Consciousness (AVPU) alert   Respiratory Effort Unlabored   Expansion/Accessory Muscles/Retractions no use of accessory muscles;no retractions   PRE-TX-O2   Device (Oxygen Therapy) nasal cannula   $ Is the patient on Low Flow Oxygen? Yes   Flow (L/min) (Oxygen Therapy) 3   SpO2 95 %   Pulse Oximetry Type Intermittent   $ Pulse Oximetry - Multiple Charge Pulse Oximetry - Multiple   Pulse 81   Resp 17   Aerosol Therapy   $ Aerosol Therapy Charges PRN treatment not required

## 2024-12-25 NOTE — PROGRESS NOTES
[facility] Medicine  Progress Note    Patient Name: Doris Hoover  MRN: [id number]  Patient Class: IP- Inpatient   Admission Date: 12/23/2024  Length of Stay: 2 days  Attending Physician: Cele Herring MD  Primary Care Provider: Brandyn Alvares IV, MD        Subjective     Principal Problem:Closed displaced intertrochanteric fracture of right femur        HPI:  Doris Hoover is a 64 year female who presented in transfer from [facility] in [place].  She was transferred for orthopedic services.  She was transferred for a displaced right intertrochanteric fracture.  She sustained mechanical fall at residence.  She does not not take anticoagulants.  No head injury or loss of consciousness.  No recent fever chills.  No known sick contacts or travel.  She describes the pain as intense.  She rates the pain at 10/10.  Pain is worse with any movement of the right leg.  Minimal relief with remaining still.  Previous medical history includes asthma, hypertension, interstitial cystitis, diabetes.  Outside facility labs reviewed.  CBC unremarkable.  Glucose 181.  Admitted to Hospital Medicine.  Plans for OR repair in a.m. with Dr. Olson.    Overview/Hospital Course:  64-year-old female with asthma, hypertension, diabetes is admitted after a fall with right displaced intertrochanteric femur fracture.  Orthopedic consulted.  Underwent intramedullary nailing 12/24.  PT/OT consulted. Lovenox for DVT prevention.     No new subjective & objective note has been filed under this hospital service since the last note was generated.      Assessment and Plan     * Closed displaced intertrochanteric fracture of right femur  Now status post intramedullary nail 12/24  -p.r.n. pain med  -follow up postop H and H  -IS  -Lovenox for DVT prophylaxis  -PT/OT to help determine dispo  -follow up ortho outpatient in 2 weeks postop    Type 2 diabetes mellitus, without long-term current use of  "insulin  Patient's FSGs are controlled on current medication regimen.  Last A1c reviewed- No results found for: "LABA1C", "HGBA1C"  Most recent fingerstick glucose reviewed-   Recent Labs   Lab 12/23/24 2031   POCTGLUCOSE 269*     Current correctional scale  Medium  Maintain anti-hyperglycemic dose as follows-   Antihyperglycemics (From admission, onward)      Start     Stop Route Frequency Ordered    12/23/24 1802  insulin aspart U-100 pen 1-10 Units         -- SubQ Before meals & nightly PRN 12/23/24 1703          Hold Oral hypoglycemics while patient is in the hospital.     Essential hypertension  Some low pressures reported.  Partially related to opiate pain med and anesthesia.  -discontinue her amlodipine and atenolol for now and reassess      VTE Risk Mitigation (From admission, onward)           Ordered     enoxaparin injection 40 mg  Every 24 hours         12/24/24 0814     IP VTE HIGH RISK PATIENT  Once         12/23/24 1703     Place sequential compression device  Until discontinued         12/23/24 1703     Place JIMMY hose  Until discontinued         12/23/24 1703                    Discharge Planning   PETER: 12/27/2024     Code Status: Full Code   Medical Readiness for Discharge Date:   Discharge Plan A: Home Health                Please place Justification for DME        Cele Herring MD  Department of Hospital Medicine   [facility] - Med/Surg    "

## 2024-12-26 PROBLEM — R50.82 POSTOPERATIVE FEVER: Status: ACTIVE | Noted: 2024-12-26

## 2024-12-26 PROBLEM — D62 ACUTE BLOOD LOSS ANEMIA: Status: ACTIVE | Noted: 2024-12-26

## 2024-12-26 LAB
ANION GAP SERPL CALC-SCNC: 8 MMOL/L (ref 8–16)
BUN SERPL-MCNC: 7 MG/DL (ref 8–23)
CALCIUM SERPL-MCNC: 8.2 MG/DL (ref 8.7–10.5)
CHLORIDE SERPL-SCNC: 102 MMOL/L (ref 95–110)
CO2 SERPL-SCNC: 25 MMOL/L (ref 23–29)
CREAT SERPL-MCNC: 0.6 MG/DL (ref 0.5–1.4)
ERYTHROCYTE [DISTWIDTH] IN BLOOD BY AUTOMATED COUNT: 13.2 % (ref 11.5–14.5)
EST. GFR  (NO RACE VARIABLE): >60 ML/MIN/1.73 M^2
GLUCOSE SERPL-MCNC: 152 MG/DL (ref 70–110)
HCT VFR BLD AUTO: 25.2 % (ref 37–48.5)
HGB BLD-MCNC: 7.8 G/DL (ref 12–16)
MCH RBC QN AUTO: 29.7 PG (ref 27–31)
MCHC RBC AUTO-ENTMCNC: 31 G/DL (ref 32–36)
MCV RBC AUTO: 96 FL (ref 82–98)
PLATELET # BLD AUTO: 126 K/UL (ref 150–450)
PMV BLD AUTO: 11.1 FL (ref 9.2–12.9)
POCT GLUCOSE: 148 MG/DL (ref 70–110)
POCT GLUCOSE: 164 MG/DL (ref 70–110)
POCT GLUCOSE: 174 MG/DL (ref 70–110)
POCT GLUCOSE: 231 MG/DL (ref 70–110)
POTASSIUM SERPL-SCNC: 4 MMOL/L (ref 3.5–5.1)
RBC # BLD AUTO: 2.63 M/UL (ref 4–5.4)
SODIUM SERPL-SCNC: 135 MMOL/L (ref 136–145)
WBC # BLD AUTO: 8.36 K/UL (ref 3.9–12.7)

## 2024-12-26 PROCEDURE — 27000221 HC OXYGEN, UP TO 24 HOURS

## 2024-12-26 PROCEDURE — 94761 N-INVAS EAR/PLS OXIMETRY MLT: CPT

## 2024-12-26 PROCEDURE — 99900035 HC TECH TIME PER 15 MIN (STAT)

## 2024-12-26 PROCEDURE — 11000001 HC ACUTE MED/SURG PRIVATE ROOM

## 2024-12-26 PROCEDURE — 97530 THERAPEUTIC ACTIVITIES: CPT

## 2024-12-26 PROCEDURE — 25000003 PHARM REV CODE 250: Performed by: ORTHOPAEDIC SURGERY

## 2024-12-26 PROCEDURE — 97165 OT EVAL LOW COMPLEX 30 MIN: CPT

## 2024-12-26 PROCEDURE — 85027 COMPLETE CBC AUTOMATED: CPT | Performed by: ORTHOPAEDIC SURGERY

## 2024-12-26 PROCEDURE — 80048 BASIC METABOLIC PNL TOTAL CA: CPT | Performed by: ORTHOPAEDIC SURGERY

## 2024-12-26 PROCEDURE — 63600175 PHARM REV CODE 636 W HCPCS: Performed by: ORTHOPAEDIC SURGERY

## 2024-12-26 PROCEDURE — 94799 UNLISTED PULMONARY SVC/PX: CPT

## 2024-12-26 PROCEDURE — 25000003 PHARM REV CODE 250: Performed by: NURSE PRACTITIONER

## 2024-12-26 PROCEDURE — 97110 THERAPEUTIC EXERCISES: CPT

## 2024-12-26 PROCEDURE — 36415 COLL VENOUS BLD VENIPUNCTURE: CPT | Performed by: ORTHOPAEDIC SURGERY

## 2024-12-26 PROCEDURE — 63600175 PHARM REV CODE 636 W HCPCS: Performed by: STUDENT IN AN ORGANIZED HEALTH CARE EDUCATION/TRAINING PROGRAM

## 2024-12-26 PROCEDURE — 25000003 PHARM REV CODE 250: Performed by: STUDENT IN AN ORGANIZED HEALTH CARE EDUCATION/TRAINING PROGRAM

## 2024-12-26 PROCEDURE — 97162 PT EVAL MOD COMPLEX 30 MIN: CPT

## 2024-12-26 RX ADMIN — MUPIROCIN 1 G: 20 OINTMENT TOPICAL at 08:12

## 2024-12-26 RX ADMIN — INSULIN ASPART 2 UNITS: 100 INJECTION, SOLUTION INTRAVENOUS; SUBCUTANEOUS at 08:12

## 2024-12-26 RX ADMIN — INSULIN ASPART 2 UNITS: 100 INJECTION, SOLUTION INTRAVENOUS; SUBCUTANEOUS at 12:12

## 2024-12-26 RX ADMIN — HYDROMORPHONE HYDROCHLORIDE 0.5 MG: 1 INJECTION, SOLUTION INTRAMUSCULAR; INTRAVENOUS; SUBCUTANEOUS at 01:12

## 2024-12-26 RX ADMIN — OXYCODONE HYDROCHLORIDE AND ACETAMINOPHEN 1 TABLET: 5; 325 TABLET ORAL at 08:12

## 2024-12-26 RX ADMIN — ENOXAPARIN SODIUM 40 MG: 40 INJECTION SUBCUTANEOUS at 04:12

## 2024-12-26 RX ADMIN — HYDROMORPHONE HYDROCHLORIDE 0.5 MG: 1 INJECTION, SOLUTION INTRAMUSCULAR; INTRAVENOUS; SUBCUTANEOUS at 04:12

## 2024-12-26 RX ADMIN — HYDROMORPHONE HYDROCHLORIDE 0.5 MG: 1 INJECTION, SOLUTION INTRAMUSCULAR; INTRAVENOUS; SUBCUTANEOUS at 08:12

## 2024-12-26 RX ADMIN — MELATONIN TAB 3 MG 9 MG: 3 TAB at 08:12

## 2024-12-26 RX ADMIN — OXYCODONE HYDROCHLORIDE AND ACETAMINOPHEN 1 TABLET: 5; 325 TABLET ORAL at 04:12

## 2024-12-26 RX ADMIN — PANTOPRAZOLE SODIUM 40 MG: 40 TABLET, DELAYED RELEASE ORAL at 08:12

## 2024-12-26 NOTE — PT/OT/SLP EVAL
Physical Therapy Evaluation    Patient Name:  Doris Hoover   MRN:  [id number]    Recommendations:     Discharge Recommendations: High Intensity Therapy   Discharge Equipment Recommendations: none   Barriers to discharge: Decreased caregiver support    Assessment:     Doris Hoover is a 64 y.o. female admitted with a medical diagnosis of Closed displaced intertrochanteric fracture of right femur.  She presents with the following impairments/functional limitations: weakness, impaired endurance, impaired functional mobility, gait instability, impaired balance, decreased lower extremity function, pain, decreased ROM, impaired cardiopulmonary response to activity, orthopedic precautions .    Pt seen supine in bed alert and agreeable to PT. Son at bedside. Pt wanting to use commode to void. Pt requiring min/mod assist to sit EOB. Pt able to stand with RW min/mod assist and few steps to commode and voided. Set up assist hygiene. Pt then progressed gait training 4 steps with VC for technique and foot placement. OOB chair. Pt completed thera ex while seated with instructions for HEP.  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      Rehab Prognosis: Good; patient would benefit from acute skilled PT services to address these deficits and reach maximum level of function.    Recent Surgery: Procedure(s) (LRB):  INSERTION, INTRAMEDULLARY MIKEL, FEMUR, TROCHANTER (Right) 2 Days Post-Op    Plan:     During this hospitalization, patient to be seen daily to address the identified rehab impairments via gait training, therapeutic activities, therapeutic exercises and progress toward the following goals:    Plan of Care Expires:  01/15/25    Subjective   Stated that she got up to commode yesterday with nursing  Stated that it is getting easier today  Chief Complaint: pain/weakness  Patient/Family Comments/goals: get well and attend rehab  Pain/Comfort:  Pain Rating 1:  (not rated)  Location - Side 1:  Right  Location 1: hip  Pain Addressed 1: Pre-medicate for activity, Reposition, Distraction, Cessation of Activity, Nurse notified    Patients cultural, spiritual, Yarsani conflicts given the current situation:      Living Environment:  Home with family  Prior to admission, 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  Equipment used at home: none.  DME owned (not currently used): none.  Upon discharge, patient will have assistance from family.    Objective:     Communicated with nurse Spear prior to session.  Patient found HOB elevated with telemetry, bed alarm  upon PT entry to room.    General Precautions: Standard, fall  Orthopedic Precautions:RLE weight bearing as tolerated   Braces: N/A  Respiratory Status: O2 at 3 liters    Exams:  Postural Exam:  Patient presented with the following abnormalities:    -       Rounded shoulders  -       Forward head  -       BMI 27.90  RLE ROM: Deficits: limited by post op RH pain  RLE Strength: Deficits: 3-/5  LLE ROM: WFL  LLE Strength: WFL    Functional Mobility:  Bed Mobility:     Scooting: moderate assistance  Supine to Sit: moderate assistance  Transfers:     Sit to Stand:  minimum assistance and moderate assistance with rolling walker  Bed to Chair: minimum assistance and moderate assistance with  rolling walker  using  Stand Pivot  Toilet Transfer: minimum assistance and moderate assistance with  rolling walker  using  Stand Pivot  Gait: 4 steps with RW min/mod assist x2 with chair following WBAT       AM-PAC 6 CLICK MOBILITY  Total Score:13       Treatment & Education:  Patient was educated on the importance of OOB activity and functional mobility to negate negative effects of prolonged bed rest during hospitalization, safe transfers and ambulation, and D/C planning   Thera ex with AP,QS/GS, bilaterally LAQ,marches LLE  Commode use to void  OOB chair and reclined    Patient left up in chair with all lines intact, call button in reach, chair alarm  on, and nurse Beatris notified.    GOALS:   Multidisciplinary Problems       Physical Therapy Goals          Problem: Physical Therapy    Goal Priority Disciplines Outcome Interventions   Physical Therapy Goal     PT, PT/OT Progressing    Description: Goals to be met by: 01-     Patient will increase functional independence with mobility by performin. Supine to sit with MInimal Assistance  2. Sit to stand transfer with Minimal Assistance  3. Bed to chair transfer with Minimal Assistance using Rolling Walker  4. Gait  x 50 feet with Minimal Assistance using Rolling Walker.   5. Lower extremity exercise program x20 reps    RLE WBAT post IM nailing 2024                         History:     Past Medical History:   Diagnosis Date    Acid reflux     Allergy     Diabetes mellitus, type 2     Hypertension        Past Surgical History:   Procedure Laterality Date     SECTION      3    HYSTERECTOMY      TONSILLECTOMY         Time Tracking:     PT Received On: 24  PT Start Time: 1051     PT Stop Time: 1133  PT Total Time (min): 42 min     Billable Minutes: Evaluation 10, Therapeutic Activity 20, and Therapeutic Exercise 12      2024

## 2024-12-26 NOTE — PLAN OF CARE
Goals to be met by: 1/23/25     Patient will increase functional independence with ADLs by performing:    UE Dressing with Modified [place].  LE Dressing with Modified [place].  Grooming while standing at sink with Modified [place].  Toileting from toilet with Modified [place] for hygiene and clothing management.   Bathing from  shower chair/bench with Modified [place].  Toilet transfer to toilet with Modified [place].  Increased strength and functional activity tolerance for ADL's/IADL's

## 2024-12-26 NOTE — ASSESSMENT & PLAN NOTE
Now status post intramedullary nail 12/24  -p.r.n. pain med  -follow up postop H and H  -IS  -Lovenox for DVT prophylaxis  -PT/OT, they recommend high-intensity therapy  -follow up ortho outpatient in 2 weeks postop

## 2024-12-26 NOTE — PLAN OF CARE
Problem: Physical Therapy  Goal: Physical Therapy Goal  Description: Goals to be met by: 01-     Patient will increase functional independence with mobility by performin. Supine to sit with MInimal Assistance  2. Sit to stand transfer with Minimal Assistance  3. Bed to chair transfer with Minimal Assistance using Rolling Walker  4. Gait  x 50 feet with Minimal Assistance using Rolling Walker.   5. Lower extremity exercise program x20 reps    RLE WBAT post IM nailing 2024    Outcome: Progressing   PT eval and treat. Min/mod assist mobility- took 4 steps with RW. OOB chair. Thera ex including isometrics. HIT

## 2024-12-26 NOTE — PLAN OF CARE
Problem: Adult Inpatient Plan of Care  Goal: Plan of Care Review  Outcome: Progressing     Problem: Adult Inpatient Plan of Care  Goal: Optimal Comfort and Wellbeing  Outcome: Progressing     Pt rested in bed this shift. Dressing r hip/leg CDI. Lagunas removed and purewick in place. Pain managed with prn oral medication. Poor food intake this shift, food and fluids encouraged. Nausea intermittent, prn antiemetics administered as ordered. 2L O2 currently in place. NSR on telemetry. Safety maintained. Needs attended to.      X Size Of Lesion In Cm (Optional): 0 Detail Level: Simple

## 2024-12-26 NOTE — SUBJECTIVE & OBJECTIVE
Interval History:  Seen in afternoon.  Had fever 100.4 last night.  Pain reported to be controlled.  Son bedside.  Interested in inpatient rehab.      Objective:     Vital Signs (Most Recent):  Temp: 99.1 °F (37.3 °C) (12/26/24 1225)  Pulse: 90 (12/26/24 1322)  Resp: 16 (12/26/24 1322)  BP: (!) 119/56 (12/26/24 1225)  SpO2: 95 % (12/26/24 1322) Vital Signs (24h Range):  Temp:  [98.1 °F (36.7 °C)-100.4 °F (38 °C)] 99.1 °F (37.3 °C)  Pulse:  [86-97] 90  Resp:  [16-20] 16  SpO2:  [92 %-96 %] 95 %  BP: (110-135)/(56-64) 119/56     Weight: 78.4 kg (172 lb 13.5 oz)  Body mass index is 27.9 kg/m².    Intake/Output Summary (Last 24 hours) at 12/26/2024 1540  Last data filed at 12/26/2024 1200  Gross per 24 hour   Intake 1129.4 ml   Output 400 ml   Net 729.4 ml         Physical Exam  Vitals reviewed.   Constitutional:       General: She is not in acute distress.  HENT:      Head: Normocephalic and atraumatic.   Cardiovascular:      Rate and Rhythm: Normal rate and regular rhythm.   Pulmonary:      Effort: Pulmonary effort is normal. No respiratory distress.   Musculoskeletal:      Comments: Dressings to the right lateral thigh C/D/I  RLE NVI   Neurological:      General: No focal deficit present.      Mental Status: She is alert and oriented to person, place, and time. Mental status is at baseline.   Psychiatric:         Mood and Affect: Affect normal.         Behavior: Behavior normal.         Thought Content: Thought content normal.             Significant Labs: All pertinent labs within the past 24 hours have been reviewed.    Significant Imaging: I have reviewed all pertinent imaging results/findings within the past 24 hours.

## 2024-12-26 NOTE — ASSESSMENT & PLAN NOTE
1. Status post right hip open reduction internal fixation.      Start daily dressing changes to right hip incisions.  She can weightbear as tolerated right lower extremity.  Continue with physical therapy rehabilitation efforts.  Discharge plan per attending.  DVT prophylaxis per attending.  She can follow up in the outpatient clinic with Dr. Olson at 2 weeks postop for wound check and suture removal.  Patient is stable from an orthopedic perspective.

## 2024-12-26 NOTE — ASSESSMENT & PLAN NOTE
"Patient's FSGs are controlled on current medication regimen.  Last A1c reviewed- No results found for: "LABA1C", "HGBA1C"  Most recent fingerstick glucose reviewed-   Recent Labs   Lab 12/25/24  1645 12/25/24  1959/24  0736 12/26/24  1144   POCTGLUCOSE 147* 275* 174* 164*       Current correctional scale  Medium  Maintain anti-hyperglycemic dose as follows-   Antihyperglycemics (From admission, onward)    Start     Stop Route Frequency Ordered    12/23/24 1802  insulin aspart U-100 pen 1-10 Units         -- SubQ Before meals & nightly PRN 12/23/24 1703        Hold Oral hypoglycemics while patient is in the hospital.   "

## 2024-12-26 NOTE — PROGRESS NOTES
[facility]/Surg  Orthopedics  Progress Note    Patient Name: Doris Hoover  MRN: [id number]  Admission Date: 12/23/2024  Hospital Length of Stay: 2 days  Attending Provider: Cele Herring MD  Primary Care Provider: Brandyn Alvares IV, MD  Follow-up For: Procedure(s) (LRB):  INSERTION, INTRAMEDULLARY MIKEL, FEMUR, TROCHANTER (Right)    Post-Operative Day: 1 Day Post-Op  Subjective:     Principal Problem:Closed displaced intertrochanteric fracture of right femur    Principal Orthopedic Problem:  Right hip subtrochanteric fracture    Interval History:  Status post right hip open reduction internal fixation    Review of patient's allergies indicates:   Allergen Reactions    Butorphanol tartrate Other (See Comments)     Hallucinations  hallucinations      Methylprednisolone Other (See Comments) and Shortness Of Breath     flushing  flushing         Current Facility-Administered Medications   Medication    acetaminophen tablet 650 mg    albuterol-ipratropium 2.5 mg-0.5 mg/3 mL nebulizer solution 3 mL    aluminum-magnesium hydroxide-simethicone 200-200-20 mg/5 mL suspension 30 mL    dextrose 10% bolus 125 mL 125 mL    dextrose 10% bolus 250 mL 250 mL    enoxaparin injection 40 mg    glucagon (human recombinant) injection 1 mg    glucose chewable tablet 16 g    glucose chewable tablet 24 g    HYDROmorphone injection 0.5 mg    insulin aspart U-100 pen 1-10 Units    magnesium oxide tablet 800 mg    magnesium oxide tablet 800 mg    melatonin tablet 9 mg    mupirocin 2 % ointment    naloxone 0.4 mg/mL injection 0.02 mg    ondansetron injection 4 mg    oxyCODONE-acetaminophen 5-325 mg per tablet 1 tablet    pantoprazole EC tablet 40 mg    potassium bicarbonate disintegrating tablet 35 mEq    potassium bicarbonate disintegrating tablet 50 mEq    potassium bicarbonate disintegrating tablet 60 mEq    potassium, sodium phosphates 280-160-250 mg packet 2 packet    potassium, sodium phosphates 280-160-250 mg  "packet 2 packet    potassium, sodium phosphates 280-160-250 mg packet 2 packet    promethazine (PHENERGAN) 12.5 mg in 0.9% NaCl 50 mL IVPB    simethicone chewable tablet 80 mg    sodium chloride 0.9% flush 10 mL     Objective:     Vital Signs (Most Recent):  Temp: 98.1 °F (36.7 °C) (12/25/24 1545)  Pulse: 88 (12/25/24 1545)  Resp: 16 (12/25/24 1545)  BP: 128/64 (12/25/24 1545)  SpO2: 96 % (12/25/24 1545) Vital Signs (24h Range):  Temp:  [97.9 °F (36.6 °C)-99.4 °F (37.4 °C)] 98.1 °F (36.7 °C)  Pulse:  [78-90] 88  Resp:  [16-18] 16  SpO2:  [92 %-98 %] 96 %  BP: (104-128)/(56-64) 128/64     Weight: 78.4 kg (172 lb 13.5 oz)  Height: 5' 6" (167.6 cm)  Body mass index is 27.9 kg/m².      Intake/Output Summary (Last 24 hours) at 12/25/2024 1808  Last data filed at 12/25/2024 1701  Gross per 24 hour   Intake 960 ml   Output 1400 ml   Net -440 ml        General    Constitutional: She is oriented to person, place, and time. She appears well-developed and well-nourished.   Pulmonary/Chest: Effort normal.   Neurological: She is alert and oriented to person, place, and time.   Psychiatric: She has a normal mood and affect. Her behavior is normal. Judgment and thought content normal.             Right Hip Exam     Inspection   Swelling: absent  Bruising: absent  Erythema: absent    Range of Motion   Abduction:  abnormal   Adduction:  abnormal   Extension:  abnormal   Flexion:  abnormal   External rotation:  abnormal   Internal rotation:  abnormal     Other   Sensation: normal    Comments:  Patient lying in bed comfortably in the supine position.  Dressings to right lower extremity clean dry and intact.  No surrounding erythema or ecchymosis.  No wound dehiscence or drainage.  Negative Homans on the right.  She tolerates some hip flexion and right knee flexion/extension with some discomfort.  Did not any internal/external rotation at the hip.  Right EHL is intact.      Muscle Strength   Right Lower Extremity   Ankle Dorsiflexion:  " 5/5     Vascular Exam       Capillary Refill    Right Hip: brisk       Significant Labs: None    Significant Imaging: I have reviewed and interpreted all pertinent imaging results/findings.  Assessment/Plan:     * Closed displaced intertrochanteric fracture of right femur  1. Status post right hip open reduction internal fixation.      Start daily dressing changes to right hip incisions.  She can weightbear as tolerated right lower extremity.  Continue with physical therapy rehabilitation efforts.  Discharge plan per attending.  DVT prophylaxis per attending.  She can follow up in the outpatient clinic with Dr. Olson at 2 weeks postop for wound check and suture removal.  Patient is stable from an orthopedic perspective.          Jesus Harman PA-C  Orthopedics  formerly [facility]/Surg

## 2024-12-26 NOTE — PLAN OF CARE
Met with patient and family to discuss DC dispo. I updated them on PT recommendations of IP rehab. At first pt/family immediately shut down idea and said home HH. CM explain IP rehab in detail and explained difference between the 2. Pt/family now considering- states they will talk it over and get back me tomorrow. Okay with referral being sent in case they decide to move forward with rehab.     Referral sent to [facility] and [facility]       12/26/24 1680   Post-Acute Status   Post-Acute Authorization Placement   Post-Acute Placement Status Referrals Sent   Patient choice form signed by patient/caregiver List from [facility] Post-Acute Care

## 2024-12-26 NOTE — PT/OT/SLP EVAL
Occupational Therapy   Evaluation    Name: Doris Hoover  MRN: [id number]  Admitting Diagnosis: Closed displaced intertrochanteric fracture of right femur  Recent Surgery: Procedure(s) (LRB):  INSERTION, INTRAMEDULLARY MIKEL, FEMUR, TROCHANTER (Right) 2 Days Post-Op    Recommendations:     Discharge Recommendations: High Intensity Therapy  Discharge Equipment Recommendations:  other (see comments) (TBD)  Barriers to discharge:       Assessment:     Doris Hoover is a 64 y.o. female with a medical diagnosis of Closed displaced intertrochanteric fracture of right femur.  She presents with the following performance deficits affecting function: weakness, impaired endurance, impaired self care skills, impaired functional mobility, gait instability, impaired balance, decreased lower extremity function, decreased upper extremity function, pain, decreased ROM, orthopedic precautions.  Son present.    Rehab Prognosis: Good; patient would benefit from acute skilled OT services to address these deficits and reach maximum level of function.       Plan:     Patient to be seen 6 x/week to address the above listed problems via self-care/home management, therapeutic activities, therapeutic exercises  Plan of Care Expires: 01/23/25  Plan of Care Reviewed with: patient, son    Subjective     Chief Complaint: pain  Patient/Family Comments/goals: To get better    Occupational Profile:  Living Environment: Pt lives with her son and mother in 1 story home with 1 TAHMINA. Pt has a tub/shower and standard toilet.   Previous level of function: Independent  Roles and Routines: Daughter, mother  Equipment Used at Home: none  Assistance upon Discharge: TBD    Pain/Comfort:  Pain Rating 1: 10/10 (recently medicated)    Patients cultural, spiritual, Anglican conflicts given the current situation:      Objective:     Communicated with: Nurse Spear prior to session.  Patient found HOB elevated with bed alarm, peripheral IV, telemetry, oxygen,  PureWick upon OT entry to room.    General Precautions: Standard, fall  Orthopedic Precautions: RLE weight bearing as tolerated  Braces: N/A  Respiratory Status: Nasal cannula, flow 2 L/min    Occupational Performance:    Activities of Daily Living:  Feeding:  independence    Grooming: stand by assistance set up in sitting  Bathing: maximal assistance    Upper Body Dressing: stand by assistance set up in sitting  Lower Body Dressing: maximal assistance    Toileting: purewick in place      Cognitive/Visual Perceptual:  Pt sleepy but oriented    Physical Exam:  Upper Extremity Strength:    -       Right Upper Extremity: WFL  -       Left Upper Extremity: WFL    AMPAC 6 Click ADL:  AMPAC Total Score: 16    Treatment & Education:  OT provided education in role of OT. Patient verbalized understanding and participated in OT.  OT provided instruction in home safety with ADL/IADL including review of home set up and DME/AE. Patient/son verbalized understanding. Further training indicated.  OT provided education in use of tub transfer bench and elevation of standard toilet as needed. Pt/son verbalized understanding.  OT provided education in calling for assist. Patient verbalized understanding.        Patient left HOB elevated with all lines intact, call button in reach, bed alarm on, and son present    GOALS:   Multidisciplinary Problems       Occupational Therapy Goals          Problem: Occupational Therapy    Goal Priority Disciplines Outcome Interventions   Occupational Therapy Goal     OT, PT/OT     Description: Goals to be met by: 1/23/25     Patient will increase functional independence with ADLs by performing:    UE Dressing with Modified [place].  LE Dressing with Modified [place].  Grooming while standing at sink with Modified [place].  Toileting from toilet with Modified [place] for hygiene and clothing management.   Bathing from  shower chair/bench with Modified [place].  Toilet transfer  to toilet with Modified [place].  Increased strength and functional activity tolerance for ADL's/IADL's                         History:     Past Medical History:   Diagnosis Date    Acid reflux     Allergy     Diabetes mellitus, type 2     Hypertension          Past Surgical History:   Procedure Laterality Date     SECTION      3    HYSTERECTOMY      TONSILLECTOMY         Time Tracking:     OT Date of Treatment: 24  OT Start Time: 1355  OT Stop Time: 1403  OT Total Time (min): 8 min    Billable Minutes:Evaluation 8    2024

## 2024-12-26 NOTE — PLAN OF CARE
POC reviewed with pt. Pt verbalized understanding.     20g to left distal FA was placed.  Pain mgmt is controlled with PRN medications as ordered.  Wounds: incisions x3 to right hip area r/t hip pinning r/t hip fx. Wound care was performed this shift.  Pt continues with purewick to wall suction while in bed; however, pt was able to ambulate x2 asst to [facility] for elimination. No BM this shift. Urinated x2.   Pt continues with Accuchecks, Tele 8719 NSR, O2 3LNC,  with no adverse reactions noted.      All fall precautions maintained. Frequent checks performed per protocol. Bed in lowest position, call light within reach, slip resistant socks maintained. Bed alarm on/functioning. POC ongoing.         Problem: Adult Inpatient Plan of Care  Goal: Plan of Care Review  Outcome: Progressing  Goal: Patient-Specific Goal (Individualized)  Outcome: Progressing  Goal: Absence of Hospital-Acquired Illness or Injury  Outcome: Progressing  Goal: Optimal Comfort and Wellbeing  Outcome: Progressing  Goal: Readiness for Transition of Care  Outcome: Progressing     Problem: Diabetes Comorbidity  Goal: Blood Glucose Level Within Targeted Range  Outcome: Progressing     Problem: Skin Injury Risk Increased  Goal: Skin Health and Integrity  Outcome: Progressing     Problem: Infection  Goal: Absence of Infection Signs and Symptoms  Outcome: Progressing     Problem: Wound  Goal: Optimal Coping  Outcome: Progressing  Goal: Optimal Functional Ability  Outcome: Progressing  Goal: Absence of Infection Signs and Symptoms  Outcome: Progressing  Goal: Improved Oral Intake  Outcome: Progressing  Goal: Optimal Pain Control and Function  Outcome: Progressing  Goal: Skin Health and Integrity  Outcome: Progressing  Goal: Optimal Wound Healing  Outcome: Progressing

## 2024-12-26 NOTE — SUBJECTIVE & OBJECTIVE
Principal Problem:Closed displaced intertrochanteric fracture of right femur    Principal Orthopedic Problem:  Right hip subtrochanteric fracture    Interval History:  Status post right hip open reduction internal fixation    Review of patient's allergies indicates:   Allergen Reactions    Butorphanol tartrate Other (See Comments)     Hallucinations  hallucinations      Methylprednisolone Other (See Comments) and Shortness Of Breath     flushing  flushing         Current Facility-Administered Medications   Medication    acetaminophen tablet 650 mg    albuterol-ipratropium 2.5 mg-0.5 mg/3 mL nebulizer solution 3 mL    aluminum-magnesium hydroxide-simethicone 200-200-20 mg/5 mL suspension 30 mL    dextrose 10% bolus 125 mL 125 mL    dextrose 10% bolus 250 mL 250 mL    enoxaparin injection 40 mg    glucagon (human recombinant) injection 1 mg    glucose chewable tablet 16 g    glucose chewable tablet 24 g    HYDROmorphone injection 0.5 mg    insulin aspart U-100 pen 1-10 Units    magnesium oxide tablet 800 mg    magnesium oxide tablet 800 mg    melatonin tablet 9 mg    mupirocin 2 % ointment    naloxone 0.4 mg/mL injection 0.02 mg    ondansetron injection 4 mg    oxyCODONE-acetaminophen 5-325 mg per tablet 1 tablet    pantoprazole EC tablet 40 mg    potassium bicarbonate disintegrating tablet 35 mEq    potassium bicarbonate disintegrating tablet 50 mEq    potassium bicarbonate disintegrating tablet 60 mEq    potassium, sodium phosphates 280-160-250 mg packet 2 packet    potassium, sodium phosphates 280-160-250 mg packet 2 packet    potassium, sodium phosphates 280-160-250 mg packet 2 packet    promethazine (PHENERGAN) 12.5 mg in 0.9% NaCl 50 mL IVPB    simethicone chewable tablet 80 mg    sodium chloride 0.9% flush 10 mL     Objective:     Vital Signs (Most Recent):  Temp: 98.1 °F (36.7 °C) (12/25/24 1545)  Pulse: 88 (12/25/24 1545)  Resp: 16 (12/25/24 1545)  BP: 128/64 (12/25/24 1545)  SpO2: 96 % (12/25/24 1545) Vital  "Signs (24h Range):  Temp:  [97.9 °F (36.6 °C)-99.4 °F (37.4 °C)] 98.1 °F (36.7 °C)  Pulse:  [78-90] 88  Resp:  [16-18] 16  SpO2:  [92 %-98 %] 96 %  BP: (104-128)/(56-64) 128/64     Weight: 78.4 kg (172 lb 13.5 oz)  Height: 5' 6" (167.6 cm)  Body mass index is 27.9 kg/m².      Intake/Output Summary (Last 24 hours) at 12/25/2024 1808  Last data filed at 12/25/2024 1701  Gross per 24 hour   Intake 960 ml   Output 1400 ml   Net -440 ml        General    Constitutional: She is oriented to person, place, and time. She appears well-developed and well-nourished.   Pulmonary/Chest: Effort normal.   Neurological: She is alert and oriented to person, place, and time.   Psychiatric: She has a normal mood and affect. Her behavior is normal. Judgment and thought content normal.             Right Hip Exam     Inspection   Swelling: absent  Bruising: absent  Erythema: absent    Range of Motion   Abduction:  abnormal   Adduction:  abnormal   Extension:  abnormal   Flexion:  abnormal   External rotation:  abnormal   Internal rotation:  abnormal     Other   Sensation: normal    Comments:  Patient lying in bed comfortably in the supine position.  Dressings to right lower extremity clean dry and intact.  No surrounding erythema or ecchymosis.  No wound dehiscence or drainage.  Negative Homans on the right.  She tolerates some hip flexion and right knee flexion/extension with some discomfort.  Did not any internal/external rotation at the hip.  Right EHL is intact.      Muscle Strength   Right Lower Extremity   Ankle Dorsiflexion:  5/5     Vascular Exam       Capillary Refill    Right Hip: brisk       Significant Labs: None    Significant Imaging: I have reviewed and interpreted all pertinent imaging results/findings.  "

## 2024-12-26 NOTE — PROGRESS NOTES
[facility] Medicine  Progress Note    Patient Name: Doris Hoover  MRN: [id number]  Patient Class: IP- Inpatient   Admission Date: 12/23/2024  Length of Stay: 3 days  Attending Physician: Clemente Mahmood MD  Primary Care Provider: Brandyn Alvares IV, MD        Subjective     Principal Problem:Closed displaced intertrochanteric fracture of right femur        HPI:  Doris Hoover is a 64 year female who presented in transfer from [facility] in [place].  She was transferred for orthopedic services.  She was transferred for a displaced right intertrochanteric fracture.  She sustained mechanical fall at residence.  She does not not take anticoagulants.  No head injury or loss of consciousness.  No recent fever chills.  No known sick contacts or travel.  She describes the pain as intense.  She rates the pain at 10/10.  Pain is worse with any movement of the right leg.  Minimal relief with remaining still.  Previous medical history includes asthma, hypertension, interstitial cystitis, diabetes.  Outside facility labs reviewed.  CBC unremarkable.  Glucose 181.  Admitted to Hospital Medicine.  Plans for OR repair in a.m. with Dr. Olson.    Overview/Hospital Course:  64-year-old female with asthma, hypertension, diabetes is admitted after a fall with right displaced intertrochanteric femur fracture.  Orthopedic consulted.  Underwent intramedullary nailing 12/24.  PT/OT consulted. Lovenox for DVT prevention.     Interval History:  Seen in afternoon.  Had fever 100.4 last night.  Pain reported to be controlled.  Son bedside.  Interested in inpatient rehab.      Objective:     Vital Signs (Most Recent):  Temp: 99.1 °F (37.3 °C) (12/26/24 1225)  Pulse: 90 (12/26/24 1322)  Resp: 16 (12/26/24 1322)  BP: (!) 119/56 (12/26/24 1225)  SpO2: 95 % (12/26/24 1322) Vital Signs (24h Range):  Temp:  [98.1 °F (36.7 °C)-100.4 °F (38 °C)] 99.1 °F (37.3 °C)  Pulse:  [86-97] 90  Resp:  [16-20]  16  SpO2:  [92 %-96 %] 95 %  BP: (110-135)/(56-64) 119/56     Weight: 78.4 kg (172 lb 13.5 oz)  Body mass index is 27.9 kg/m².    Intake/Output Summary (Last 24 hours) at 12/26/2024 1540  Last data filed at 12/26/2024 1200  Gross per 24 hour   Intake 1129.4 ml   Output 400 ml   Net 729.4 ml         Physical Exam  Vitals reviewed.   Constitutional:       General: She is not in acute distress.  HENT:      Head: Normocephalic and atraumatic.   Cardiovascular:      Rate and Rhythm: Normal rate and regular rhythm.   Pulmonary:      Effort: Pulmonary effort is normal. No respiratory distress.   Musculoskeletal:      Comments: Dressings to the right lateral thigh C/D/I  RLE NVI   Neurological:      General: No focal deficit present.      Mental Status: She is alert and oriented to person, place, and time. Mental status is at baseline.   Psychiatric:         Mood and Affect: Affect normal.         Behavior: Behavior normal.         Thought Content: Thought content normal.             Significant Labs: All pertinent labs within the past 24 hours have been reviewed.    Significant Imaging: I have reviewed all pertinent imaging results/findings within the past 24 hours.    Assessment and Plan     * Closed displaced intertrochanteric fracture of right femur  Now status post intramedullary nail 12/24  -p.r.n. pain med  -follow up postop H and H  -IS  -Lovenox for DVT prophylaxis  -PT/OT, they recommend high-intensity therapy  -follow up ortho outpatient in 2 weeks postop    Acute blood loss anemia  Anemia is likely due to acute blood loss which was from fracture and surgery . Most recent hemoglobin and hematocrit are listed below.  Recent Labs     12/24/24  0451 12/25/24  0418 12/26/24  0434   HGB 10.0* 8.4* 7.8*   HCT 32.3* 26.2* 25.2*     Plan  - Monitor serial CBC: Daily  - Transfuse PRBC if patient becomes hemodynamically unstable, symptomatic or H/H drops below 7/21.  - Patient has not received any PRBC transfusions to  "date  - Patient's anemia is currently  worse  - noted hemoglobin is less than 8 in hip fracture patient, discussed with patient we will hold blood transfusion for now and consider if continues to drop  - carefully continuing prophylactic Lovenox    Postoperative fever  Attributed to atelectasis  -aggressive IS    Acute hypoxic respiratory failure  From atelectasis  -wean O2 as able   -aggressive incentive spirometry    Type 2 diabetes mellitus, without long-term current use of insulin  Patient's FSGs are controlled on current medication regimen.  Last A1c reviewed- No results found for: "LABA1C", "HGBA1C"  Most recent fingerstick glucose reviewed-   Recent Labs   Lab 12/25/24  1645 12/25/24  1959/24  0736 12/26/24  1144   POCTGLUCOSE 147* 275* 174* 164*       Current correctional scale  Medium  Maintain anti-hyperglycemic dose as follows-   Antihyperglycemics (From admission, onward)      Start     Stop Route Frequency Ordered    12/23/24 1802  insulin aspart U-100 pen 1-10 Units         -- SubQ Before meals & nightly PRN 12/23/24 1703          Hold Oral hypoglycemics while patient is in the hospital.     Essential hypertension  Holding stable  -continue to hold her amlodipine and atenolol for now and reassess      VTE Risk Mitigation (From admission, onward)           Ordered     enoxaparin injection 40 mg  Every 24 hours         12/24/24 0814     IP VTE HIGH RISK PATIENT  Once         12/23/24 1703     Place sequential compression device  Until discontinued         12/23/24 1703     Place JIMMY hose  Until discontinued         12/23/24 1703                    Discharge Planning   PETER: 12/27/2024     Code Status: Full Code   Medical Readiness for Discharge Date:   Discharge Plan A: Home Health               Clemente Mahmood MD  Department of Hospital Medicine   [facility] - Med/Surg    "

## 2024-12-26 NOTE — NURSING
16 Fr montero removed per MD order. Pt tolerated well. Purewick applied to patient. Pt DTV [id number].    calm

## 2024-12-26 NOTE — ASSESSMENT & PLAN NOTE
Anemia is likely due to acute blood loss which was from fracture and surgery . Most recent hemoglobin and hematocrit are listed below.  Recent Labs     12/24/24  0451 12/25/24  0418 12/26/24  0434   HGB 10.0* 8.4* 7.8*   HCT 32.3* 26.2* 25.2*     Plan  - Monitor serial CBC: Daily  - Transfuse PRBC if patient becomes hemodynamically unstable, symptomatic or H/H drops below 7/21.  - Patient has not received any PRBC transfusions to date  - Patient's anemia is currently  worse  - noted hemoglobin is less than 8 in hip fracture patient, discussed with patient we will hold blood transfusion for now and consider if continues to drop  - carefully continuing prophylactic Lovenox

## 2024-12-27 PROBLEM — D69.6 THROMBOCYTOPENIA: Status: ACTIVE | Noted: 2024-12-27

## 2024-12-27 PROBLEM — R09.02 HYPOXIA: Status: ACTIVE | Noted: 2022-12-30

## 2024-12-27 LAB
ABO + RH BLD: NORMAL
ANION GAP SERPL CALC-SCNC: 8 MMOL/L (ref 8–16)
BLD GP AB SCN CELLS X3 SERPL QL: NORMAL
BLD PROD TYP BPU: NORMAL
BLOOD UNIT EXPIRATION DATE: NORMAL
BLOOD UNIT TYPE CODE: 5100
BLOOD UNIT TYPE: NORMAL
BUN SERPL-MCNC: 9 MG/DL (ref 8–23)
CALCIUM SERPL-MCNC: 8.4 MG/DL (ref 8.7–10.5)
CHLORIDE SERPL-SCNC: 100 MMOL/L (ref 95–110)
CO2 SERPL-SCNC: 29 MMOL/L (ref 23–29)
CODING SYSTEM: NORMAL
CREAT SERPL-MCNC: 0.6 MG/DL (ref 0.5–1.4)
CROSSMATCH INTERPRETATION: NORMAL
DISPENSE STATUS: NORMAL
ERYTHROCYTE [DISTWIDTH] IN BLOOD BY AUTOMATED COUNT: 13.3 % (ref 11.5–14.5)
EST. GFR  (NO RACE VARIABLE): >60 ML/MIN/1.73 M^2
GLUCOSE SERPL-MCNC: 134 MG/DL (ref 70–110)
HCT VFR BLD AUTO: 22.5 % (ref 37–48.5)
HGB BLD-MCNC: 7.1 G/DL (ref 12–16)
MCH RBC QN AUTO: 30.1 PG (ref 27–31)
MCHC RBC AUTO-ENTMCNC: 31.6 G/DL (ref 32–36)
MCV RBC AUTO: 95 FL (ref 82–98)
NUM UNITS TRANS PACKED RBC: NORMAL
PLATELET # BLD AUTO: 141 K/UL (ref 150–450)
PMV BLD AUTO: 10.8 FL (ref 9.2–12.9)
POCT GLUCOSE: 145 MG/DL (ref 70–110)
POCT GLUCOSE: 147 MG/DL (ref 70–110)
POCT GLUCOSE: 151 MG/DL (ref 70–110)
POTASSIUM SERPL-SCNC: 3.9 MMOL/L (ref 3.5–5.1)
RBC # BLD AUTO: 2.36 M/UL (ref 4–5.4)
SODIUM SERPL-SCNC: 137 MMOL/L (ref 136–145)
SPECIMEN OUTDATE: NORMAL
WBC # BLD AUTO: 7.47 K/UL (ref 3.9–12.7)

## 2024-12-27 PROCEDURE — 99900035 HC TECH TIME PER 15 MIN (STAT)

## 2024-12-27 PROCEDURE — 97530 THERAPEUTIC ACTIVITIES: CPT

## 2024-12-27 PROCEDURE — 86901 BLOOD TYPING SEROLOGIC RH(D): CPT | Performed by: STUDENT IN AN ORGANIZED HEALTH CARE EDUCATION/TRAINING PROGRAM

## 2024-12-27 PROCEDURE — 94799 UNLISTED PULMONARY SVC/PX: CPT

## 2024-12-27 PROCEDURE — 25000003 PHARM REV CODE 250: Performed by: ORTHOPAEDIC SURGERY

## 2024-12-27 PROCEDURE — 25000003 PHARM REV CODE 250: Performed by: STUDENT IN AN ORGANIZED HEALTH CARE EDUCATION/TRAINING PROGRAM

## 2024-12-27 PROCEDURE — 63600175 PHARM REV CODE 636 W HCPCS: Performed by: STUDENT IN AN ORGANIZED HEALTH CARE EDUCATION/TRAINING PROGRAM

## 2024-12-27 PROCEDURE — 36415 COLL VENOUS BLD VENIPUNCTURE: CPT | Performed by: STUDENT IN AN ORGANIZED HEALTH CARE EDUCATION/TRAINING PROGRAM

## 2024-12-27 PROCEDURE — 94761 N-INVAS EAR/PLS OXIMETRY MLT: CPT

## 2024-12-27 PROCEDURE — P9016 RBC LEUKOCYTES REDUCED: HCPCS | Performed by: STUDENT IN AN ORGANIZED HEALTH CARE EDUCATION/TRAINING PROGRAM

## 2024-12-27 PROCEDURE — 97535 SELF CARE MNGMENT TRAINING: CPT

## 2024-12-27 PROCEDURE — 97116 GAIT TRAINING THERAPY: CPT

## 2024-12-27 PROCEDURE — 80048 BASIC METABOLIC PNL TOTAL CA: CPT | Performed by: ORTHOPAEDIC SURGERY

## 2024-12-27 PROCEDURE — 11000001 HC ACUTE MED/SURG PRIVATE ROOM

## 2024-12-27 PROCEDURE — 27000221 HC OXYGEN, UP TO 24 HOURS

## 2024-12-27 PROCEDURE — 85027 COMPLETE CBC AUTOMATED: CPT | Performed by: ORTHOPAEDIC SURGERY

## 2024-12-27 PROCEDURE — 36415 COLL VENOUS BLD VENIPUNCTURE: CPT | Performed by: ORTHOPAEDIC SURGERY

## 2024-12-27 PROCEDURE — 25000003 PHARM REV CODE 250: Performed by: NURSE PRACTITIONER

## 2024-12-27 PROCEDURE — 36430 TRANSFUSION BLD/BLD COMPNT: CPT

## 2024-12-27 PROCEDURE — 86920 COMPATIBILITY TEST SPIN: CPT | Performed by: STUDENT IN AN ORGANIZED HEALTH CARE EDUCATION/TRAINING PROGRAM

## 2024-12-27 RX ORDER — ENOXAPARIN SODIUM 100 MG/ML
40 INJECTION SUBCUTANEOUS EVERY 24 HOURS
Status: DISCONTINUED | OUTPATIENT
Start: 2024-12-28 | End: 2024-12-30 | Stop reason: HOSPADM

## 2024-12-27 RX ORDER — HYDROCODONE BITARTRATE AND ACETAMINOPHEN 500; 5 MG/1; MG/1
TABLET ORAL
Status: DISCONTINUED | OUTPATIENT
Start: 2024-12-27 | End: 2024-12-30 | Stop reason: HOSPADM

## 2024-12-27 RX ADMIN — MUPIROCIN 1 G: 20 OINTMENT TOPICAL at 08:12

## 2024-12-27 RX ADMIN — OXYCODONE HYDROCHLORIDE AND ACETAMINOPHEN 1 TABLET: 5; 325 TABLET ORAL at 09:12

## 2024-12-27 RX ADMIN — HYDROMORPHONE HYDROCHLORIDE 0.5 MG: 1 INJECTION, SOLUTION INTRAMUSCULAR; INTRAVENOUS; SUBCUTANEOUS at 05:12

## 2024-12-27 RX ADMIN — PANTOPRAZOLE SODIUM 40 MG: 40 TABLET, DELAYED RELEASE ORAL at 09:12

## 2024-12-27 RX ADMIN — OXYCODONE HYDROCHLORIDE AND ACETAMINOPHEN 1 TABLET: 5; 325 TABLET ORAL at 03:12

## 2024-12-27 RX ADMIN — HYDROMORPHONE HYDROCHLORIDE 0.5 MG: 1 INJECTION, SOLUTION INTRAMUSCULAR; INTRAVENOUS; SUBCUTANEOUS at 04:12

## 2024-12-27 RX ADMIN — OXYCODONE HYDROCHLORIDE AND ACETAMINOPHEN 1 TABLET: 5; 325 TABLET ORAL at 12:12

## 2024-12-27 RX ADMIN — MUPIROCIN 1 G: 20 OINTMENT TOPICAL at 09:12

## 2024-12-27 NOTE — CARE UPDATE
12/27/24 0735   Patient Assessment/Suction   Level of Consciousness (AVPU) alert   Respiratory Effort Normal;Unlabored   Expansion/Accessory Muscles/Retractions no use of accessory muscles;expansion symmetric;no retractions   Rhythm/Pattern, Respiratory pattern regular;unlabored;depth regular;no shortness of breath reported   PRE-TX-O2   Device (Oxygen Therapy) nasal cannula   $ Is the patient on Low Flow Oxygen? Yes   Flow (L/min) (Oxygen Therapy) 2   SpO2 96 %   Pulse Oximetry Type Intermittent   $ Pulse Oximetry - Multiple Charge Pulse Oximetry - Multiple   Pulse 80   Resp 18   Aerosol Therapy   $ Aerosol Therapy Charges PRN treatment not required   Respiratory Treatment Status (SVN) PRN treatment not required   Incentive Spirometer   $ Incentive Spirometer Charges done with encouragement   Incentive Spirometer Predicted Level (mL) 1880   Administration (IS) mouthpiece utilized   Number of Repetitions (IS) 4   Level Incentive Spirometer (mL) 2000   Patient Tolerance (IS) good;no adverse signs/symptoms present

## 2024-12-27 NOTE — PT/OT/SLP PROGRESS
Occupational Therapy   Treatment    Name: Doris Hoover  MRN: [id number]  Admitting Diagnosis:  Closed displaced intertrochanteric fracture of right femur  3 Days Post-Op    Recommendations:     Discharge Recommendations: High Intensity Therapy  Discharge Equipment Recommendations:  other (see comments) (TBD)  Barriers to discharge:       Assessment:     Doris Hoover is a 64 y.o. female with a medical diagnosis of Closed displaced intertrochanteric fracture of right femur.  She presents with the following performance deficits affecting function are weakness, impaired endurance, impaired self care skills, impaired functional mobility, gait instability, impaired balance, decreased lower extremity function, decreased ROM, pain, orthopedic precautions. Pt up in chair and agreeable to OT. OT provided instruction in use of reacher and sock aid for lower body dressing. Pt was able to jah/doff socks with reacher and sock aid with Min assist after demonstration. OT provided education in calling for assist. Pt verbalized understanding.    Rehab Prognosis:  Good; patient would benefit from acute skilled OT services to address these deficits and reach maximum level of function.       Plan:     Patient to be seen 6 x/week to address the above listed problems via self-care/home management, therapeutic exercises, therapeutic activities  Plan of Care Expires: 01/23/25  Plan of Care Reviewed with: patient    Subjective     Chief Complaint: pain  Patient/Family Comments/goals: To get better  Pain/Comfort:  Pain Rating 1:  (not rated)    Objective:     Communicated with: Nurse Aguilera prior to session.  Patient found up in chair with chair check, peripheral IV upon OT entry to room.    General Precautions: Standard, fall    Orthopedic Precautions:RLE weight bearing as tolerated  Braces: N/A  Respiratory Status: Room air     Occupational Performance:       Activities of Daily Living:  Lower Body Dressing: Pt was able to jah/doff R &  L socks with reacher and sock aid with Min assist after demonstration.        [facility] 6 Click ADL:      Treatment & Education:  OT provided education in role of OT. Patient verbalized understanding and participated in OT.  OT provided education in calling for assist. Patient verbalized understanding.  OT provided instruction in use of reacher and sock aid for lower body dressing. Pt verbalized understanding and was able to jah/doff R & L socks with reacher and sock aid with Min assist after instruction.    Patient left up in chair with all lines intact, call button in reach, and chair alarm on    GOALS:   Multidisciplinary Problems       Occupational Therapy Goals          Problem: Occupational Therapy    Goal Priority Disciplines Outcome Interventions   Occupational Therapy Goal     OT, PT/OT Progressing    Description: Goals to be met by: 1/23/25     Patient will increase functional independence with ADLs by performing:    UE Dressing with Modified [place].  LE Dressing with Modified [place].  Grooming while standing at sink with Modified [place].  Toileting from toilet with Modified [place] for hygiene and clothing management.   Bathing from  shower chair/bench with Modified [place].  Toilet transfer to toilet with Modified [place].  Increased strength and functional activity tolerance for ADL's/IADL's                         Time Tracking:     OT Date of Treatment: 12/27/24  OT Start Time: 1123  OT Stop Time: 1146  OT Total Time (min): 23 min    Billable Minutes:Self Care/Home Management 23    OT/KELSEY: OT          12/27/2024

## 2024-12-27 NOTE — PHYSICIAN QUERY
Due to the conflicting clinical picture, please clinically validate the Acute hypoxic respiratory failure . If validated, please provide additional clinical support for the diagnosis.  Other: Hypoxia

## 2024-12-27 NOTE — ASSESSMENT & PLAN NOTE
Anemia is likely due to acute blood loss which was from fracture and surgery . Most recent hemoglobin and hematocrit are listed below.  Recent Labs     12/25/24  0418 12/26/24  0434 12/27/24  0528   HGB 8.4* 7.8* 7.1*   HCT 26.2* 25.2* 22.5*       Plan  - Monitor serial CBC: Daily  - Transfuse PRBC if patient becomes hemodynamically unstable, symptomatic or hemoglobin drops below 8  - Patient has not received any PRBC transfusions to date  - Patient's anemia is currently  worse  - transfuse 1 unit packed red blood cell   - hold prophylactic Lovenox today

## 2024-12-27 NOTE — ASSESSMENT & PLAN NOTE
"Patient's FSGs are controlled on current medication regimen.  Last A1c reviewed- No results found for: "LABA1C", "HGBA1C"  Most recent fingerstick glucose reviewed-   Recent Labs   Lab 12/26/24  1144 12/26/24  1605 12/26/24  1924 12/27/24  0749   POCTGLUCOSE 164* 148* 231* 147*       Current correctional scale  Medium  Maintain anti-hyperglycemic dose as follows-   Antihyperglycemics (From admission, onward)    Start     Stop Route Frequency Ordered    12/23/24 1802  insulin aspart U-100 pen 1-10 Units         -- SubQ Before meals & nightly PRN 12/23/24 1703        Hold Oral hypoglycemics while patient is in the hospital.   "

## 2024-12-27 NOTE — PLAN OF CARE
Problem: Physical Therapy  Goal: Physical Therapy Goal  Description: Goals to be met by: 01-     Patient will increase functional independence with mobility by performin. Supine to sit with MInimal Assistance  2. Sit to stand transfer with Minimal Assistance  3. Bed to chair transfer with Minimal Assistance using Rolling Walker  4. Gait  x 50 feet with Minimal Assistance using Rolling Walker.   5. Lower extremity exercise program x20 reps    RLE WBAT post IM nailing 2024    Outcome: Progressing   Pt c/o weakness- ambulated ~5ft to chair min/mod assist. HIT. Pt to transfuse blood

## 2024-12-27 NOTE — PLAN OF CARE
Problem: Occupational Therapy  Goal: Occupational Therapy Goal  Description: Goals to be met by: 1/23/25     Patient will increase functional independence with ADLs by performing:    UE Dressing with Modified [place].  LE Dressing with Modified [place].  Grooming while standing at sink with Modified [place].  Toileting from toilet with Modified [place] for hygiene and clothing management.   Bathing from  shower chair/bench with Modified [place].  Toilet transfer to toilet with Modified [place].  Increased strength and functional activity tolerance for ADL's/IADL's    Outcome: Progressing  Continue with POC

## 2024-12-27 NOTE — ASSESSMENT & PLAN NOTE
From atelectasis.  -wean O2 as able, was on room air on rounds 12/27  -aggressive incentive spirometry

## 2024-12-27 NOTE — PT/OT/SLP PROGRESS
Physical Therapy Treatment    Patient Name:  Doris Hoover   MRN:  [id number]    Recommendations:     Discharge Recommendations: High Intensity Therapy  Discharge Equipment Recommendations: none  Barriers to discharge: Decreased caregiver support    Assessment:     Doris Hoover is a 64 y.o. female admitted with a medical diagnosis of Closed displaced intertrochanteric fracture of right femur.  She presents with the following impairments/functional limitations: weakness, impaired endurance, impaired functional mobility, gait instability, impaired balance, decreased lower extremity function, pain, decreased ROM, impaired cardiopulmonary response to activity, orthopedic precautions .    Pt seen supine in bed- stated has been getting OOB to commode chair with family or nursing. Pt stated of being weak today. Pt requiring min/mod assist mobility to sit EOB. Stood with RW and ambulated 5 ft with RW to chair and reclined.  Pt to transfuse x 1 unit blood with H/H 7.1/22  HIT.    Rehab Prognosis: Good; patient would benefit from acute skilled PT services to address these deficits and reach maximum level of function.    Recent Surgery: Procedure(s) (LRB):  INSERTION, INTRAMEDULLARY MIKEL, FEMUR, TROCHANTER (Right) 3 Days Post-Op    Plan:     During this hospitalization, patient to be seen daily to address the identified rehab impairments via gait training, therapeutic activities, therapeutic exercises and progress toward the following goals:    Plan of Care Expires:  01/15/25    Subjective   Stated that  she has been doing her exercises as previously instructed  Chief Complaint: weakness, a little dizzy  Patient/Family Comments/goals: get well  Pain/Comfort:  Pain Rating 1:  (not rated)  Location - Side 1: Right  Location 1: hip  Pain Addressed 1: Pre-medicate for activity, Reposition, Distraction, Cessation of Activity, Nurse notified      Objective:     Communicated with nurse Aguilera prior to session.  Patient found HOB  elevated with telemetry, bed alarm upon PT entry to room.     General Precautions: Standard, fall  Orthopedic Precautions: RLE weight bearing as tolerated  Braces: N/A  Respiratory Status: Room air     Functional Mobility:  Bed Mobility:     Scooting: moderate assistance  Supine to Sit: moderate assistance  Transfers:     Sit to Stand:  minimum assistance and moderate assistance with rolling walker  Bed to Chair: minimum assistance and moderate assistance with  rolling walker  using  Stand Pivot  Gait: 5ft with RW WBAT RLE. Pt with early fatigue and requiring sitting      AM-PAC 6 CLICK MOBILITY          Treatment & Education:  Patient was educated on the importance of OOB activity and functional mobility to negate negative effects of prolonged bed rest during hospitalization, safe transfers and ambulation, and D/C planning   Repositioned in chair and reclined    Patient left up in chair with all lines intact, call button in reach, chair alarm on, and daughter present..    GOALS:   Multidisciplinary Problems       Physical Therapy Goals          Problem: Physical Therapy    Goal Priority Disciplines Outcome Interventions   Physical Therapy Goal     PT, PT/OT Progressing    Description: Goals to be met by: 01-     Patient will increase functional independence with mobility by performin. Supine to sit with MInimal Assistance  2. Sit to stand transfer with Minimal Assistance  3. Bed to chair transfer with Minimal Assistance using Rolling Walker  4. Gait  x 50 feet with Minimal Assistance using Rolling Walker.   5. Lower extremity exercise program x20 reps    RLE WBAT post IM nailing 2024                         Time Tracking:     PT Received On: 24  PT Start Time: 1001     PT Stop Time: 1024  PT Total Time (min): 23 min     Billable Minutes: Gait Training 10 and Therapeutic Activity 13    Treatment Type: Treatment  PT/PTA: PT     Number of PTA visits since last PT visit: 0     2024

## 2024-12-27 NOTE — SUBJECTIVE & OBJECTIVE
Interval History:  Seen on a.m. rounds.  Pain is little better controlled.  Worked with therapy but did report dizziness after recent pain med administration.  Ordered for 1 unit packed red cell transfusion today.  Denies shortness of breath      Objective:     Vital Signs (Most Recent):  Temp: 98.6 °F (37 °C) (12/27/24 0747)  Pulse: 81 (12/27/24 0747)  Resp: 18 (12/27/24 0909)  BP: 115/62 (12/27/24 0747)  SpO2: 95 % (12/27/24 0747) Vital Signs (24h Range):  Temp:  [98.3 °F (36.8 °C)-99.7 °F (37.6 °C)] 98.6 °F (37 °C)  Pulse:  [80-96] 81  Resp:  [14-19] 18  SpO2:  [95 %-96 %] 95 %  BP: (115-119)/(56-73) 115/62     Weight: 82.4 kg (181 lb 10.5 oz)  Body mass index is 29.32 kg/m².    Intake/Output Summary (Last 24 hours) at 12/27/2024 1127  Last data filed at 12/27/2024 1038  Gross per 24 hour   Intake 960 ml   Output 1300 ml   Net -340 ml         Physical Exam  Vitals reviewed.   Constitutional:       General: She is not in acute distress.  HENT:      Head: Normocephalic and atraumatic.   Cardiovascular:      Rate and Rhythm: Normal rate and regular rhythm.   Pulmonary:      Effort: Pulmonary effort is normal. No respiratory distress.   Musculoskeletal:      Comments: Dressings to the right lateral thigh C/D/I  RLE NVI   Neurological:      General: No focal deficit present.      Mental Status: She is alert and oriented to person, place, and time. Mental status is at baseline.   Psychiatric:         Mood and Affect: Affect normal.         Behavior: Behavior normal.         Thought Content: Thought content normal.             Significant Labs: All pertinent labs within the past 24 hours have been reviewed.    Significant Imaging: I have reviewed all pertinent imaging results/findings within the past 24 hours.

## 2024-12-27 NOTE — ASSESSMENT & PLAN NOTE
The likely etiology of thrombocytopenia is platelet consumption from fracture and surgery . The patients 3 most recent labs are listed below.  Recent Labs     12/25/24  0418 12/26/24  0434 12/27/24  0528   * 126* 141*     Plan  - Will transfuse if platelet count is <10k.  - monitor on CBC daily

## 2024-12-27 NOTE — PLAN OF CARE
Problem: Adult Inpatient Plan of Care  Goal: Plan of Care Review  Outcome: Not Progressing  Goal: Patient-Specific Goal (Individualized)  Outcome: Not Progressing  Goal: Absence of Hospital-Acquired Illness or Injury  Outcome: Not Progressing  Goal: Optimal Comfort and Wellbeing  Outcome: Not Progressing  Goal: Readiness for Transition of Care  Outcome: Not Progressing     Problem: Diabetes Comorbidity  Goal: Blood Glucose Level Within Targeted Range  Outcome: Not Progressing     Problem: Skin Injury Risk Increased  Goal: Skin Health and Integrity  Outcome: Not Progressing     Problem: Infection  Goal: Absence of Infection Signs and Symptoms  Outcome: Not Progressing     Problem: Wound  Goal: Optimal Coping  Outcome: Not Progressing  Goal: Optimal Functional Ability  Outcome: Not Progressing  Goal: Absence of Infection Signs and Symptoms  Outcome: Not Progressing  Goal: Improved Oral Intake  Outcome: Not Progressing  Goal: Optimal Pain Control and Function  Outcome: Not Progressing  Goal: Skin Health and Integrity  Outcome: Not Progressing  Goal: Optimal Wound Healing  Outcome: Not Progressing     Problem: Fall Injury Risk  Goal: Absence of Fall and Fall-Related Injury  Outcome: Not Progressing

## 2024-12-27 NOTE — PROGRESS NOTES
[facility] Medicine  Progress Note    Patient Name: Doris Hoover  MRN: [id number]  Patient Class: IP- Inpatient   Admission Date: 12/23/2024  Length of Stay: 4 days  Attending Physician: Clemente Mahmood MD  Primary Care Provider: Brandyn Alvares IV, MD        Subjective     Principal Problem:Closed displaced intertrochanteric fracture of right femur        HPI:  Doris Hoover is a 64 year female who presented in transfer from [facility] in [place].  She was transferred for orthopedic services.  She was transferred for a displaced right intertrochanteric fracture.  She sustained mechanical fall at residence.  She does not not take anticoagulants.  No head injury or loss of consciousness.  No recent fever chills.  No known sick contacts or travel.  She describes the pain as intense.  She rates the pain at 10/10.  Pain is worse with any movement of the right leg.  Minimal relief with remaining still.  Previous medical history includes asthma, hypertension, interstitial cystitis, diabetes.  Outside facility labs reviewed.  CBC unremarkable.  Glucose 181.  Admitted to Hospital Medicine.  Plans for OR repair in a.m. with Dr. Olson.    Overview/Hospital Course:  64-year-old female with asthma, hypertension, diabetes is admitted after a fall with right displaced intertrochanteric femur fracture.  Orthopedic consulted.  Underwent intramedullary nailing 12/24.  PT/OT consulted. Lovenox for DVT prevention.  1 unit packed red blood cell ordered 12/27.    Interval History:  Seen on a.m. rounds.  Pain is little better controlled.  Worked with therapy but did report dizziness after recent pain med administration.  Ordered for 1 unit packed red cell transfusion today.  Denies shortness of breath      Objective:     Vital Signs (Most Recent):  Temp: 98.6 °F (37 °C) (12/27/24 0747)  Pulse: 81 (12/27/24 0747)  Resp: 18 (12/27/24 0909)  BP: 115/62 (12/27/24 0747)  SpO2: 95 % (12/27/24  0747) Vital Signs (24h Range):  Temp:  [98.3 °F (36.8 °C)-99.7 °F (37.6 °C)] 98.6 °F (37 °C)  Pulse:  [80-96] 81  Resp:  [14-19] 18  SpO2:  [95 %-96 %] 95 %  BP: (115-119)/(56-73) 115/62     Weight: 82.4 kg (181 lb 10.5 oz)  Body mass index is 29.32 kg/m².    Intake/Output Summary (Last 24 hours) at 12/27/2024 1127  Last data filed at 12/27/2024 1038  Gross per 24 hour   Intake 960 ml   Output 1300 ml   Net -340 ml         Physical Exam  Vitals reviewed.   Constitutional:       General: She is not in acute distress.  HENT:      Head: Normocephalic and atraumatic.   Cardiovascular:      Rate and Rhythm: Normal rate and regular rhythm.   Pulmonary:      Effort: Pulmonary effort is normal. No respiratory distress.   Musculoskeletal:      Comments: Dressings to the right lateral thigh C/D/I  RLE NVI   Neurological:      General: No focal deficit present.      Mental Status: She is alert and oriented to person, place, and time. Mental status is at baseline.   Psychiatric:         Mood and Affect: Affect normal.         Behavior: Behavior normal.         Thought Content: Thought content normal.             Significant Labs: All pertinent labs within the past 24 hours have been reviewed.    Significant Imaging: I have reviewed all pertinent imaging results/findings within the past 24 hours.    Assessment and Plan     * Closed displaced intertrochanteric fracture of right femur  Now status post intramedullary nail 12/24  -p.r.n. pain med  -follow up postop H and H  -IS  -Lovenox for DVT prophylaxis  -PT/OT, they recommend high-intensity therapy  -follow up ortho outpatient in 2 weeks postop    Thrombocytopenia  The likely etiology of thrombocytopenia is platelet consumption from fracture and surgery . The patients 3 most recent labs are listed below.  Recent Labs     12/25/24  0418 12/26/24  0434 12/27/24  0528   * 126* 141*     Plan  - Will transfuse if platelet count is <10k.  - monitor on CBC daily    Acute blood  "loss anemia  Anemia is likely due to acute blood loss which was from fracture and surgery . Most recent hemoglobin and hematocrit are listed below.  Recent Labs     12/25/24  0418 12/26/24  0434 12/27/24  0528   HGB 8.4* 7.8* 7.1*   HCT 26.2* 25.2* 22.5*       Plan  - Monitor serial CBC: Daily  - Transfuse PRBC if patient becomes hemodynamically unstable, symptomatic or hemoglobin drops below 8  - Patient has not received any PRBC transfusions to date  - Patient's anemia is currently  worse  - transfuse 1 unit packed red blood cell   - hold prophylactic Lovenox today    Postoperative fever  Attributed to atelectasis  -aggressive IS    Hypoxia  From atelectasis.  -wean O2 as able, was on room air on rounds 12/27  -aggressive incentive spirometry    Type 2 diabetes mellitus, without long-term current use of insulin  Patient's FSGs are controlled on current medication regimen.  Last A1c reviewed- No results found for: "LABA1C", "HGBA1C"  Most recent fingerstick glucose reviewed-   Recent Labs   Lab 12/26/24  1144 12/26/24  1605 12/26/24  1924 12/27/24  0749   POCTGLUCOSE 164* 148* 231* 147*       Current correctional scale  Medium  Maintain anti-hyperglycemic dose as follows-   Antihyperglycemics (From admission, onward)      Start     Stop Route Frequency Ordered    12/23/24 1802  insulin aspart U-100 pen 1-10 Units         -- SubQ Before meals & nightly PRN 12/23/24 1703          Hold Oral hypoglycemics while patient is in the hospital.     Essential hypertension  Holding stable  -continue to hold her amlodipine and atenolol for now and reassess      VTE Risk Mitigation (From admission, onward)           Ordered     enoxaparin injection 40 mg  Every 24 hours         12/27/24 0843     IP VTE HIGH RISK PATIENT  Once         12/23/24 1703     Place sequential compression device  Until discontinued         12/23/24 1703     Place JIMMY hose  Until discontinued         12/23/24 1703                    Discharge Planning "   PETER: 12/30/2024     Code Status: Full Code   Medical Readiness for Discharge Date:   Discharge Plan A: Home Health                 Clemente Mahmood MD  Department of Hospital Medicine   [facility]/Surg

## 2024-12-27 NOTE — PLAN OF CARE
Pt in agreement with [facility] after meeting Keiko.   [facility]iko will submit today for admit Monday pending stable H/H and off IV pain meds         12/27/24 1216   Post-Acute Status   Post-Acute Authorization Placement   Post-Acute Placement Status Pending payor review/awaiting authorization (if required)

## 2024-12-27 NOTE — PLAN OF CARE
Nutrition Plan of Care:    Recommendations     1. Continue on a diabetic, consistent carbohydrate modified diet   2. Recommend protein supplement: Beneprotein or ProMod BID, once NPO status advances to promote post op healing    3. Monitor labs and weights     Goals:  Patient to advance with nutrition therapy, oral diet prior to 48 hours     Nutrition Goal Status: progressing   Communication of RD Recs: other (comment) (consult, poc)     Assessment and Plan     Nutrition Problem  Inadequate oral intake      Related to (etiology):   NPO status due to closed displaced intertrochanteric fracture of right femur     Signs and Symptoms (as evidenced by):  Surgery         Interventions (treatment strategy):  Consistent Carbohydrate modified diet   Modified beverage medical food supplement therapy      Nutrition Diagnosis Status:   Improving       Bebe Valenzuela, MS, RDN, LDN

## 2024-12-27 NOTE — PROGRESS NOTES
[facility]/Surg  Adult Nutrition  Progress Note    SUMMARY     Recommendations    1. Continue on a diabetic, consistent carbohydrate modified diet   2. Recommend protein supplement: Beneprotein or ProMod BID, once NPO status advances to promote post op healing    3. Monitor labs and weights    Goals:  Patient to advance with nutrition therapy, oral diet prior to 48 hours    Nutrition Goal Status: progressing   Communication of RD Recs: other (comment) (consult, poc)    Assessment and Plan    Nutrition Problem  Inadequate oral intake     Related to (etiology):   NPO status due to closed displaced intertrochanteric fracture of right femur    Signs and Symptoms (as evidenced by):  Surgery       Interventions (treatment strategy):  Consistent Carbohydrate modified diet   Modified beverage medical food supplement therapy     Nutrition Diagnosis Status:   Improving         Malnutrition Assessment           Patient does not meet positive criteria of NFPE at this time.  RD to continue to monitor for nutrition risks at follow up visits.                             Reason for Assessment    Reason For Assessment: consult (Malnutrition consult)    Diagnosis:  (closed displaced intertrochanteric fracture of right femur)  Patient Active Problem List   Diagnosis    Acute pain of right shoulder    Asthma    Chronic interstitial cystitis    Essential hypertension    Gastroesophageal reflux disease without esophagitis    Microscopic hematuria    MVP (mitral valve prolapse)    Perennial non-allergic rhinitis    Pulmonary nodule    Type 2 diabetes mellitus, without long-term current use of insulin    Hypoxia    Hypoxemia requiring supplemental oxygen    Closed displaced intertrochanteric fracture of right femur    Postoperative fever    Acute blood loss anemia    Thrombocytopenia     Past Medical History:   Diagnosis Date    Acid reflux     Allergy     Diabetes mellitus, type 2     Hypertension        General  "Information Comments:   12/24/2024: Patient is currenly NPO for surgical procedure of closed displaced intertrochanteric fracture of right femur.  PMH of Diabetes.  RD recommends diet advancement when appropriate to a consistent carbohydrate, diabetic oral diet.  Protein supplement recommended to promote post op healing.  Labs reviewed.  NKFA.  LBM: 12/23/2024.  No significant weight loss reported or found in medical chart weight history.  Patient does not meet positive criteria of NFPE at this time.  RD to continue to monitor NPO status, diet advancement and po intake tolerance.  12/27/24: Patient is now on a diabetic oral diet with fair intake noted between 50-75% meal consumption.  No GI tolerance issues reported.  Labs reviewed.  NKFA.  LBM: 12/27/2024.  RD to continue to encourage po intake and compliance to recommended diabetic diet.     Nutrition Discharge Planning: Consistent carbohydrate modified diet    Nutrition Risk Screen     Nutrition Related Social Determinants of Health: SDOH: None Identified      Nutrition/Diet History    Spiritual, Cultural Beliefs, Religion Practices, Values that Affect Care: no  Food Allergies: NKFA  Factors Affecting Nutritional Intake: None identified at this time    Anthropometrics    Temp: 98.7 °F (37.1 °C)  Height Method: Stated  Height: 5' 6" (167.6 cm)  Height (inches): 66 in  Weight Method: Bed Scale  Weight: 82.4 kg (181 lb 10.5 oz)  Weight (lb): 181.66 lb  Ideal Body Weight (IBW), Female: 130 lb  % Ideal Body Weight, Female (lb): 132.95 %  BMI (Calculated): 29.3  BMI Grade: 25 - 29.9 - overweight       Lab/Procedures/Meds    Pertinent Labs Reviewed: reviewed  BMP  Lab Results   Component Value Date     12/27/2024    K 3.9 12/27/2024     12/27/2024    CO2 29 12/27/2024    BUN 9 12/27/2024    CREATININE 0.6 12/27/2024    CALCIUM 8.4 (L) 12/27/2024    ANIONGAP 8 12/27/2024    EGFRNORACEVR >60 12/27/2024     No results found for: "LABA1C", "HGBA1C"  Lab Results "   Component Value Date    CALCIUM 8.4 (L) 12/27/2024    PHOS 3.3 12/24/2024     Glucose   Date Value Ref Range Status   12/27/2024 134 (H) 70 - 110 mg/dL Final         Pertinent Medications Reviewed: reviewed  Scheduled Meds:   [START ON 12/28/2024] enoxparin  40 mg Subcutaneous Q24H (prophylaxis, 1700)    mupirocin   Nasal BID    pantoprazole  40 mg Oral Daily     Continuous Infusions:  PRN Meds:.  Current Facility-Administered Medications:     0.9%  NaCl infusion (for blood administration), , Intravenous, Q24H PRN    acetaminophen, 650 mg, Oral, Q6H PRN    albuterol-ipratropium, 3 mL, Nebulization, Q4H PRN    aluminum-magnesium hydroxide-simethicone, 30 mL, Oral, QID PRN    dextrose 10%, 12.5 g, Intravenous, PRN    dextrose 10%, 25 g, Intravenous, PRN    glucagon (human recombinant), 1 mg, Intramuscular, PRN    glucose, 16 g, Oral, PRN    glucose, 24 g, Oral, PRN    HYDROmorphone, 0.5 mg, Intravenous, Q4H PRN    insulin aspart U-100, 1-10 Units, Subcutaneous, QID (AC + HS) PRN    magnesium oxide, 800 mg, Oral, PRN    magnesium oxide, 800 mg, Oral, PRN    melatonin, 9 mg, Oral, Nightly PRN    naloxone, 0.02 mg, Intravenous, PRN    ondansetron, 4 mg, Intravenous, Q8H PRN    oxyCODONE-acetaminophen, 1 tablet, Oral, Q4H PRN    potassium bicarbonate, 35 mEq, Oral, PRN    potassium bicarbonate, 50 mEq, Oral, PRN    potassium bicarbonate, 60 mEq, Oral, PRN    potassium, sodium phosphates, 2 packet, Oral, PRN    potassium, sodium phosphates, 2 packet, Oral, PRN    potassium, sodium phosphates, 2 packet, Oral, PRN    promethazine (PHENERGAN) 12.5 mg in 0.9% NaCl 50 mL IVPB, 12.5 mg, Intravenous, Q6H PRN    simethicone, 1 tablet, Oral, QID PRN    sodium chloride 0.9%, 10 mL, Intravenous, Q8H PRN    Physical Findings/Assessment         Estimated/Assessed Needs    Weight Used For Calorie Calculations: 78.4 kg (172 lb 13.5 oz)  Energy Calorie Requirements (kcal): 25kcals/kg (1960kcals/day)  Energy Need Method: Kcal/kg  Protein  Requirements: 1.2-1.5g/kg (94-117g/day)  Weight Used For Protein Calculations: 78.4 kg (172 lb 13.5 oz)  Fluid Requirements (mL): 1ml/kcal  Estimated Fluid Requirement Method: RDA Method  RDA Method (mL): 25  CHO Requirement: 250      Nutrition Prescription Ordered    Current Diet Order: diabetic  Oral Nutrition Supplement: Ruiz BID    Evaluation of Received Nutrient/Fluid Intake    % Kcal Needs: 50-75%  % Protein Needs: 50-75%  I/O: 12/24/24: +840mL since admit  Energy Calories Required: meeting needs  Protein Required: meeting needs  Fluid Required: meeting needs  Comments: LBM: 12/27/2024  Tolerance: tolerating  % Intake of Estimated Energy Needs: Other: NPO  % Meal Intake: NPO    Nutrition Risk    Level of Risk/Frequency of Follow-up: moderate (Follow up: 1-2x per week)       Monitor and Evaluation    Food and Nutrient Intake: energy intake, food and beverage intake  Food and Nutrient Adminstration: diet order  Knowledge/Beliefs/Attitudes: beliefs and attitudes  Physical Activity and Function: factors affecting access to physical activity  Anthropometric Measurements: body mass index, weight change, weight, height/length  Biochemical Data, Medical Tests and Procedures: lipid profile, inflammatory profile, glucose/endocrine profile, gastrointestinal profile, electrolyte and renal panel  Nutrition-Focused Physical Findings: extremities, muscles and bones       Nutrition Follow-Up    RD Follow-up?: Yes  Bebe Valenzuela, MS, RDN, LDN

## 2024-12-27 NOTE — PLAN OF CARE
Problem: Adult Inpatient Plan of Care  Goal: Plan of Care Review  Outcome: Progressing  Goal: Patient-Specific Goal (Individualized)  Outcome: Progressing  Goal: Absence of Hospital-Acquired Illness or Injury  Outcome: Progressing  Goal: Optimal Comfort and Wellbeing  Outcome: Progressing  Goal: Readiness for Transition of Care  Outcome: Progressing     Problem: Diabetes Comorbidity  Goal: Blood Glucose Level Within Targeted Range  Outcome: Progressing     Problem: Skin Injury Risk Increased  Goal: Skin Health and Integrity  Outcome: Progressing     Problem: Infection  Goal: Absence of Infection Signs and Symptoms  Outcome: Progressing     Problem: Wound  Goal: Optimal Coping  Outcome: Progressing  Goal: Optimal Functional Ability  Outcome: Progressing  Goal: Absence of Infection Signs and Symptoms  Outcome: Progressing  Goal: Improved Oral Intake  Outcome: Progressing  Goal: Optimal Pain Control and Function  Outcome: Progressing  Goal: Skin Health and Integrity  Outcome: Progressing  Goal: Optimal Wound Healing  Outcome: Progressing

## 2024-12-28 LAB
ANION GAP SERPL CALC-SCNC: 7 MMOL/L (ref 8–16)
BUN SERPL-MCNC: 10 MG/DL (ref 8–23)
CALCIUM SERPL-MCNC: 8.7 MG/DL (ref 8.7–10.5)
CHLORIDE SERPL-SCNC: 99 MMOL/L (ref 95–110)
CO2 SERPL-SCNC: 30 MMOL/L (ref 23–29)
CREAT SERPL-MCNC: 0.6 MG/DL (ref 0.5–1.4)
ERYTHROCYTE [DISTWIDTH] IN BLOOD BY AUTOMATED COUNT: 14.1 % (ref 11.5–14.5)
EST. GFR  (NO RACE VARIABLE): >60 ML/MIN/1.73 M^2
GLUCOSE SERPL-MCNC: 130 MG/DL (ref 70–110)
HCT VFR BLD AUTO: 25.9 % (ref 37–48.5)
HGB BLD-MCNC: 8.4 G/DL (ref 12–16)
MCH RBC QN AUTO: 30.3 PG (ref 27–31)
MCHC RBC AUTO-ENTMCNC: 32.4 G/DL (ref 32–36)
MCV RBC AUTO: 94 FL (ref 82–98)
PLATELET # BLD AUTO: 179 K/UL (ref 150–450)
PMV BLD AUTO: 10.8 FL (ref 9.2–12.9)
POCT GLUCOSE: 141 MG/DL (ref 70–110)
POCT GLUCOSE: 145 MG/DL (ref 70–110)
POCT GLUCOSE: 149 MG/DL (ref 70–110)
POCT GLUCOSE: 159 MG/DL (ref 70–110)
POCT GLUCOSE: 171 MG/DL (ref 70–110)
POTASSIUM SERPL-SCNC: 3.5 MMOL/L (ref 3.5–5.1)
RBC # BLD AUTO: 2.77 M/UL (ref 4–5.4)
SODIUM SERPL-SCNC: 136 MMOL/L (ref 136–145)
WBC # BLD AUTO: 6.32 K/UL (ref 3.9–12.7)

## 2024-12-28 PROCEDURE — 85027 COMPLETE CBC AUTOMATED: CPT | Performed by: ORTHOPAEDIC SURGERY

## 2024-12-28 PROCEDURE — 25000003 PHARM REV CODE 250: Performed by: ORTHOPAEDIC SURGERY

## 2024-12-28 PROCEDURE — 97116 GAIT TRAINING THERAPY: CPT | Mod: CQ

## 2024-12-28 PROCEDURE — 63600175 PHARM REV CODE 636 W HCPCS: Performed by: NURSE PRACTITIONER

## 2024-12-28 PROCEDURE — 80048 BASIC METABOLIC PNL TOTAL CA: CPT | Performed by: ORTHOPAEDIC SURGERY

## 2024-12-28 PROCEDURE — 94761 N-INVAS EAR/PLS OXIMETRY MLT: CPT

## 2024-12-28 PROCEDURE — 97110 THERAPEUTIC EXERCISES: CPT

## 2024-12-28 PROCEDURE — 27000221 HC OXYGEN, UP TO 24 HOURS

## 2024-12-28 PROCEDURE — 63600175 PHARM REV CODE 636 W HCPCS: Performed by: STUDENT IN AN ORGANIZED HEALTH CARE EDUCATION/TRAINING PROGRAM

## 2024-12-28 PROCEDURE — 97530 THERAPEUTIC ACTIVITIES: CPT | Mod: CQ

## 2024-12-28 PROCEDURE — 25000003 PHARM REV CODE 250: Performed by: STUDENT IN AN ORGANIZED HEALTH CARE EDUCATION/TRAINING PROGRAM

## 2024-12-28 PROCEDURE — 94799 UNLISTED PULMONARY SVC/PX: CPT

## 2024-12-28 PROCEDURE — 99900035 HC TECH TIME PER 15 MIN (STAT)

## 2024-12-28 PROCEDURE — 36415 COLL VENOUS BLD VENIPUNCTURE: CPT | Performed by: ORTHOPAEDIC SURGERY

## 2024-12-28 PROCEDURE — 11000001 HC ACUTE MED/SURG PRIVATE ROOM

## 2024-12-28 PROCEDURE — 25000003 PHARM REV CODE 250: Performed by: NURSE PRACTITIONER

## 2024-12-28 RX ADMIN — OXYCODONE HYDROCHLORIDE AND ACETAMINOPHEN 1 TABLET: 5; 325 TABLET ORAL at 02:12

## 2024-12-28 RX ADMIN — OXYCODONE HYDROCHLORIDE AND ACETAMINOPHEN 1 TABLET: 5; 325 TABLET ORAL at 09:12

## 2024-12-28 RX ADMIN — ONDANSETRON 4 MG: 2 INJECTION INTRAMUSCULAR; INTRAVENOUS at 07:12

## 2024-12-28 RX ADMIN — OXYCODONE HYDROCHLORIDE AND ACETAMINOPHEN 1 TABLET: 5; 325 TABLET ORAL at 01:12

## 2024-12-28 RX ADMIN — HYDROMORPHONE HYDROCHLORIDE 0.5 MG: 1 INJECTION, SOLUTION INTRAMUSCULAR; INTRAVENOUS; SUBCUTANEOUS at 08:12

## 2024-12-28 RX ADMIN — OXYCODONE HYDROCHLORIDE AND ACETAMINOPHEN 1 TABLET: 5; 325 TABLET ORAL at 05:12

## 2024-12-28 RX ADMIN — INSULIN ASPART 1 UNITS: 100 INJECTION, SOLUTION INTRAVENOUS; SUBCUTANEOUS at 09:12

## 2024-12-28 RX ADMIN — MUPIROCIN 1 G: 20 OINTMENT TOPICAL at 09:12

## 2024-12-28 RX ADMIN — PANTOPRAZOLE SODIUM 40 MG: 40 TABLET, DELAYED RELEASE ORAL at 09:12

## 2024-12-28 RX ADMIN — HYDROMORPHONE HYDROCHLORIDE 0.5 MG: 1 INJECTION, SOLUTION INTRAMUSCULAR; INTRAVENOUS; SUBCUTANEOUS at 06:12

## 2024-12-28 RX ADMIN — ENOXAPARIN SODIUM 40 MG: 40 INJECTION SUBCUTANEOUS at 04:12

## 2024-12-28 RX ADMIN — INSULIN ASPART 2 UNITS: 100 INJECTION, SOLUTION INTRAVENOUS; SUBCUTANEOUS at 11:12

## 2024-12-28 RX ADMIN — POTASSIUM BICARBONATE 50 MEQ: 977.5 TABLET, EFFERVESCENT ORAL at 09:12

## 2024-12-28 NOTE — CARE UPDATE
12/28/24 0733   Patient Assessment/Suction   Level of Consciousness (AVPU) alert   Respiratory Effort Unlabored   Expansion/Accessory Muscles/Retractions no use of accessory muscles;no retractions;expansion symmetric   Rhythm/Pattern, Respiratory unlabored;no shortness of breath reported   PRE-TX-O2   Device (Oxygen Therapy) nasal cannula   $ Is the patient on Low Flow Oxygen? Yes   Flow (L/min) (Oxygen Therapy) 2   SpO2 98 %   Pulse Oximetry Type Intermittent   $ Pulse Oximetry - Multiple Charge Pulse Oximetry - Multiple   Pulse 74   Resp 18   Aerosol Therapy   $ Aerosol Therapy Charges PRN treatment not required   Respiratory Treatment Status (SVN) PRN treatment not required   Incentive Spirometer   $ Incentive Spirometer Charges unable to perform  (pt nauseous)

## 2024-12-28 NOTE — PROGRESS NOTES
[facility]/Surg  Orthopedics  Progress Note    Patient Name: Doris Hoover  MRN: [id number]  Admission Date: 12/23/2024  Hospital Length of Stay: 5 days  Attending Provider: Beatris Lund MD  Primary Care Provider: Brandyn Alvraes IV, MD  Follow-up For: Procedure(s) (LRB):  INSERTION, INTRAMEDULLARY MIKEL, FEMUR, TROCHANTER (Right)    Post-Operative Day: 4 Days Post-Op  Subjective:     Principal Problem:Closed displaced intertrochanteric fracture of right femur    Principal Orthopedic Problem:  displaced right hip subtrochanteric fracture.        Interval History:  status post open reduction internal fixation.    Review of patient's allergies indicates:   Allergen Reactions    Butorphanol tartrate Other (See Comments)     Hallucinations  hallucinations      Methylprednisolone Other (See Comments) and Shortness Of Breath     flushing  flushing         Current Facility-Administered Medications   Medication    0.9%  NaCl infusion (for blood administration)    acetaminophen tablet 650 mg    albuterol-ipratropium 2.5 mg-0.5 mg/3 mL nebulizer solution 3 mL    aluminum-magnesium hydroxide-simethicone 200-200-20 mg/5 mL suspension 30 mL    dextrose 10% bolus 125 mL 125 mL    dextrose 10% bolus 250 mL 250 mL    enoxaparin injection 40 mg    glucagon (human recombinant) injection 1 mg    glucose chewable tablet 16 g    glucose chewable tablet 24 g    HYDROmorphone injection 0.5 mg    insulin aspart U-100 pen 1-10 Units    magnesium oxide tablet 800 mg    magnesium oxide tablet 800 mg    melatonin tablet 9 mg    mupirocin 2 % ointment    naloxone 0.4 mg/mL injection 0.02 mg    ondansetron injection 4 mg    oxyCODONE-acetaminophen 5-325 mg per tablet 1 tablet    pantoprazole EC tablet 40 mg    potassium bicarbonate disintegrating tablet 35 mEq    potassium bicarbonate disintegrating tablet 50 mEq    potassium bicarbonate disintegrating tablet 60 mEq    potassium, sodium phosphates 280-160-250 mg packet 2  "packet    potassium, sodium phosphates 280-160-250 mg packet 2 packet    potassium, sodium phosphates 280-160-250 mg packet 2 packet    promethazine (PHENERGAN) 12.5 mg in 0.9% NaCl 50 mL IVPB    simethicone chewable tablet 80 mg    sodium chloride 0.9% flush 10 mL     Objective:     Vital Signs (Most Recent):  Temp: 98 °F (36.7 °C) (12/28/24 0845)  Pulse: 75 (12/28/24 0845)  Resp: 18 (12/28/24 0909)  BP: 124/66 (12/28/24 0845)  SpO2: 98 % (12/28/24 0845) Vital Signs (24h Range):  Temp:  [97.9 °F (36.6 °C)-99.1 °F (37.3 °C)] 98 °F (36.7 °C)  Pulse:  [] 75  Resp:  [16-20] 18  SpO2:  [93 %-98 %] 98 %  BP: (124-146)/(60-74) 124/66     Weight: 82.4 kg (181 lb 10.5 oz)  Height: 5' 6" (167.6 cm)  Body mass index is 29.32 kg/m².      Intake/Output Summary (Last 24 hours) at 12/28/2024 1133  Last data filed at 12/28/2024 0400  Gross per 24 hour   Intake 660 ml   Output 1500 ml   Net -840 ml        General    Constitutional: She appears well-developed and well-nourished.             Right Hip Exam     Comments:    Ambulating 32 ft with PT.         Significant Labs: None    Significant Imaging: None  Assessment/Plan:     * Closed displaced intertrochanteric fracture of right femur  1. Status post right hip open reduction internal fixation.        Continue with daily dressing changes to right hip incisions.  She can weightbear as tolerated on her right lower extremity.  Continue with physical therapy/rehabilitation efforts.  Discharge plan per attending.  DVT prophylaxis per attending.  She can follow up in the outpatient clinic with Dr. Olson at 2 weeks postop for wound check and suture removal.  Patient is stable from an orthopedic perspective.          Jesus Harman PA-C  Orthopedics  [facility] - Med/Surg    "

## 2024-12-28 NOTE — SUBJECTIVE & OBJECTIVE
Principal Problem:Closed displaced intertrochanteric fracture of right femur    Principal Orthopedic Problem:  displaced right hip subtrochanteric fracture.        Interval History:  status post open reduction internal fixation.    Review of patient's allergies indicates:   Allergen Reactions    Butorphanol tartrate Other (See Comments)     Hallucinations  hallucinations      Methylprednisolone Other (See Comments) and Shortness Of Breath     flushing  flushing         Current Facility-Administered Medications   Medication    0.9%  NaCl infusion (for blood administration)    acetaminophen tablet 650 mg    albuterol-ipratropium 2.5 mg-0.5 mg/3 mL nebulizer solution 3 mL    aluminum-magnesium hydroxide-simethicone 200-200-20 mg/5 mL suspension 30 mL    dextrose 10% bolus 125 mL 125 mL    dextrose 10% bolus 250 mL 250 mL    enoxaparin injection 40 mg    glucagon (human recombinant) injection 1 mg    glucose chewable tablet 16 g    glucose chewable tablet 24 g    HYDROmorphone injection 0.5 mg    insulin aspart U-100 pen 1-10 Units    magnesium oxide tablet 800 mg    magnesium oxide tablet 800 mg    melatonin tablet 9 mg    mupirocin 2 % ointment    naloxone 0.4 mg/mL injection 0.02 mg    ondansetron injection 4 mg    oxyCODONE-acetaminophen 5-325 mg per tablet 1 tablet    pantoprazole EC tablet 40 mg    potassium bicarbonate disintegrating tablet 35 mEq    potassium bicarbonate disintegrating tablet 50 mEq    potassium bicarbonate disintegrating tablet 60 mEq    potassium, sodium phosphates 280-160-250 mg packet 2 packet    potassium, sodium phosphates 280-160-250 mg packet 2 packet    potassium, sodium phosphates 280-160-250 mg packet 2 packet    promethazine (PHENERGAN) 12.5 mg in 0.9% NaCl 50 mL IVPB    simethicone chewable tablet 80 mg    sodium chloride 0.9% flush 10 mL     Objective:     Vital Signs (Most Recent):  Temp: 98 °F (36.7 °C) (12/28/24 0845)  Pulse: 75 (12/28/24 0845)  Resp: 18 (12/28/24 0909)  BP:  "124/66 (12/28/24 0845)  SpO2: 98 % (12/28/24 0845) Vital Signs (24h Range):  Temp:  [97.9 °F (36.6 °C)-99.1 °F (37.3 °C)] 98 °F (36.7 °C)  Pulse:  [] 75  Resp:  [16-20] 18  SpO2:  [93 %-98 %] 98 %  BP: (124-146)/(60-74) 124/66     Weight: 82.4 kg (181 lb 10.5 oz)  Height: 5' 6" (167.6 cm)  Body mass index is 29.32 kg/m².      Intake/Output Summary (Last 24 hours) at 12/28/2024 1133  Last data filed at 12/28/2024 0400  Gross per 24 hour   Intake 660 ml   Output 1500 ml   Net -840 ml        General    Constitutional: She appears well-developed and well-nourished.             Right Hip Exam     Comments:    Ambulating 32 ft with PT.         Significant Labs: None    Significant Imaging: None  "

## 2024-12-28 NOTE — PROGRESS NOTES
[facility] Medicine  Progress Note    Patient Name: Doris Hoover  MRN: [id number]  Patient Class: IP- Inpatient   Admission Date: 12/23/2024  Length of Stay: 5 days  Attending Physician: Beatris Lund MD  Primary Care Provider: Brandyn Alvares IV, MD        Subjective     Principal Problem:Closed displaced intertrochanteric fracture of right femur        HPI:  Doris Hoover is a 64 year female who presented in transfer from [facility] in [place].  She was transferred for orthopedic services.  She was transferred for a displaced right intertrochanteric fracture.  She sustained mechanical fall at residence.  She does not not take anticoagulants.  No head injury or loss of consciousness.  No recent fever chills.  No known sick contacts or travel.  She describes the pain as intense.  She rates the pain at 10/10.  Pain is worse with any movement of the right leg.  Minimal relief with remaining still.  Previous medical history includes asthma, hypertension, interstitial cystitis, diabetes.  Outside facility labs reviewed.  CBC unremarkable.  Glucose 181.  Admitted to Hospital Medicine.  Plans for OR repair in a.m. with Dr. Olson.    Overview/Hospital Course:  64-year-old female with asthma, hypertension, diabetes is admitted after a fall with right displaced intertrochanteric femur fracture.  Orthopedic consulted.  Underwent intramedullary nailing 12/24.  PT/OT consulted. Lovenox for DVT prevention.  1 unit packed red blood cell ordered 12/27. PT/OT recommended high intensity therapy, case management worked on arrangements.    Interval History:  Seen on a.m. rounds.  Pain is  better controlled. Worked with PT just prior to rounds.      Objective:     Vital Signs (Most Recent):  Temp: 98 °F (36.7 °C) (12/28/24 0845)  Pulse: 75 (12/28/24 0845)  Resp: 18 (12/28/24 0909)  BP: 124/66 (12/28/24 0845)  SpO2: 98 % (12/28/24 0845) Vital Signs (24h Range):  Temp:  [97.9 °F (36.6  °C)-99.1 °F (37.3 °C)] 98 °F (36.7 °C)  Pulse:  [] 75  Resp:  [16-20] 18  SpO2:  [93 %-98 %] 98 %  BP: (124-146)/(60-74) 124/66     Weight: 82.4 kg (181 lb 10.5 oz)  Body mass index is 29.32 kg/m².    Intake/Output Summary (Last 24 hours) at 12/28/2024 1151  Last data filed at 12/28/2024 0400  Gross per 24 hour   Intake 660 ml   Output 1500 ml   Net -840 ml         Physical Exam  Vitals reviewed.   Constitutional:       General: She is not in acute distress.  HENT:      Head: Normocephalic and atraumatic.   Cardiovascular:      Rate and Rhythm: Normal rate and regular rhythm.   Pulmonary:      Effort: Pulmonary effort is normal. No respiratory distress.   Musculoskeletal:      Comments: Dressings to the right lateral thigh C/D/I  RLE NVI   Neurological:      General: No focal deficit present.      Mental Status: She is alert and oriented to person, place, and time. Mental status is at baseline.   Psychiatric:         Mood and Affect: Affect normal.         Behavior: Behavior normal.         Thought Content: Thought content normal.             Significant Labs: All pertinent labs within the past 24 hours have been reviewed.    Significant Imaging: I have reviewed all pertinent imaging results/findings within the past 24 hours.  Review of Systems    Assessment and Plan     * Closed displaced intertrochanteric fracture of right femur  Now status post intramedullary nail 12/24  -p.r.n. pain med  -follow up postop H and H  -IS  -Lovenox for DVT prophylaxis  -PT/OT,  recommend high-intensity therapy  -follow up ortho outpatient in 2 weeks postop    Thrombocytopenia  The likely etiology of thrombocytopenia is platelet consumption from fracture and surgery . The patients 3 most recent labs are listed below.  Recent Labs     12/25/24  0418 12/26/24  0434 12/27/24  0528   * 126* 141*     Plan  - Will transfuse if platelet count is <10k.  - monitor on CBC daily    Acute blood loss anemia  Anemia is likely due to  "acute blood loss which was from fracture and surgery . Most recent hemoglobin and hematocrit are listed below.  Recent Labs     12/26/24  0434 12/27/24  0528 12/28/24  0403   HGB 7.8* 7.1* 8.4*   HCT 25.2* 22.5* 25.9*       Plan  - Monitor serial CBC: Daily  - Transfuse PRBC if patient becomes hemodynamically unstable, symptomatic or hemoglobin drops below 8  - Patient has not received any PRBC transfusions to date  - Patient's anemia is currently  worse  - transfused 1 unit packed red blood cells on 12/27      Postoperative fever  Attributed to atelectasis  -aggressive IS    Hypoxia  From atelectasis.  -wean O2 as able  -aggressive incentive spirometry    Type 2 diabetes mellitus, without long-term current use of insulin  Patient's FSGs are controlled on current medication regimen.  Last A1c reviewed- No results found for: "LABA1C", "HGBA1C"  Most recent fingerstick glucose reviewed-   Recent Labs   Lab 12/26/24  1144 12/26/24  1605 12/26/24  1924 12/27/24  0749   POCTGLUCOSE 164* 148* 231* 147*       Current correctional scale  Medium  Maintain anti-hyperglycemic dose as follows-   Antihyperglycemics (From admission, onward)      Start     Stop Route Frequency Ordered    12/23/24 1802  insulin aspart U-100 pen 1-10 Units         -- SubQ Before meals & nightly PRN 12/23/24 1703          Hold Oral hypoglycemics while patient is in the hospital.     Essential hypertension  Holding stable  -continue to hold her amlodipine and atenolol for now and reassess      VTE Risk Mitigation (From admission, onward)           Ordered     enoxaparin injection 40 mg  Every 24 hours         12/27/24 0843     IP VTE HIGH RISK PATIENT  Once         12/23/24 1703     Place sequential compression device  Until discontinued         12/23/24 1703     Place JIMMY hose  Until discontinued         12/23/24 1703                    Discharge Planning   PETER: 12/30/2024     Code Status: Full Code   Medical Readiness for Discharge Date:   Discharge " Plan A: Home Health                Please place Justification for DME        Beatris Lund MD, MD  Department of Hospital Medicine   [facility]/Surg

## 2024-12-28 NOTE — ASSESSMENT & PLAN NOTE
1. Status post right hip open reduction internal fixation.        Continue with daily dressing changes to right hip incisions.  She can weightbear as tolerated on her right lower extremity.  Continue with physical therapy/rehabilitation efforts.  Discharge plan per attending.  DVT prophylaxis per attending.  She can follow up in the outpatient clinic with Dr. Olson at 2 weeks postop for wound check and suture removal.  Patient is stable from an orthopedic perspective.

## 2024-12-28 NOTE — ASSESSMENT & PLAN NOTE
Now status post intramedullary nail 12/24  -p.r.n. pain med  -follow up postop H and H  -IS  -Lovenox for DVT prophylaxis  -PT/OT,  recommend high-intensity therapy  -follow up ortho outpatient in 2 weeks postop

## 2024-12-28 NOTE — PROGRESS NOTES
Received call from Savanna with [facility]- they have auth and can admit tomorrow if medically clear.   Spoke to pt to see if she is agreeable to go- she said yes but is still getting IV pain meds and likely will not be ready tomorrow.   Spoke to Savanna again and she said she cannot guarantee a bed for Monday.      1400 patient called me back- said that she would prefer to go to [facility] but understands they do not have auth. Will f/u Monday 12/28/24 1350   Discharge Reassessment   Assessment Type Discharge Planning Reassessment   Did the patient's condition or plan change since previous assessment? Yes   Discharge Plan discussed with: Patient   Communicated PETER with patient/caregiver Yes   Discharge Plan A Rehab   Discharge Plan B Rehab   DME Needed Upon Discharge  none   Why the patient remains in the hospital Requires continued medical care   Post-Acute Status   Post-Acute Authorization Placement   Post-Acute Placement Status Pending medical clearance/testing

## 2024-12-28 NOTE — SUBJECTIVE & OBJECTIVE
Interval History:  Seen on a.m. rounds.  Pain is  better controlled. Worked with PT just prior to rounds.      Objective:     Vital Signs (Most Recent):  Temp: 98 °F (36.7 °C) (12/28/24 0845)  Pulse: 75 (12/28/24 0845)  Resp: 18 (12/28/24 0909)  BP: 124/66 (12/28/24 0845)  SpO2: 98 % (12/28/24 0845) Vital Signs (24h Range):  Temp:  [97.9 °F (36.6 °C)-99.1 °F (37.3 °C)] 98 °F (36.7 °C)  Pulse:  [] 75  Resp:  [16-20] 18  SpO2:  [93 %-98 %] 98 %  BP: (124-146)/(60-74) 124/66     Weight: 82.4 kg (181 lb 10.5 oz)  Body mass index is 29.32 kg/m².    Intake/Output Summary (Last 24 hours) at 12/28/2024 1151  Last data filed at 12/28/2024 0400  Gross per 24 hour   Intake 660 ml   Output 1500 ml   Net -840 ml         Physical Exam  Vitals reviewed.   Constitutional:       General: She is not in acute distress.  HENT:      Head: Normocephalic and atraumatic.   Cardiovascular:      Rate and Rhythm: Normal rate and regular rhythm.   Pulmonary:      Effort: Pulmonary effort is normal. No respiratory distress.   Musculoskeletal:      Comments: Dressings to the right lateral thigh C/D/I  RLE NVI   Neurological:      General: No focal deficit present.      Mental Status: She is alert and oriented to person, place, and time. Mental status is at baseline.   Psychiatric:         Mood and Affect: Affect normal.         Behavior: Behavior normal.         Thought Content: Thought content normal.             Significant Labs: All pertinent labs within the past 24 hours have been reviewed.    Significant Imaging: I have reviewed all pertinent imaging results/findings within the past 24 hours.  Review of Systems

## 2024-12-28 NOTE — PT/OT/SLP PROGRESS
Occupational Therapy   Treatment    Name: Doris Hoover  MRN: [id number]  Admitting Diagnosis:  Closed displaced intertrochanteric fracture of right femur  4 Days Post-Op    Recommendations:     Discharge Recommendations: High Intensity Therapy  Discharge Equipment Recommendations:  other (see comments) (TBD)  Barriers to discharge:       Assessment:     Doris Hoover is a 64 y.o. female with a medical diagnosis of Closed displaced intertrochanteric fracture of right femur.  She presents with the following performance deficits affecting function are weakness, impaired endurance, impaired self care skills, impaired functional mobility, gait instability, impaired balance, decreased lower extremity function, decreased upper extremity function, pain, decreased ROM, orthopedic precautions. Pt reports feeling better since blood transfusion. Pt just back to bed after ambulation to bathroom with nurse. Pt agreeable to BUE therapeutic exercises. OT provided instruction in graded BUE therapeutic exercises to increase strength and functional activity tolerance for ADL's/IADL's. Pt require cues and demonstration to complete. Pts lunch arrived. OT provided education in calling for assist. Pt verbalized understanding.     Rehab Prognosis:  Good; patient would benefit from acute skilled OT services to address these deficits and reach maximum level of function.       Plan:     Patient to be seen 6 x/week to address the above listed problems via self-care/home management, therapeutic exercises, therapeutic activities  Plan of Care Expires: 01/23/25  Plan of Care Reviewed with: patient    Subjective     Chief Complaint: pain  Patient/Family Comments/goals: To get better  Pain/Comfort:  Pain Rating 1:  (not rated)    Objective:     Communicated with: Nurse Fine prior to session.  Patient found HOB elevated with bed alarm, peripheral IV upon OT entry to room.    General Precautions: Standard, fall    Orthopedic Precautions:RLE  weight bearing as tolerated  Braces: N/A  Respiratory Status: Room air     Occupational Performance:         [facility] 6 Click ADL:      Treatment & Education:  OT provided instruction in graded BUE therapeutic exercises to increase strength and functional activity tolerance for ADL's/IADL's. Pt required cues and demonstration to complete.  OT provided education in calling for assist. Patient verbalized understanding.      Patient left HOB elevated with all lines intact, call button in reach, bed alarm on, and daughter present    GOALS:   Multidisciplinary Problems       Occupational Therapy Goals          Problem: Occupational Therapy    Goal Priority Disciplines Outcome Interventions   Occupational Therapy Goal     OT, PT/OT Progressing    Description: Goals to be met by: 1/23/25     Patient will increase functional independence with ADLs by performing:    UE Dressing with Modified [place].  LE Dressing with Modified [place].  Grooming while standing at sink with Modified [place].  Toileting from toilet with Modified [place] for hygiene and clothing management.   Bathing from  shower chair/bench with Modified [place].  Toilet transfer to toilet with Modified [place].  Increased strength and functional activity tolerance for ADL's/IADL's                         Time Tracking:     OT Date of Treatment: 12/28/24  OT Start Time: 1117  OT Stop Time: 1135  OT Total Time (min): 18 min    Billable Minutes:Therapeutic Exercise 18    OT/KELSEY: OT          12/28/2024

## 2024-12-28 NOTE — ASSESSMENT & PLAN NOTE
Anemia is likely due to acute blood loss which was from fracture and surgery . Most recent hemoglobin and hematocrit are listed below.  Recent Labs     12/26/24  0434 12/27/24  0528 12/28/24  0403   HGB 7.8* 7.1* 8.4*   HCT 25.2* 22.5* 25.9*       Plan  - Monitor serial CBC: Daily  - Transfuse PRBC if patient becomes hemodynamically unstable, symptomatic or hemoglobin drops below 8  - Patient has not received any PRBC transfusions to date  - Patient's anemia is currently  worse  - transfused 1 unit packed red blood cells on 12/27

## 2024-12-28 NOTE — PLAN OF CARE
Problem: Adult Inpatient Plan of Care  Goal: Plan of Care Review  Outcome: Progressing  Goal: Patient-Specific Goal (Individualized)  Outcome: Progressing  Goal: Absence of Hospital-Acquired Illness or Injury  Outcome: Progressing  Goal: Optimal Comfort and Wellbeing  Outcome: Progressing  Goal: Readiness for Transition of Care  Outcome: Progressing     Problem: Diabetes Comorbidity  Goal: Blood Glucose Level Within Targeted Range  Outcome: Progressing     Problem: Skin Injury Risk Increased  Goal: Skin Health and Integrity  Outcome: Progressing     Problem: Infection  Goal: Absence of Infection Signs and Symptoms  Outcome: Progressing     Problem: Wound  Goal: Optimal Coping  Outcome: Progressing  Goal: Optimal Functional Ability  Outcome: Progressing  Goal: Absence of Infection Signs and Symptoms  Outcome: Progressing  Goal: Improved Oral Intake  Outcome: Progressing  Goal: Optimal Pain Control and Function  Outcome: Progressing  Goal: Skin Health and Integrity  Outcome: Progressing  Goal: Optimal Wound Healing  Outcome: Progressing     Problem: Fall Injury Risk  Goal: Absence of Fall and Fall-Related Injury  Outcome: Progressing    POC/Meds reviewed with pt. Pt verbalized complete understanding. Two hour pt rounding maintained throughout shift. All fall precautions maintained. IS at bedside, instructed on proper use. Bedside commode beside bed for pt use w/ assistance. Slip resistant socks maintained throughout shift while out of bed. Pain controlled with prn pain meds throughout shift. Bed in lowest position, locked, and call light in reach. Side rails up x2. No complaints at this time. Will continue to monitor.

## 2024-12-28 NOTE — PLAN OF CARE
Problem: Occupational Therapy  Goal: Occupational Therapy Goal  Description: Goals to be met by: 1/23/25     Patient will increase functional independence with ADLs by performing:    UE Dressing with Modified [place].  LE Dressing with Modified [place].  Grooming while standing at sink with Modified [place].  Toileting from toilet with Modified [place] for hygiene and clothing management.   Bathing from  shower chair/bench with Modified [place].  Toilet transfer to toilet with Modified [place].  Increased strength and functional activity tolerance for ADL's/IADL's    Outcome: Progressing  Continue with POC

## 2024-12-28 NOTE — PT/OT/SLP PROGRESS
Physical Therapy Treatment    Patient Name:  Doris Hoover   MRN:  [id number]    Recommendations:     Discharge Recommendations: High Intensity Therapy  Discharge Equipment Recommendations: none  Barriers to discharge: None    Assessment:     Doris Hoover is a 64 y.o. female admitted with a medical diagnosis of Closed displaced intertrochanteric fracture of right femur.  She presents with the following impairments/functional limitations: weakness, impaired endurance, impaired self care skills, impaired functional mobility, gait instability, impaired balance, decreased lower extremity function, decreased safety awareness, pain, orthopedic precautions . Pt seen with HOB elevated and daughter at bed side. Pt was able to mobilize to eob requiring mod a for LE management and VI for technique. Sit to stand t/f completed min a with VI for safety with hand placement followed by lateral weight shift to encourage WB on painful LE. Pt completed stand pivot t/f to bed side chair requiring min a and VI for sequencing. Demonstration of sequencing step-to and step through gait patterns prior to gait trial with verbal understanding from pt. Pt ambulated 32 feet CGA/Min a with VI for lateral weight shift to advance R LE.     Rehab Prognosis: Fair; patient would benefit from acute skilled PT services to address these deficits and reach maximum level of function.    Recent Surgery: Procedure(s) (LRB):  INSERTION, INTRAMEDULLARY MIKEL, FEMUR, TROCHANTER (Right) 4 Days Post-Op    Plan:     During this hospitalization, patient to be seen daily to address the identified rehab impairments via gait training, therapeutic activities, therapeutic exercises and progress toward the following goals:    Plan of Care Expires:  01/15/25    Subjective     Chief Complaint: R LE pain  Patient/Family Comments/goals: Pt agreeable to PT.   Pain/Comfort:  Pain Rating 1: 7/10  Location - Side 1: Right  Location - Orientation 1: generalized  Location 1:  hip  Pain Rating Post-Intervention 1: 7/10      Objective:     Communicated with RN prior to session.  Patient found HOB elevated with bed alarm, peripheral IV upon PT entry to room.     General Precautions: Standard, fall  Orthopedic Precautions: RLE weight bearing as tolerated  Braces: N/A  Respiratory Status: Room air     Functional Mobility:  Bed Mobility:     Supine to Sit: moderate assistance  Transfers:     Sit to Stand:  minimum assistance with rolling walker  Gait: 32' CGA/Min a with rw      AM-PAC 6 CLICK MOBILITY          Treatment & Education:  Pt was educated on the following: call light use, importance of OOB activity and functional mobility to negate the negative effects of prolonged bed rest during this hospitalization, safe transfers/ambulation and discharge planning recommendations/options.     Patient left up in chair with all lines intact, call button in reach, chair alarm on, and daughter present..    GOALS:   Multidisciplinary Problems       Physical Therapy Goals          Problem: Physical Therapy    Goal Priority Disciplines Outcome Interventions   Physical Therapy Goal     PT, PT/OT Progressing    Description: Goals to be met by: 01-     Patient will increase functional independence with mobility by performin. Supine to sit with MInimal Assistance  2. Sit to stand transfer with Minimal Assistance  3. Bed to chair transfer with Minimal Assistance using Rolling Walker  4. Gait  x 50 feet with Minimal Assistance using Rolling Walker.   5. Lower extremity exercise program x20 reps    RLE WBAT post IM nailing 2024                         Time Tracking:     PT Received On: 24  PT Start Time: 837     PT Stop Time: 910  PT Total Time (min): 33 min     Billable Minutes: Gait Training 22 and Therapeutic Activity 11    Treatment Type: Treatment  PT/PTA: PTA     Number of PTA visits since last PT visit: 2024

## 2024-12-29 LAB
ANION GAP SERPL CALC-SCNC: 11 MMOL/L (ref 8–16)
BUN SERPL-MCNC: 11 MG/DL (ref 8–23)
CALCIUM SERPL-MCNC: 8.7 MG/DL (ref 8.7–10.5)
CHLORIDE SERPL-SCNC: 99 MMOL/L (ref 95–110)
CO2 SERPL-SCNC: 28 MMOL/L (ref 23–29)
CREAT SERPL-MCNC: 0.7 MG/DL (ref 0.5–1.4)
ERYTHROCYTE [DISTWIDTH] IN BLOOD BY AUTOMATED COUNT: 13.9 % (ref 11.5–14.5)
EST. GFR  (NO RACE VARIABLE): >60 ML/MIN/1.73 M^2
GLUCOSE SERPL-MCNC: 137 MG/DL (ref 70–110)
HCT VFR BLD AUTO: 27.5 % (ref 37–48.5)
HGB BLD-MCNC: 8.7 G/DL (ref 12–16)
MCH RBC QN AUTO: 29.7 PG (ref 27–31)
MCHC RBC AUTO-ENTMCNC: 31.6 G/DL (ref 32–36)
MCV RBC AUTO: 94 FL (ref 82–98)
PLATELET # BLD AUTO: 221 K/UL (ref 150–450)
PMV BLD AUTO: 10.8 FL (ref 9.2–12.9)
POCT GLUCOSE: 146 MG/DL (ref 70–110)
POCT GLUCOSE: 150 MG/DL (ref 70–110)
POCT GLUCOSE: 180 MG/DL (ref 70–110)
POTASSIUM SERPL-SCNC: 3.6 MMOL/L (ref 3.5–5.1)
RBC # BLD AUTO: 2.93 M/UL (ref 4–5.4)
SODIUM SERPL-SCNC: 138 MMOL/L (ref 136–145)
WBC # BLD AUTO: 5.7 K/UL (ref 3.9–12.7)

## 2024-12-29 PROCEDURE — 80048 BASIC METABOLIC PNL TOTAL CA: CPT | Performed by: ORTHOPAEDIC SURGERY

## 2024-12-29 PROCEDURE — 25000003 PHARM REV CODE 250: Performed by: NURSE PRACTITIONER

## 2024-12-29 PROCEDURE — 27000221 HC OXYGEN, UP TO 24 HOURS

## 2024-12-29 PROCEDURE — 63600175 PHARM REV CODE 636 W HCPCS: Performed by: STUDENT IN AN ORGANIZED HEALTH CARE EDUCATION/TRAINING PROGRAM

## 2024-12-29 PROCEDURE — 97116 GAIT TRAINING THERAPY: CPT

## 2024-12-29 PROCEDURE — 11000001 HC ACUTE MED/SURG PRIVATE ROOM

## 2024-12-29 PROCEDURE — 63600175 PHARM REV CODE 636 W HCPCS: Performed by: NURSE PRACTITIONER

## 2024-12-29 PROCEDURE — 25000003 PHARM REV CODE 250: Performed by: HOSPITALIST

## 2024-12-29 PROCEDURE — 99900035 HC TECH TIME PER 15 MIN (STAT)

## 2024-12-29 PROCEDURE — 85027 COMPLETE CBC AUTOMATED: CPT | Performed by: ORTHOPAEDIC SURGERY

## 2024-12-29 PROCEDURE — 94761 N-INVAS EAR/PLS OXIMETRY MLT: CPT

## 2024-12-29 PROCEDURE — 25000003 PHARM REV CODE 250: Performed by: STUDENT IN AN ORGANIZED HEALTH CARE EDUCATION/TRAINING PROGRAM

## 2024-12-29 PROCEDURE — 51798 US URINE CAPACITY MEASURE: CPT

## 2024-12-29 PROCEDURE — 36415 COLL VENOUS BLD VENIPUNCTURE: CPT | Performed by: ORTHOPAEDIC SURGERY

## 2024-12-29 PROCEDURE — 63600175 PHARM REV CODE 636 W HCPCS: Performed by: HOSPITALIST

## 2024-12-29 PROCEDURE — 94799 UNLISTED PULMONARY SVC/PX: CPT

## 2024-12-29 RX ADMIN — ENOXAPARIN SODIUM 40 MG: 40 INJECTION SUBCUTANEOUS at 05:12

## 2024-12-29 RX ADMIN — PROMETHAZINE HYDROCHLORIDE 12.5 MG: 25 INJECTION INTRAMUSCULAR; INTRAVENOUS at 03:12

## 2024-12-29 RX ADMIN — INSULIN ASPART 2 UNITS: 100 INJECTION, SOLUTION INTRAVENOUS; SUBCUTANEOUS at 05:12

## 2024-12-29 RX ADMIN — OXYCODONE HYDROCHLORIDE AND ACETAMINOPHEN 1 TABLET: 5; 325 TABLET ORAL at 02:12

## 2024-12-29 RX ADMIN — POTASSIUM BICARBONATE 50 MEQ: 977.5 TABLET, EFFERVESCENT ORAL at 09:12

## 2024-12-29 RX ADMIN — PANTOPRAZOLE SODIUM 40 MG: 40 TABLET, DELAYED RELEASE ORAL at 09:12

## 2024-12-29 RX ADMIN — ONDANSETRON 4 MG: 2 INJECTION INTRAMUSCULAR; INTRAVENOUS at 10:12

## 2024-12-29 RX ADMIN — HYDROMORPHONE HYDROCHLORIDE 0.5 MG: 1 INJECTION, SOLUTION INTRAMUSCULAR; INTRAVENOUS; SUBCUTANEOUS at 11:12

## 2024-12-29 RX ADMIN — INSULIN ASPART 1 UNITS: 100 INJECTION, SOLUTION INTRAVENOUS; SUBCUTANEOUS at 08:12

## 2024-12-29 RX ADMIN — OXYCODONE HYDROCHLORIDE AND ACETAMINOPHEN 1 TABLET: 5; 325 TABLET ORAL at 03:12

## 2024-12-29 NOTE — PLAN OF CARE
Spoke with edenilson at [facility]- advised that [facility] is the patients preference and requested they retract their authorization.   [facility] has accepted the patient and will contact the insurance on Monday.

## 2024-12-29 NOTE — CARE UPDATE
12/29/24 0809   Patient Assessment/Suction   Level of Consciousness (AVPU) alert   Respiratory Effort Unlabored   Expansion/Accessory Muscles/Retractions no use of accessory muscles;no retractions;expansion symmetric   Rhythm/Pattern, Respiratory unlabored;depth regular;pattern regular;no shortness of breath reported   PRE-TX-O2   Device (Oxygen Therapy) nasal cannula with humidification   $ Is the patient on Low Flow Oxygen? Yes   Flow (L/min) (Oxygen Therapy) 2   SpO2 (!) 93 %   Pulse Oximetry Type Intermittent   $ Pulse Oximetry - Multiple Charge Pulse Oximetry - Multiple   Pulse 90   Resp 18   Aerosol Therapy   $ Aerosol Therapy Charges PRN treatment not required   Respiratory Treatment Status (SVN) PRN treatment not required   Incentive Spirometer   $ Incentive Spirometer Charges done with encouragement   Incentive Spirometer Predicted Level (mL) 1800   Administration (IS) mouthpiece utilized   Number of Repetitions (IS) 5   Level Incentive Spirometer (mL) 2500   Patient Tolerance (IS) good;no adverse signs/symptoms present

## 2024-12-29 NOTE — PLAN OF CARE
POC/Meds reviewed, pt verbalized understanding. Vitals stable.  Afebrile.   Tele In place-[id number].  accuchecks monitored. Prn medication given for pain. Up with x1 assist. Repositions self. Hourly/Q2hr rounding performed, safety maintained. Bed in lowest position, wheels locked, SR up x2, call light in easy reach. No  complaints at this time.

## 2024-12-29 NOTE — PLAN OF CARE
Problem: Physical Therapy  Goal: Physical Therapy Goal  Description: Goals to be met by: 01-     Patient will increase functional independence with mobility by performin. Supine to sit with MInimal Assistance  2. Sit to stand transfer with Minimal Assistance  3. Bed to chair transfer with Minimal Assistance using Rolling Walker  4. Gait  x 50 feet with Minimal Assistance using Rolling Walker.   5. Lower extremity exercise program x20 reps    RLE WBAT post IM nailing 2024    Outcome: Progressing   Pt ambulated 15ft with RW min assist. OOB chair. HIT

## 2024-12-29 NOTE — PT/OT/SLP PROGRESS
Physical Therapy Treatment    Patient Name:  Doris Hoover   MRN:  [id number]    Recommendations:     Discharge Recommendations: High Intensity Therapy  Discharge Equipment Recommendations: none  Barriers to discharge: Decreased caregiver support    Assessment:     Doris Hoover is a 64 y.o. female admitted with a medical diagnosis of Closed displaced intertrochanteric fracture of right femur.  She presents with the following impairments/functional limitations: weakness, impaired endurance, impaired self care skills, impaired functional mobility, gait instability, impaired balance, decreased lower extremity function, decreased safety awareness, pain, orthopedic precautions .    Pt seen seated EOB with daughter- pt requesting bathroom use. Pt stated of being tired today. Pt requiring min assist for mobility and ambulated with RW 15 ft to bathroom and up in chair. Slow beatriz but advancing feet well. Set up assist for hygiene. OOB chair post PT.  HIT    Rehab Prognosis: Good; patient would benefit from acute skilled PT services to address these deficits and reach maximum level of function.    Recent Surgery: Procedure(s) (LRB):  INSERTION, INTRAMEDULLARY MIKEL, FEMUR, TROCHANTER (Right) 5 Days Post-Op    Plan:     During this hospitalization, patient to be seen daily to address the identified rehab impairments via gait training, therapeutic activities, therapeutic exercises and progress toward the following goals:    Plan of Care Expires:  01/15/25    Subjective     Chief Complaint: weakness/tired  Patient/Family Comments/goals: get well  Pain/Comfort:         Objective:     Communicated with nurse Leslie prior to session.  Patient found sitting edge of bed with telemetry, bed alarm upon PT entry to room.     General Precautions: Standard, fall  Orthopedic Precautions: RLE weight bearing as tolerated  Braces: N/A  Respiratory Status: Room air     Functional Mobility:  Transfers:     Sit to Stand:  minimum assistance  with rolling walker  Toilet Transfer: minimum assistance with  rolling walker  using  Stand Pivot  Gait: 15ft with RW min assist with small steps and extra time      AM-PAC 6 CLICK MOBILITY          Treatment & Education:  Patient was educated on the importance of OOB activity and functional mobility to negate negative effects of prolonged bed rest during hospitalization, safe transfers and ambulation, and D/C planning   OOB chair post PT with all needs within reach    Patient left up in chair with all lines intact, call button in reach, chair alarm on, and daughter and nursing for bladder scan present..    GOALS:   Multidisciplinary Problems       Physical Therapy Goals          Problem: Physical Therapy    Goal Priority Disciplines Outcome Interventions   Physical Therapy Goal     PT, PT/OT Progressing    Description: Goals to be met by: 01-     Patient will increase functional independence with mobility by performin. Supine to sit with MInimal Assistance  2. Sit to stand transfer with Minimal Assistance  3. Bed to chair transfer with Minimal Assistance using Rolling Walker  4. Gait  x 50 feet with Minimal Assistance using Rolling Walker.   5. Lower extremity exercise program x20 reps    RLE WBAT post IM nailing 2024                         Time Tracking:     PT Received On: 24  PT Start Time: 1207     PT Stop Time: 1219  PT Total Time (min): 12 min     Billable Minutes: Gait Training 12    Treatment Type: Treatment  PT/PTA: PT     Number of PTA visits since last PT visit: 0     2024

## 2024-12-29 NOTE — PLAN OF CARE
Problem: Adult Inpatient Plan of Care  Goal: Plan of Care Review  Outcome: Progressing     Problem: Adult Inpatient Plan of Care  Goal: Optimal Comfort and Wellbeing  Outcome: Progressing     Problem: Diabetes Comorbidity  Goal: Blood Glucose Level Within Targeted Range  Outcome: Progressing     Problem: Wound  Goal: Optimal Pain Control and Function  Outcome: Progressing     Patient rested in bed, up to bedside commode with assist. Good urine output during shift. Pain and nausea managed with prn medication. Blood Glucose monitored closely, Coverage provided by prn sliding scale. Patient on Nasal canula 2 L, O2 in place. Normal sinus rhythm on tele. K+ Replaced. Safety maintained and needs attended to.

## 2024-12-29 NOTE — PROGRESS NOTES
[facility] Medicine  Progress Note    Patient Name: Doris Hoover  MRN: [id number]  Patient Class: IP- Inpatient   Admission Date: 12/23/2024  Length of Stay: 6 days  Attending Physician: Beatris Lund MD  Primary Care Provider: Brandyn Alvares IV, MD        Subjective     Principal Problem:Closed displaced intertrochanteric fracture of right femur        HPI:  Doris Hoover is a 64 year female who presented in transfer from [facility] in [place].  She was transferred for orthopedic services.  She was transferred for a displaced right intertrochanteric fracture.  She sustained mechanical fall at residence.  She does not not take anticoagulants.  No head injury or loss of consciousness.  No recent fever chills.  No known sick contacts or travel.  She describes the pain as intense.  She rates the pain at 10/10.  Pain is worse with any movement of the right leg.  Minimal relief with remaining still.  Previous medical history includes asthma, hypertension, interstitial cystitis, diabetes.  Outside facility labs reviewed.  CBC unremarkable.  Glucose 181.  Admitted to Hospital Medicine.  Plans for OR repair in a.m. with Dr. Olson.    Overview/Hospital Course:  64-year-old female with asthma, hypertension, diabetes is admitted after a fall with right displaced intertrochanteric femur fracture.  Orthopedic consulted.  Underwent intramedullary nailing 12/24.  PT/OT consulted. Lovenox for DVT prevention.  1 unit packed red blood cell ordered 12/27. PT/OT recommended high intensity therapy, case management worked on arrangements.    Interval History:  Seen on a.m. rounds.  Pain is  better controlled. Worked with PT just prior to rounds.      Objective:     Vital Signs (Most Recent):  Temp: 98 °F (36.7 °C) (12/29/24 0808)  Pulse: 90 (12/29/24 0808)  Resp: 18 (12/29/24 0808)  BP: (!) 119/58 (12/29/24 0808)  SpO2: (!) 93 % (12/29/24 0808) Vital Signs (24h Range):  Temp:  [97.8 °F  (36.6 °C)-98.2 °F (36.8 °C)] 98 °F (36.7 °C)  Pulse:  [77-91] 90  Resp:  [16-20] 18  SpO2:  [93 %-98 %] 93 %  BP: (119-143)/(58-70) 119/58     Weight: 82.4 kg (181 lb 10.5 oz)  Body mass index is 29.32 kg/m².    Intake/Output Summary (Last 24 hours) at 12/29/2024 1024  Last data filed at 12/29/2024 0515  Gross per 24 hour   Intake 480 ml   Output 1450 ml   Net -970 ml         Physical Exam  Vitals reviewed.   Constitutional:       General: She is not in acute distress.  HENT:      Head: Normocephalic and atraumatic.   Cardiovascular:      Rate and Rhythm: Normal rate and regular rhythm.   Pulmonary:      Effort: Pulmonary effort is normal. No respiratory distress.   Musculoskeletal:      Comments: Dressings to the right lateral thigh C/D/I  RLE NVI   Neurological:      General: No focal deficit present.      Mental Status: She is alert and oriented to person, place, and time. Mental status is at baseline.   Psychiatric:         Mood and Affect: Affect normal.         Behavior: Behavior normal.         Thought Content: Thought content normal.             Significant Labs: All pertinent labs within the past 24 hours have been reviewed.    Significant Imaging: I have reviewed all pertinent imaging results/findings within the past 24 hours.  Review of Systems    Assessment and Plan     * Closed displaced intertrochanteric fracture of right femur  Now status post intramedullary nail 12/24  -p.r.n. pain med  -follow up postop H and H  -IS  -Lovenox for DVT prophylaxis  -PT/OT,  recommend high-intensity therapy  -follow up ortho outpatient in 2 weeks postop    Thrombocytopenia  The likely etiology of thrombocytopenia is platelet consumption from fracture and surgery . The patients 3 most recent labs are listed below.  Recent Labs     12/25/24  0418 12/26/24  0434 12/27/24  0528   * 126* 141*     Plan  - Will transfuse if platelet count is <10k.  - monitor on CBC daily    Acute blood loss anemia  Anemia is likely due  "to acute blood loss which was from fracture and surgery . Most recent hemoglobin and hematocrit are listed below.  Recent Labs     12/26/24  0434 12/27/24  0528 12/28/24  0403   HGB 7.8* 7.1* 8.4*   HCT 25.2* 22.5* 25.9*       Plan  - Monitor serial CBC: Daily  - Transfuse PRBC if patient becomes hemodynamically unstable, symptomatic or hemoglobin drops below 8  - Patient has not received any PRBC transfusions to date  - Patient's anemia is currently  worse  - transfused 1 unit packed red blood cells on 12/27      Postoperative fever  Attributed to atelectasis  -aggressive IS  -resolved    Hypoxia  From atelectasis.  -wean O2 as able  -aggressive incentive spirometry    Type 2 diabetes mellitus, without long-term current use of insulin  Patient's FSGs are controlled on current medication regimen.  Last A1c reviewed- No results found for: "LABA1C", "HGBA1C"  Most recent fingerstick glucose reviewed-   Recent Labs   Lab 12/26/24  1144 12/26/24  1605 12/26/24  1924 12/27/24  0749   POCTGLUCOSE 164* 148* 231* 147*       Current correctional scale  Medium  Maintain anti-hyperglycemic dose as follows-   Antihyperglycemics (From admission, onward)      Start     Stop Route Frequency Ordered    12/23/24 1802  insulin aspart U-100 pen 1-10 Units         -- SubQ Before meals & nightly PRN 12/23/24 1703          Hold Oral hypoglycemics while patient is in the hospital.     Essential hypertension  Holding stable  -continue to hold her amlodipine and atenolol for now and reassess      VTE Risk Mitigation (From admission, onward)           Ordered     enoxaparin injection 40 mg  Every 24 hours         12/27/24 0843     IP VTE HIGH RISK PATIENT  Once         12/23/24 1703     Place sequential compression device  Until discontinued         12/23/24 1703     Place JIMMY hose  Until discontinued         12/23/24 1703                    Discharge Planning   PETER: 12/31/2024     Code Status: Full Code   Medical Readiness for Discharge " Date:   Discharge Plan A: Rehab                Please place Justification for DME        Beatris Lund MD, MD  Department of Hospital Medicine   [facility]/Surg

## 2024-12-29 NOTE — PLAN OF CARE
Problem: Adult Inpatient Plan of Care  Goal: Plan of Care Review  Outcome: Progressing  Goal: Patient-Specific Goal (Individualized)  Outcome: Progressing  Goal: Absence of Hospital-Acquired Illness or Injury  Outcome: Progressing  Goal: Optimal Comfort and Wellbeing  Outcome: Progressing  Goal: Readiness for Transition of Care  Outcome: Progressing     Problem: Diabetes Comorbidity  Goal: Blood Glucose Level Within Targeted Range  Outcome: Progressing     Problem: Skin Injury Risk Increased  Goal: Skin Health and Integrity  Outcome: Progressing     Problem: Infection  Goal: Absence of Infection Signs and Symptoms  Outcome: Progressing     Problem: Wound  Goal: Optimal Coping  Outcome: Progressing  Goal: Optimal Functional Ability  Outcome: Progressing  Goal: Absence of Infection Signs and Symptoms  Outcome: Progressing  Goal: Improved Oral Intake  Outcome: Progressing  Goal: Optimal Pain Control and Function  Outcome: Progressing  Goal: Skin Health and Integrity  Outcome: Progressing  Goal: Optimal Wound Healing  Outcome: Progressing     Problem: Fall Injury Risk  Goal: Absence of Fall and Fall-Related Injury  Outcome: Progressing   Yared diabetic diet. Telemetry--8719--SR. K=3.5- 50meq eff tabs given. L FA SL intact. Voiding well per BSC. Up with walker & CGA X1. R femur with island drsg X3 D/I-- ice PRN. Yared Percocet 5mg for pain. Zofran given X1 early this morning. Sat in recliner for lunch. Daughter supportive. Pleasant.

## 2024-12-29 NOTE — SUBJECTIVE & OBJECTIVE
Interval History:  Seen on a.m. rounds.  Pain is  better controlled. Worked with PT just prior to rounds.      Objective:     Vital Signs (Most Recent):  Temp: 98 °F (36.7 °C) (12/29/24 0808)  Pulse: 90 (12/29/24 0808)  Resp: 18 (12/29/24 0808)  BP: (!) 119/58 (12/29/24 0808)  SpO2: (!) 93 % (12/29/24 0808) Vital Signs (24h Range):  Temp:  [97.8 °F (36.6 °C)-98.2 °F (36.8 °C)] 98 °F (36.7 °C)  Pulse:  [77-91] 90  Resp:  [16-20] 18  SpO2:  [93 %-98 %] 93 %  BP: (119-143)/(58-70) 119/58     Weight: 82.4 kg (181 lb 10.5 oz)  Body mass index is 29.32 kg/m².    Intake/Output Summary (Last 24 hours) at 12/29/2024 1024  Last data filed at 12/29/2024 0515  Gross per 24 hour   Intake 480 ml   Output 1450 ml   Net -970 ml         Physical Exam  Vitals reviewed.   Constitutional:       General: She is not in acute distress.  HENT:      Head: Normocephalic and atraumatic.   Cardiovascular:      Rate and Rhythm: Normal rate and regular rhythm.   Pulmonary:      Effort: Pulmonary effort is normal. No respiratory distress.   Musculoskeletal:      Comments: Dressings to the right lateral thigh C/D/I  RLE NVI   Neurological:      General: No focal deficit present.      Mental Status: She is alert and oriented to person, place, and time. Mental status is at baseline.   Psychiatric:         Mood and Affect: Affect normal.         Behavior: Behavior normal.         Thought Content: Thought content normal.             Significant Labs: All pertinent labs within the past 24 hours have been reviewed.    Significant Imaging: I have reviewed all pertinent imaging results/findings within the past 24 hours.  Review of Systems

## 2024-12-30 VITALS
HEART RATE: 81 BPM | TEMPERATURE: 98 F | DIASTOLIC BLOOD PRESSURE: 60 MMHG | SYSTOLIC BLOOD PRESSURE: 127 MMHG | BODY MASS INDEX: 29.37 KG/M2 | HEIGHT: 66 IN | OXYGEN SATURATION: 97 % | WEIGHT: 182.75 LBS | RESPIRATION RATE: 18 BRPM

## 2024-12-30 LAB
ANION GAP SERPL CALC-SCNC: 10 MMOL/L (ref 8–16)
BUN SERPL-MCNC: 13 MG/DL (ref 8–23)
CALCIUM SERPL-MCNC: 8.8 MG/DL (ref 8.7–10.5)
CHLORIDE SERPL-SCNC: 101 MMOL/L (ref 95–110)
CO2 SERPL-SCNC: 26 MMOL/L (ref 23–29)
CREAT SERPL-MCNC: 0.7 MG/DL (ref 0.5–1.4)
ERYTHROCYTE [DISTWIDTH] IN BLOOD BY AUTOMATED COUNT: 13.7 % (ref 11.5–14.5)
EST. GFR  (NO RACE VARIABLE): >60 ML/MIN/1.73 M^2
GLUCOSE SERPL-MCNC: 133 MG/DL (ref 70–110)
HCT VFR BLD AUTO: 28.6 % (ref 37–48.5)
HGB BLD-MCNC: 9 G/DL (ref 12–16)
MCH RBC QN AUTO: 30.2 PG (ref 27–31)
MCHC RBC AUTO-ENTMCNC: 31.5 G/DL (ref 32–36)
MCV RBC AUTO: 96 FL (ref 82–98)
PLATELET # BLD AUTO: 269 K/UL (ref 150–450)
PMV BLD AUTO: 10.1 FL (ref 9.2–12.9)
POCT GLUCOSE: 135 MG/DL (ref 70–110)
POCT GLUCOSE: 152 MG/DL (ref 70–110)
POCT GLUCOSE: 154 MG/DL (ref 70–110)
POTASSIUM SERPL-SCNC: 4 MMOL/L (ref 3.5–5.1)
RBC # BLD AUTO: 2.98 M/UL (ref 4–5.4)
SODIUM SERPL-SCNC: 137 MMOL/L (ref 136–145)
WBC # BLD AUTO: 6.83 K/UL (ref 3.9–12.7)

## 2024-12-30 PROCEDURE — 36415 COLL VENOUS BLD VENIPUNCTURE: CPT | Performed by: ORTHOPAEDIC SURGERY

## 2024-12-30 PROCEDURE — 99900035 HC TECH TIME PER 15 MIN (STAT)

## 2024-12-30 PROCEDURE — 80048 BASIC METABOLIC PNL TOTAL CA: CPT | Performed by: ORTHOPAEDIC SURGERY

## 2024-12-30 PROCEDURE — 25000003 PHARM REV CODE 250: Performed by: STUDENT IN AN ORGANIZED HEALTH CARE EDUCATION/TRAINING PROGRAM

## 2024-12-30 PROCEDURE — 25000003 PHARM REV CODE 250: Performed by: NURSE PRACTITIONER

## 2024-12-30 PROCEDURE — 94760 N-INVAS EAR/PLS OXIMETRY 1: CPT

## 2024-12-30 PROCEDURE — 97116 GAIT TRAINING THERAPY: CPT

## 2024-12-30 PROCEDURE — 85027 COMPLETE CBC AUTOMATED: CPT | Performed by: ORTHOPAEDIC SURGERY

## 2024-12-30 PROCEDURE — 63600175 PHARM REV CODE 636 W HCPCS: Performed by: STUDENT IN AN ORGANIZED HEALTH CARE EDUCATION/TRAINING PROGRAM

## 2024-12-30 RX ORDER — ENOXAPARIN SODIUM 100 MG/ML
40 INJECTION SUBCUTANEOUS EVERY 24 HOURS
Start: 2024-12-30

## 2024-12-30 RX ORDER — OXYCODONE AND ACETAMINOPHEN 5; 325 MG/1; MG/1
1 TABLET ORAL EVERY 4 HOURS PRN
Start: 2024-12-30

## 2024-12-30 RX ADMIN — OXYCODONE HYDROCHLORIDE AND ACETAMINOPHEN 1 TABLET: 5; 325 TABLET ORAL at 12:12

## 2024-12-30 RX ADMIN — OXYCODONE HYDROCHLORIDE AND ACETAMINOPHEN 1 TABLET: 5; 325 TABLET ORAL at 08:12

## 2024-12-30 RX ADMIN — PANTOPRAZOLE SODIUM 40 MG: 40 TABLET, DELAYED RELEASE ORAL at 08:12

## 2024-12-30 RX ADMIN — HYDROMORPHONE HYDROCHLORIDE 0.5 MG: 1 INJECTION, SOLUTION INTRAMUSCULAR; INTRAVENOUS; SUBCUTANEOUS at 04:12

## 2024-12-30 NOTE — PLAN OF CARE
Per Kristi with [facility], auth obtained and can admit today. Updated provider    1150 per Kristi with [facility], report can be called to [phone]. Transport arranged by Kristi for 3:45 PM        12/30/24 1032   Post-Acute Status   Post-Acute Authorization Placement   Post-Acute Placement Status Set-up Complete/Auth obtained

## 2024-12-30 NOTE — PLAN OF CARE
Problem: Adult Inpatient Plan of Care  Goal: Plan of Care Review  Outcome: Met  Goal: Patient-Specific Goal (Individualized)  Outcome: Met  Goal: Absence of Hospital-Acquired Illness or Injury  Outcome: Met  Goal: Optimal Comfort and Wellbeing  Outcome: Met  Goal: Readiness for Transition of Care  Outcome: Met     Problem: Diabetes Comorbidity  Goal: Blood Glucose Level Within Targeted Range  Outcome: Met     Problem: Skin Injury Risk Increased  Goal: Skin Health and Integrity  Outcome: Met     Problem: Infection  Goal: Absence of Infection Signs and Symptoms  Outcome: Met     Problem: Wound  Goal: Optimal Coping  Outcome: Met  Goal: Optimal Functional Ability  Outcome: Met  Goal: Absence of Infection Signs and Symptoms  Outcome: Met  Goal: Improved Oral Intake  Outcome: Met  Goal: Optimal Pain Control and Function  Outcome: Met  Goal: Skin Health and Integrity  Outcome: Met  Goal: Optimal Wound Healing  Outcome: Met     Problem: Physical Therapy  Goal: Physical Therapy Goal  Description: Goals to be met by: 01-     Patient will increase functional independence with mobility by performin. Supine to sit with MInimal Assistance  2. Sit to stand transfer with Minimal Assistance  3. Bed to chair transfer with Minimal Assistance using Rolling Walker  4. Gait  x 50 feet with Minimal Assistance using Rolling Walker.   5. Lower extremity exercise program x20 reps    RLE WBAT post IM nailing 2024    Outcome: Met     Problem: Occupational Therapy  Goal: Occupational Therapy Goal  Description: Goals to be met by: 25     Patient will increase functional independence with ADLs by performing:    UE Dressing with Modified [place].  LE Dressing with Modified [place].  Grooming while standing at sink with Modified [place].  Toileting from toilet with Modified [place] for hygiene and clothing management.   Bathing from  shower chair/bench with Modified [place].  Toilet transfer to  toilet with Modified [place].  Increased strength and functional activity tolerance for ADL's/IADL's    Outcome: Met     Problem: Fall Injury Risk  Goal: Absence of Fall and Fall-Related Injury  Outcome: Met

## 2024-12-30 NOTE — DISCHARGE INSTRUCTIONS
[facility]/Surg  Facility Transfer Orders        Admit to: Rehab    Diagnoses:   Active Hospital Problems    Diagnosis  POA    *Closed displaced intertrochanteric fracture of right femur [S72.141A]  Yes    Thrombocytopenia [D69.6]  No    Postoperative fever [R50.82]  No    Acute blood loss anemia [D62]  No    Hypoxia [R09.02]  Yes    Type 2 diabetes mellitus, without long-term current use of insulin [E11.9]  Yes    Essential hypertension [I10]  Yes      Resolved Hospital Problems   No resolved problems to display.     Allergies:   Review of patient's allergies indicates:   Allergen Reactions    Butorphanol tartrate Other (See Comments)     Hallucinations  hallucinations      Methylprednisolone Other (See Comments) and Shortness Of Breath     flushing  flushing         Code Status: Full    Vitals: Routine       Diet: diabetic diet: 2000 calorie    Activity: Activity as tolerated    Nursing Precautions: Fall    Bed/Surface: Low Air Loss    Consults: PT to evaluate and treat- 6 times a week and OT to evaluate and treat- 6 times a week    Oxygen: 1 liters/min via nasal cannula    Dialysis: Patient is not on dialysis. Is not            Medications: Discontinue all previous medication orders, if any. See new list below.  Current Discharge Medication List        START taking these medications    Details   enoxaparin (LOVENOX) 40 mg/0.4 mL Syrg Inject 0.4 mLs (40 mg total) into the skin Q24H (prophylaxis, 1700).      oxyCODONE-acetaminophen (PERCOCET) 5-325 mg per tablet Take 1 tablet by mouth every 4 (four) hours as needed.    Comments: n/a   Associated Diagnoses: Hip fracture           CONTINUE these medications which have NOT CHANGED    Details   amLODIPine (NORVASC) 10 MG tablet Take 10 mg by mouth once daily.       atenoloL (TENORMIN) 25 MG tablet Take 25 mg by mouth 2 (two) times daily.      cholecalciferol, vitamin D3, (VITAMIN D3) 25 mcg (1,000 unit) capsule Take 1,000 Units by mouth once daily.       glipiZIDE (GLUCOTROL) 5 MG tablet Take 5 mg by mouth once daily.      magnesium 250 mg Tab Take 1 tablet by mouth once daily.      multivitamin (THERAGRAN) per tablet Take 1 tablet by mouth once daily.      pantoprazole (PROTONIX) 40 MG tablet Take 40 mg by mouth once daily.           STOP taking these medications       acetaminophen (TYLENOL) 650 MG TbSR Comments:   Reason for Stopping:         LORazepam (ATIVAN) 0.5 MG tablet Comments:   Reason for Stopping:         promethazine-dextromethorphan (PROMETHAZINE-DM) 6.25-15 mg/5 mL Syrp Comments:   Reason for Stopping:         rosuvastatin (CRESTOR) 40 MG Tab Comments:   Reason for Stopping:         semaglutide (OZEMPIC) 1 mg/dose (2 mg/1.5 mL) PnIj Comments:   Reason for Stopping:             Follow up:       Immunizations Administered as of 12/30/2024       Name Date Dose VIS Date Route Exp Date    COVID-19, MRNA, LN-S, PF (Moderna) 4/30/2021 0.5 mL -- -- --    Lot: [id number]     External: Auto Reconciled From Outside Source     COVID-19, MRNA, LN-S, PF (Moderna) 4/1/2021 0.5 mL -- -- --    Lot: [id number]     External: Auto Reconciled From Outside Source             Unknown    Some patients may experience side effects after vaccination.  These may include fever, headache, muscle or joint aches.  Most symptoms resolve with 24-48 hours and do not require urgent medical evaluation unless they persist for more than 72 hours or symptoms are concerning for an unrelated medical condition.          Beatris Lund MD, MD

## 2024-12-30 NOTE — PLAN OF CARE
Spoke with Savanna from [facility] [phone] rehab regarding auth. States they have to have verbal confirmation from patient before they can withdraw request. Explained that pt is likely medically clear for DC today and wants to [facility]- asked for them to please call her ASAP to get verbal confirmation so DC is not held up. States she is on the way here to see patient- will withdraw after she speak.       12/30/24 0931   Post-Acute Status   Post-Acute Authorization Placement   Post-Acute Placement Status Pending payor review/awaiting authorization (if required)

## 2024-12-30 NOTE — NURSING
Patient and family noted to be turning bed alarm of and on by themselves. Both educated on importance of not turning the bed alarm off and on and calling staff for assistance as the risk of falling with injury can occur. Patient declined signing bed alarm refusal form at this time.

## 2024-12-30 NOTE — CARE UPDATE
12/30/24 1050   Patient Assessment/Suction   Level of Consciousness (AVPU) alert   Respiratory Effort Normal;Unlabored   PRE-TX-O2   Device (Oxygen Therapy) room air   SpO2 (!) 94 %   Pulse Oximetry Type Intermittent   $ Pulse Oximetry - Single Charge Pulse Oximetry - Single   Pulse (!) 59   Resp 18   Positioning Sitting in chair   Aerosol Therapy   $ Aerosol Therapy Charges PRN treatment not required   Incentive Spirometer   $ Incentive Spirometer Charges done independently per patient   Incentive Spirometer Predicted Level (mL) 1800   Administration (IS) proper technique demonstrated   Number of Repetitions (IS) 8   Level Incentive Spirometer (mL) 2500   Patient Tolerance (IS) good

## 2024-12-30 NOTE — PLAN OF CARE
Plan of care reviewed with patient, verbalized understanding. IV intact and patent with no s/s infection or infiltration noted to site. Meds given per MAR. Pain managed with PRN medication. Ambulated to bathroom with x1 assist. IS at bedside and encouraged use. NAD noted. Purposeful q2hr rounding done. Safety maintained with side rails up x3, bed wheels locked, bed in lowest position, bed alarm set, slip resistant socks maintained, and call light with in reach of patient. Patient educated to call for assistance when needed, verbalized understanding. Patient remains free from falls. No further needs expressed at this time. Will continue to monitor.     Problem: Adult Inpatient Plan of Care  Goal: Plan of Care Review  Outcome: Progressing  Goal: Patient-Specific Goal (Individualized)  Outcome: Progressing  Goal: Absence of Hospital-Acquired Illness or Injury  Outcome: Progressing  Goal: Optimal Comfort and Wellbeing  Outcome: Progressing  Goal: Readiness for Transition of Care  Outcome: Progressing     Problem: Diabetes Comorbidity  Goal: Blood Glucose Level Within Targeted Range  Outcome: Progressing     Problem: Skin Injury Risk Increased  Goal: Skin Health and Integrity  Outcome: Progressing     Problem: Infection  Goal: Absence of Infection Signs and Symptoms  Outcome: Progressing     Problem: Wound  Goal: Optimal Coping  Outcome: Progressing  Goal: Optimal Functional Ability  Outcome: Progressing  Goal: Absence of Infection Signs and Symptoms  Outcome: Progressing  Goal: Improved Oral Intake  Outcome: Progressing  Goal: Optimal Pain Control and Function  Outcome: Progressing  Goal: Skin Health and Integrity  Outcome: Progressing  Goal: Optimal Wound Healing  Outcome: Progressing     Problem: Fall Injury Risk  Goal: Absence of Fall and Fall-Related Injury  Outcome: Progressing

## 2024-12-30 NOTE — DISCHARGE SUMMARY
[facility]  Discharge Summary      Patient Name: Doris Hoover  MRN: [id number]  XOCHITL: [id number]  Patient Class: IP- Inpatient  Admission Date: 12/23/2024  Hospital Length of Stay: 7 days  Discharge Date and Time:  12/30/2024 11:22 AM  Attending Physician: Beatris Lund MD   Discharging Provider: Beatris Lund MD, MD  Primary Care Provider: Brandyn Alvares IV, MD    Primary Care Team: Networked reference to record PCT     HPI:   Doris Hoover is a 64 year female who presented in transfer from [facility] in [place].  She was transferred for orthopedic services.  She was transferred for a displaced right intertrochanteric fracture.  She sustained mechanical fall at residence.  She does not not take anticoagulants.  No head injury or loss of consciousness.  No recent fever chills.  No known sick contacts or travel.  She describes the pain as intense.  She rates the pain at 10/10.  Pain is worse with any movement of the right leg.  Minimal relief with remaining still.  Previous medical history includes asthma, hypertension, interstitial cystitis, diabetes.  Outside facility labs reviewed.  CBC unremarkable.  Glucose 181.  Admitted to Hospital Medicine.  Plans for OR repair in a.m. with Dr. Olson.    Procedure(s) (LRB):  INSERTION, INTRAMEDULLARY MIKEL, FEMUR, TROCHANTER (Right)      Hospital Course:   64-year-old female with asthma, hypertension, diabetes is admitted after a fall with right displaced intertrochanteric femur fracture.  Orthopedic consulted.  Underwent intramedullary nailing 12/24.  PT/OT consulted. Lovenox for DVT prevention.  1 unit packed red blood cell ordered 12/27. PT/OT recommended high intensity therapy, case management worked on arrangements. PT/OT continued to work with patient, and continued to recommend high intensity therapy. The patient was discharged in stable condition to rehab facility.     Goals of Care Treatment  Preferences:  Code Status: Full Code      SDOH Screening:  The patient declined to be screened for utility difficulties, food insecurity, transport difficulties, housing insecurity, and interpersonal safety, so no concerns could be identified this admission.     Consults:   Consults (From admission, onward)          Status Ordering Provider     Inpatient consult to Social Work/Case Management  Once        Provider:  (Not yet assigned)    Acknowledged ALPHONSO ALVAREZ     IP consult to dietary  Once        Provider:  (Not yet assigned)    Completed SHAE FORD            * Closed displaced intertrochanteric fracture of right femur  D/C to rehab    Thrombocytopenia  F/U outpatient    Acute blood loss anemia  F/U outpatient      Postoperative fever  Rersolved    Hypoxia  From atelectasis.  -wean O2 as able      Type 2 diabetes mellitus, without long-term current use of insulin  F/U outpatient    Essential hypertension  Home meds      Final Active Diagnoses:    Diagnosis Date Noted POA    PRINCIPAL PROBLEM:  Closed displaced intertrochanteric fracture of right femur [S72.141A] 12/23/2024 Yes    Thrombocytopenia [D69.6] 12/27/2024 No    Postoperative fever [R50.82] 12/26/2024 No    Acute blood loss anemia [D62] 12/26/2024 No    Hypoxia [R09.02] 12/30/2022 Yes    Type 2 diabetes mellitus, without long-term current use of insulin [E11.9] 11/15/2017 Yes    Essential hypertension [I10] 11/15/2017 Yes      Problems Resolved During this Admission:       Discharged Condition: stable    Disposition: Rehab Facility    Follow Up:    Patient Instructions:      Diet diabetic     Activity as tolerated       Significant Diagnostic Studies: N/A    Pending Diagnostic Studies:       None           Medications:  Reconciled Home Medications:      Medication List        START taking these medications      enoxaparin 40 mg/0.4 mL Syrg  Commonly known as: LOVENOX  Inject 0.4 mLs (40 mg total) into the skin Q24H (prophylaxis, 1700).      oxyCODONE-acetaminophen 5-325 mg per tablet  Commonly known as: PERCOCET  Take 1 tablet by mouth every 4 (four) hours as needed.            CONTINUE taking these medications      amLODIPine 10 MG tablet  Commonly known as: NORVASC  Take 10 mg by mouth once daily.     atenoloL 25 MG tablet  Commonly known as: TENORMIN  Take 25 mg by mouth 2 (two) times daily.     glipiZIDE 5 MG tablet  Commonly known as: GLUCOTROL  Take 5 mg by mouth once daily.     magnesium 250 mg Tab  Take 1 tablet by mouth once daily.     multivitamin per tablet  Commonly known as: THERAGRAN  Take 1 tablet by mouth once daily.     pantoprazole 40 MG tablet  Commonly known as: PROTONIX  Take 40 mg by mouth once daily.     VITAMIN D3 25 mcg (1,000 unit) capsule  Generic drug: cholecalciferol (vitamin D3)  Take 1,000 Units by mouth once daily.            STOP taking these medications      acetaminophen 650 MG Tbsr  Commonly known as: TYLENOL     LORazepam 0.5 MG tablet  Commonly known as: ATIVAN     OZEMPIC 1 mg/dose (2 mg/1.5 mL) Pnij  Generic drug: semaglutide     promethazine-dextromethorphan 6.25-15 mg/5 mL Syrp  Commonly known as: PROMETHAZINE-DM     rosuvastatin 40 MG Tab  Commonly known as: CRESTOR              Indwelling Lines/Drains at time of discharge:   Lines/Drains/Airways       None                   Time spent on the discharge of patient: 35 minutes         Beatris Lund MD, MD  Department of Hospital Medicine  [facility]/Surg

## 2024-12-30 NOTE — PT/OT/SLP PROGRESS
Physical Therapy Treatment    Patient Name:  Doris Hoover   MRN:  [id number]    Recommendations:     Discharge Recommendations: High Intensity Therapy  Discharge Equipment Recommendations: none  Barriers to discharge: Decreased caregiver support    Assessment:     Doris Hoover is a 64 y.o. female admitted with a medical diagnosis of Closed displaced intertrochanteric fracture of right femur.  She presents with the following impairments/functional limitations: weakness, impaired endurance, impaired self care skills, impaired functional mobility, gait instability, impaired balance, decreased lower extremity function, decreased safety awareness, pain, orthopedic precautions .    Pt seen up in chair. Pt stated that she has been to bathroom several times. Pt agreeable to PT and taken at hallways- ambulated with RW 70ft with WBAT RLE with another person following with chair but no seated rest required.  Pt with improving mobility.    Rehab Prognosis: Good; patient would benefit from acute skilled PT services to address these deficits and reach maximum level of function.    Recent Surgery: Procedure(s) (LRB):  INSERTION, INTRAMEDULLARY MIKEL, FEMUR, TROCHANTER (Right) 6 Days Post-Op    Plan:     During this hospitalization, patient to be seen daily to address the identified rehab impairments via gait training, therapeutic activities, therapeutic exercises and progress toward the following goals:    Plan of Care Expires:  01/15/25    Subjective     Chief Complaint: fatigue but willing to walk  Patient/Family Comments/goals: get well  Pain/Comfort:  Pain Rating 1: 0/10      Objective:     Communicated with nurse Leslie prior to session.  Patient found up in chair with oxygen upon PT entry to room.     General Precautions: Standard, fall  Orthopedic Precautions: RLE weight bearing as tolerated  Braces: N/A  Respiratory Status: Room air     Functional Mobility:  Transfers:     Sit to Stand:  contact guard assistance with  rolling walker  Gait: 70ft with RW min assist with chair following      AM-PAC 6 CLICK MOBILITY  Turning over in bed (including adjusting bedclothes, sheets and blankets)?: 3  Sitting down on and standing up from a chair with arms (e.g., wheelchair, bedside commode, etc.): 3  Moving from lying on back to sitting on the side of the bed?: 3  Moving to and from a bed to a chair (including a wheelchair)?: 3  Need to walk in hospital room?: 3  Climbing 3-5 steps with a railing?: 1  Basic Mobility Total Score: 16       Treatment & Education:  Patient was educated on the importance of OOB activity and functional mobility to negate negative effects of prolonged bed rest during hospitalization, safe transfers and ambulation, and D/C planning   OOB chair post PT    Patient left up in chair with all lines intact and call button in reach..    GOALS:   Multidisciplinary Problems       Physical Therapy Goals          Problem: Physical Therapy    Goal Priority Disciplines Outcome Interventions   Physical Therapy Goal     PT, PT/OT Progressing    Description: Goals to be met by: 01-     Patient will increase functional independence with mobility by performin. Supine to sit with MInimal Assistance  2. Sit to stand transfer with Minimal Assistance  3. Bed to chair transfer with Minimal Assistance using Rolling Walker  4. Gait  x 50 feet with Minimal Assistance using Rolling Walker.   5. Lower extremity exercise program x20 reps    RLE WBAT post IM nailing 2024                         Time Tracking:     PT Received On: 24  PT Start Time: 1044     PT Stop Time: 1100  PT Total Time (min): 16 min     Billable Minutes: Gait Training 16    Treatment Type: Treatment  PT/PTA: PT     Number of PTA visits since last PT visit: 0     2024

## 2024-12-30 NOTE — PROGRESS NOTES
[facility]  Progress Note    Patient Name: Doris Hoover  MRN: [id number]  Patient Class: IP- Inpatient   Admission Date: 12/23/2024  Length of Stay: 7 days  Attending Physician: Beatris Lund MD  Primary Care Provider: Brandyn Alvares IV, MD        Subjective     Principal Problem:Closed displaced intertrochanteric fracture of right femur        HPI:  Doris Hoover is a 64 year female who presented in transfer from [facility] in [place].  She was transferred for orthopedic services.  She was transferred for a displaced right intertrochanteric fracture.  She sustained mechanical fall at residence.  She does not not take anticoagulants.  No head injury or loss of consciousness.  No recent fever chills.  No known sick contacts or travel.  She describes the pain as intense.  She rates the pain at 10/10.  Pain is worse with any movement of the right leg.  Minimal relief with remaining still.  Previous medical history includes asthma, hypertension, interstitial cystitis, diabetes.  Outside facility labs reviewed.  CBC unremarkable.  Glucose 181.  Admitted to Hospital Medicine.  Plans for OR repair in a.m. with Dr. Olson.    Overview/Hospital Course:  64-year-old female with asthma, hypertension, diabetes is admitted after a fall with right displaced intertrochanteric femur fracture.  Orthopedic consulted.  Underwent intramedullary nailing 12/24.  PT/OT consulted. Lovenox for DVT prevention.  1 unit packed red blood cell ordered 12/27. PT/OT recommended high intensity therapy, case management worked on arrangements. PT/OT continued to work with patient, and continued to recommend high intensity therapy.    Interval History:  Seen on a.m. rounds.  Pain is  better controlled.       Objective:     Vital Signs (Most Recent):  Temp: 99.1 °F (37.3 °C) (12/30/24 0748)  Pulse: 77 (12/30/24 0748)  Resp: 16 (12/30/24 0823)  BP: (!) 122/58 (12/30/24 0748)  SpO2: 98 %  (12/30/24 0748) Vital Signs (24h Range):  Temp:  [97.8 °F (36.6 °C)-99.1 °F (37.3 °C)] 99.1 °F (37.3 °C)  Pulse:  [71-89] 77  Resp:  [16-18] 16  SpO2:  [93 %-98 %] 98 %  BP: (119-163)/(58-84) 122/58     Weight: 82.9 kg (182 lb 12.2 oz)  Body mass index is 29.5 kg/m².    Intake/Output Summary (Last 24 hours) at 12/30/2024 1020  Last data filed at 12/30/2024 0800  Gross per 24 hour   Intake 1250 ml   Output 1750 ml   Net -500 ml         Physical Exam  Vitals reviewed.   Constitutional:       General: She is not in acute distress.  HENT:      Head: Normocephalic and atraumatic.   Cardiovascular:      Rate and Rhythm: Normal rate and regular rhythm.   Pulmonary:      Effort: Pulmonary effort is normal. No respiratory distress.   Musculoskeletal:      Comments: Dressings to the right lateral thigh C/D/I  RLE NVI   Neurological:      General: No focal deficit present.      Mental Status: She is alert and oriented to person, place, and time. Mental status is at baseline.   Psychiatric:         Mood and Affect: Affect normal.         Behavior: Behavior normal.         Thought Content: Thought content normal.             Significant Labs: All pertinent labs within the past 24 hours have been reviewed.    Significant Imaging: I have reviewed all pertinent imaging results/findings within the past 24 hours.  Review of Systems    Assessment and Plan     * Closed displaced intertrochanteric fracture of right femur  Now status post intramedullary nail 12/24  -p.r.n. pain med  -follow up postop H and H  -IS  -Lovenox for DVT prophylaxis  -PT/OT,  recommend high-intensity therapy  -follow up ortho outpatient in 2 weeks postop    Thrombocytopenia  The likely etiology of thrombocytopenia is platelet consumption from fracture and surgery . The patients 3 most recent labs are listed below.  Recent Labs     12/25/24  0418 12/26/24  0434 12/27/24  0528   * 126* 141*     Plan  - Will transfuse if platelet count is <10k.  - monitor  "on CBC daily    Acute blood loss anemia  Anemia is likely due to acute blood loss which was from fracture and surgery . Most recent hemoglobin and hematocrit are listed below.  Recent Labs     12/26/24  0434 12/27/24  0528 12/28/24  0403   HGB 7.8* 7.1* 8.4*   HCT 25.2* 22.5* 25.9*       Plan  - Monitor serial CBC: Daily  - Transfuse PRBC if patient becomes hemodynamically unstable, symptomatic or hemoglobin drops below 8  - Patient has not received any PRBC transfusions to date  - Patient's anemia is currently  worse  - transfused 1 unit packed red blood cells on 12/27      Postoperative fever  Attributed to atelectasis  -aggressive IS  -resolved    Hypoxia  From atelectasis.  -wean O2 as able  -aggressive incentive spirometry    Type 2 diabetes mellitus, without long-term current use of insulin  Patient's FSGs are controlled on current medication regimen.  Last A1c reviewed- No results found for: "LABA1C", "HGBA1C"  Most recent fingerstick glucose reviewed-   Recent Labs   Lab 12/26/24  1144 12/26/24  1605 12/26/24  1924 12/27/24  0749   POCTGLUCOSE 164* 148* 231* 147*       Current correctional scale  Medium  Maintain anti-hyperglycemic dose as follows-   Antihyperglycemics (From admission, onward)      Start     Stop Route Frequency Ordered    12/23/24 1802  insulin aspart U-100 pen 1-10 Units         -- SubQ Before meals & nightly PRN 12/23/24 1703          Hold Oral hypoglycemics while patient is in the hospital.     Essential hypertension  Holding stable  -continue to hold her amlodipine and atenolol for now and reassess      VTE Risk Mitigation (From admission, onward)           Ordered     enoxaparin injection 40 mg  Every 24 hours         12/27/24 0843     IP VTE HIGH RISK PATIENT  Once         12/23/24 1703     Place sequential compression device  Until discontinued         12/23/24 1703     Place JIMMY hose  Until discontinued         12/23/24 1703                    Discharge Planning   PETER: 12/31/2024    "  Code Status: Full Code   Medical Readiness for Discharge Date:   Discharge Plan A: Rehab                Please place Justification for DME        Beatris Lund MD, MD  Department of Hospital Medicine   [facility]/Surg

## 2024-12-30 NOTE — PLAN OF CARE
Pt clear for DC from case management standpoint. Discharging to [facility]    Requested post op apt with Dr. Olson via in basket, office to contact pt for scheduling     12/30/24 6282   Final Note   Assessment Type Final Discharge Note   Anticipated Discharge Disposition Rehab

## 2024-12-30 NOTE — PROGRESS NOTES
Transporter here to get patient. Patient transferred into wheelchair. Transporting to [facility]. All personal belongings sent with patient and patients daughter.

## 2024-12-30 NOTE — SUBJECTIVE & OBJECTIVE
Interval History:  Seen on a.m. rounds.  Pain is  better controlled.       Objective:     Vital Signs (Most Recent):  Temp: 99.1 °F (37.3 °C) (12/30/24 0748)  Pulse: 77 (12/30/24 0748)  Resp: 16 (12/30/24 0823)  BP: (!) 122/58 (12/30/24 0748)  SpO2: 98 % (12/30/24 0748) Vital Signs (24h Range):  Temp:  [97.8 °F (36.6 °C)-99.1 °F (37.3 °C)] 99.1 °F (37.3 °C)  Pulse:  [71-89] 77  Resp:  [16-18] 16  SpO2:  [93 %-98 %] 98 %  BP: (119-163)/(58-84) 122/58     Weight: 82.9 kg (182 lb 12.2 oz)  Body mass index is 29.5 kg/m².    Intake/Output Summary (Last 24 hours) at 12/30/2024 1020  Last data filed at 12/30/2024 0800  Gross per 24 hour   Intake 1250 ml   Output 1750 ml   Net -500 ml         Physical Exam  Vitals reviewed.   Constitutional:       General: She is not in acute distress.  HENT:      Head: Normocephalic and atraumatic.   Cardiovascular:      Rate and Rhythm: Normal rate and regular rhythm.   Pulmonary:      Effort: Pulmonary effort is normal. No respiratory distress.   Musculoskeletal:      Comments: Dressings to the right lateral thigh C/D/I  RLE NVI   Neurological:      General: No focal deficit present.      Mental Status: She is alert and oriented to person, place, and time. Mental status is at baseline.   Psychiatric:         Mood and Affect: Affect normal.         Behavior: Behavior normal.         Thought Content: Thought content normal.             Significant Labs: All pertinent labs within the past 24 hours have been reviewed.    Significant Imaging: I have reviewed all pertinent imaging results/findings within the past 24 hours.  Review of Systems

## 2025-01-14 ENCOUNTER — HOSPITAL ENCOUNTER (OUTPATIENT)
Dept: RADIOLOGY | Facility: HOSPITAL | Age: 65
Discharge: HOME | End: 2025-01-14
Attending: ORTHOPAEDIC SURGERY
Payer: COMMERCIAL

## 2025-01-14 ENCOUNTER — OFFICE VISIT (OUTPATIENT)
Dept: ORTHOPEDICS | Facility: CLINIC | Age: 65
End: 2025-01-14
Payer: COMMERCIAL

## 2025-01-14 VITALS — WEIGHT: 182.75 LBS | BODY MASS INDEX: 29.37 KG/M2 | HEIGHT: 66 IN

## 2025-01-14 DIAGNOSIS — Z87.81 STATUS POST OPEN REDUCTION AND INTERNAL FIXATION (ORIF) OF FRACTURE: Primary | ICD-10-CM

## 2025-01-14 DIAGNOSIS — Z98.890 STATUS POST OPEN REDUCTION AND INTERNAL FIXATION (ORIF) OF FRACTURE: Primary | ICD-10-CM

## 2025-01-14 PROCEDURE — 99024 POSTOP FOLLOW-UP VISIT: CPT | Mod: S$GLB,,, | Performed by: ORTHOPAEDIC SURGERY

## 2025-01-14 PROCEDURE — 1101F PT FALLS ASSESS-DOCD LE1/YR: CPT | Mod: CPTII,S$GLB,, | Performed by: ORTHOPAEDIC SURGERY

## 2025-01-14 PROCEDURE — 1125F AMNT PAIN NOTED PAIN PRSNT: CPT | Mod: CPTII,S$GLB,, | Performed by: ORTHOPAEDIC SURGERY

## 2025-01-14 PROCEDURE — 1160F RVW MEDS BY RX/DR IN RCRD: CPT | Mod: CPTII,S$GLB,, | Performed by: ORTHOPAEDIC SURGERY

## 2025-01-14 PROCEDURE — 73552 X-RAY EXAM OF FEMUR 2/>: CPT | Mod: TC,PN,RT

## 2025-01-14 PROCEDURE — 73552 X-RAY EXAM OF FEMUR 2/>: CPT | Mod: 26,RT,, | Performed by: RADIOLOGY

## 2025-01-14 PROCEDURE — 99999 PR PBB SHADOW E&M-EST. PATIENT-LVL III: CPT | Mod: PBBFAC,,, | Performed by: ORTHOPAEDIC SURGERY

## 2025-01-14 PROCEDURE — 3288F FALL RISK ASSESSMENT DOCD: CPT | Mod: CPTII,S$GLB,, | Performed by: ORTHOPAEDIC SURGERY

## 2025-01-14 PROCEDURE — 1159F MED LIST DOCD IN RCRD: CPT | Mod: CPTII,S$GLB,, | Performed by: ORTHOPAEDIC SURGERY

## 2025-01-14 RX ORDER — TIZANIDINE 4 MG/1
4 TABLET ORAL EVERY 8 HOURS PRN
COMMUNITY
Start: 2025-01-10 | End: 2025-02-09

## 2025-01-14 NOTE — PROGRESS NOTES
[facility] ORTHOPEDICS POST-OP NOTE    Subjective:           Chief Complaint:   Chief Complaint   Patient presents with    Right Femur - Post-op Evaluation     Status post IM MIKEL 24 doing pretty good, start with PT on Wednesday, have been douing the exercises in the wheelchair; able to walk wit hthe walker pretty good poer the pt       Past Medical History:   Diagnosis Date    Acid reflux     Allergy     Diabetes mellitus, type 2     Hypertension        Past Surgical History:   Procedure Laterality Date     SECTION      3    HYSTERECTOMY      INTRAMEDULLARY RODDING OF TROCHANTER OF FEMUR Right 2024    Procedure: INSERTION, INTRAMEDULLARY MIKEL, FEMUR, TROCHANTER;  Surgeon: Mina Olson MD;  Location: [facility];  Service: Orthopedics;  Laterality: Right;    TONSILLECTOMY         Current Outpatient Medications   Medication Sig    atenoloL (TENORMIN) 25 MG tablet Take 25 mg by mouth 2 (two) times daily.    glipiZIDE (GLUCOTROL) 5 MG tablet Take 5 mg by mouth once daily.    oxyCODONE-acetaminophen (PERCOCET) 5-325 mg per tablet Take 1 tablet by mouth every 4 (four) hours as needed.    tiZANidine (ZANAFLEX) 4 MG tablet Take 4 mg by mouth every 8 (eight) hours as needed.    amLODIPine (NORVASC) 10 MG tablet Take 10 mg by mouth once daily.  (Patient not taking: Reported on 2025)    cholecalciferol, vitamin D3, (VITAMIN D3) 25 mcg (1,000 unit) capsule Take 1,000 Units by mouth once daily. (Patient not taking: Reported on 2025)    enoxaparin (LOVENOX) 40 mg/0.4 mL Syrg Inject 0.4 mLs (40 mg total) into the skin Q24H (prophylaxis, 1700). (Patient not taking: Reported on 2025)    magnesium 250 mg Tab Take 1 tablet by mouth once daily. (Patient not taking: Reported on 2025)    multivitamin (THERAGRAN) per tablet Take 1 tablet by mouth once daily. (Patient not taking: Reported on 2025)     No current facility-administered medications for this visit.       Review of patient's allergies  indicates:   Allergen Reactions    Butorphanol tartrate Other (See Comments)     Hallucinations  hallucinations      Methylprednisolone Other (See Comments) and Shortness Of Breath     flushing  flushing      Penicillins Diarrhea     Diarreha, flushed face, trouble breathing        Family History   Problem Relation Name Age of Onset    Hypertension Mother         Social History     Socioeconomic History    Marital status:    Tobacco Use    Smoking status: Never    Smokeless tobacco: Never   Substance and Sexual Activity    Alcohol use: Not Currently     Social Drivers of Health     Financial Resource Strain: Low Risk  (12/31/2024)    Received from [facility]    Overall Financial Resource Strain (CARDIA)     Difficulty of Paying Living Expenses: Not hard at all   Food Insecurity: No Food Insecurity (12/31/2024)    Received from [facility]    Hunger Vital Sign     Worried About Running Out of Food in the Last Year: Never true     Ran Out of Food in the Last Year: Never true   Transportation Needs: No Transportation Needs (1/10/2025)    Received from [facility]     SDOH Transportation Source     Has lack of transportation kept you from medical appointments or from getting medications?: No     Has lack of transportation kept you from meetings, work, or from getting things needed for daily living?: No   Physical Activity: Insufficiently Active (9/23/2024)    Received from [facility]    Exercise Vital Sign     Days of Exercise per Week: 4 days     Minutes of Exercise per Session: 10 min   Stress: No Stress Concern Present (12/30/2024)    Received from [facility]    [employer] [place] [employer] - Occupational Stress Questionnaire     Feeling of Stress : Not at all   Housing Stability: Low Risk  (12/31/2024)    Received from [facility]    Housing Stability Vital Sign     Unable to Pay for Housing in the Last Year: No     Number of Times Moved in  "the Last Year: 0     Homeless in the Last Year: No       History of present illness:  Patient comes in today 2 weeks status post right femur open reduction internal fixation.  She is doing well.  She has at home.  She has been working with physical therapy.    Review of Systems:    Musculoskeletal:  See HPI      Objective:        Physical Examination:    Vital Signs:  There were no vitals filed for this visit.    Body mass index is 29.5 kg/m².    This a well-developed, well nourished patient in no acute distress.  They are alert and oriented and cooperative to examination.        Right hip exam: Skin to right hip clean dry and intact.  No erythema or ecchymosis.  No signs or symptoms of infection.  Surgical incisions well healed without wound dehiscence or drainage.  Negative Homans on the right.  She can weightbear as tolerated right lower extremity.  She does ambulate with a rolling walker.  She is neurovascularly intact throughout the right lower extremity.    Pertinent New Results:    XRAY Report / Interpretation:   Four views taken of the right femur today: 2 different AP views and 2 different lateral views.  She has a healing right proximal femur intertroch fracture.  She has an intramedullary early placed anjelica with cross fixed screws distally and into the femoral head without evidence of hardware failure.    Assessment/Plan:      1. Status post right femur open reduction internal fixation.      Sutures removed today.  She tolerated this well.  Clean the operative site once a day with warm soapy water.  Do not apply any topical creams or ointments.  Do not submerge underwater for least 48 more hours.  She can weightbear as tolerated right lower extremity.  Continue with physical therapy.  We will check her back in 1 month to assess her progress.    Jesus Harman, Physician Assistant, served in the capacity as a "scribe" for this patient encounter.  A "face-to-face" encounter occurred with Dr. Enriquez " Finger on this date.  The treatment plan and medical decision-making is outlined above. Patient was seen and examined with a chaperone.     This note was created using Dragon voice recognition software that occasionally misinterpreted phrases or words.

## 2025-01-17 DIAGNOSIS — S72.009A HIP FRACTURE: ICD-10-CM

## 2025-01-17 RX ORDER — OXYCODONE AND ACETAMINOPHEN 5; 325 MG/1; MG/1
1 TABLET ORAL EVERY 6 HOURS PRN
Qty: 28 TABLET | Refills: 0 | Status: SHIPPED | OUTPATIENT
Start: 2025-01-17 | End: 2025-01-23 | Stop reason: SDUPTHER

## 2025-01-17 NOTE — TELEPHONE ENCOUNTER
12/24/24- ARCADIO Conteh    Called patient and informed her I would send her request to the provider. Since the office is currently closed, I sent a secure chat to Darius Harman, informing him patient needs a refill.

## 2025-01-17 NOTE — TELEPHONE ENCOUNTER
----- Message from Richard sent at 1/17/2025  2:54 PM CST -----  Name Of Caller: Doris        Provider Name:  Mina Olson        Does patient feel the need to be seen today? no        Relationship to the Pt?: patient        Contact Preference?: [phone]        What is the nature of the call?: Patient states that she would like to speak with someone in the office to get the status of her refill request for oxyCODONE-acetaminophen (PERCOCET) 5-325 mg per tablet. Patient states that she only has 7 tablets left.

## 2025-01-23 DIAGNOSIS — S72.009A HIP FRACTURE: ICD-10-CM

## 2025-01-23 RX ORDER — OXYCODONE AND ACETAMINOPHEN 5; 325 MG/1; MG/1
1 TABLET ORAL EVERY 8 HOURS PRN
Qty: 21 TABLET | Refills: 0 | OUTPATIENT
Start: 2025-01-24 | End: 2025-01-31

## 2025-01-23 RX ORDER — OXYCODONE AND ACETAMINOPHEN 5; 325 MG/1; MG/1
1 TABLET ORAL EVERY 8 HOURS PRN
Qty: 21 TABLET | Refills: 0 | Status: SHIPPED | OUTPATIENT
Start: 2025-01-24 | End: 2025-01-31 | Stop reason: SDUPTHER

## 2025-01-23 NOTE — TELEPHONE ENCOUNTER
----- Message from Silvia sent at 1/23/2025 12:46 PM CST -----  Phone #: [phone]  Patient is requesting a refill of Oxycodone/PLEASE CALL WITH HOW TO TAKE MEDS      Pharmacy:   [id number] - [place], [place] - [address] [facility] [address]  [address]  [place] [address]  Phone: [phone] Fax: [phone]

## 2025-01-23 NOTE — TELEPHONE ENCOUNTER
----- Message from Med Assistant Kelsea sent at 1/17/2025 11:10 AM CST -----  Patient called for her refill of her pain medication.     [id number] - [place], [place] - [address] [facility] [address]   Phone: [phone]  Fax: [phone]

## 2025-01-31 DIAGNOSIS — Z98.890 S/P ORIF (OPEN REDUCTION INTERNAL FIXATION) FRACTURE: Primary | ICD-10-CM

## 2025-01-31 DIAGNOSIS — S72.009A HIP FRACTURE: ICD-10-CM

## 2025-01-31 DIAGNOSIS — Z87.81 S/P ORIF (OPEN REDUCTION INTERNAL FIXATION) FRACTURE: Primary | ICD-10-CM

## 2025-01-31 RX ORDER — OXYCODONE AND ACETAMINOPHEN 5; 325 MG/1; MG/1
1 TABLET ORAL EVERY 8 HOURS PRN
Qty: 21 TABLET | Refills: 0 | Status: SHIPPED | OUTPATIENT
Start: 2025-01-31 | End: 2025-02-07

## 2025-02-06 DIAGNOSIS — Z87.81 S/P ORIF (OPEN REDUCTION INTERNAL FIXATION) FRACTURE: ICD-10-CM

## 2025-02-06 DIAGNOSIS — S72.009A HIP FRACTURE: ICD-10-CM

## 2025-02-06 DIAGNOSIS — Z98.890 S/P ORIF (OPEN REDUCTION INTERNAL FIXATION) FRACTURE: ICD-10-CM

## 2025-02-06 RX ORDER — TIZANIDINE 4 MG/1
4 TABLET ORAL EVERY 8 HOURS PRN
Qty: 21 TABLET | Refills: 0 | Status: CANCELLED | OUTPATIENT
Start: 2025-02-06

## 2025-02-06 RX ORDER — OXYCODONE AND ACETAMINOPHEN 5; 325 MG/1; MG/1
1 TABLET ORAL EVERY 8 HOURS PRN
Qty: 21 TABLET | Refills: 0 | Status: SHIPPED | OUTPATIENT
Start: 2025-02-07 | End: 2025-02-14

## 2025-02-11 ENCOUNTER — OFFICE VISIT (OUTPATIENT)
Dept: ORTHOPEDICS | Facility: CLINIC | Age: 65
End: 2025-02-11
Payer: COMMERCIAL

## 2025-02-11 ENCOUNTER — HOSPITAL ENCOUNTER (OUTPATIENT)
Dept: RADIOLOGY | Facility: HOSPITAL | Age: 65
Discharge: HOME | End: 2025-02-11
Attending: ORTHOPAEDIC SURGERY
Payer: COMMERCIAL

## 2025-02-11 VITALS — HEIGHT: 66 IN | WEIGHT: 152 LBS | BODY MASS INDEX: 24.43 KG/M2

## 2025-02-11 DIAGNOSIS — Z87.81 S/P ORIF (OPEN REDUCTION INTERNAL FIXATION) FRACTURE: Primary | ICD-10-CM

## 2025-02-11 DIAGNOSIS — Z98.890 S/P ORIF (OPEN REDUCTION INTERNAL FIXATION) FRACTURE: Primary | ICD-10-CM

## 2025-02-11 PROCEDURE — 1125F AMNT PAIN NOTED PAIN PRSNT: CPT | Mod: CPTII,S$GLB,, | Performed by: ORTHOPAEDIC SURGERY

## 2025-02-11 PROCEDURE — 1100F PTFALLS ASSESS-DOCD GE2>/YR: CPT | Mod: CPTII,S$GLB,, | Performed by: ORTHOPAEDIC SURGERY

## 2025-02-11 PROCEDURE — 99024 POSTOP FOLLOW-UP VISIT: CPT | Mod: S$GLB,,, | Performed by: ORTHOPAEDIC SURGERY

## 2025-02-11 PROCEDURE — 3288F FALL RISK ASSESSMENT DOCD: CPT | Mod: CPTII,S$GLB,, | Performed by: ORTHOPAEDIC SURGERY

## 2025-02-11 PROCEDURE — 99999 PR PBB SHADOW E&M-EST. PATIENT-LVL III: CPT | Mod: PBBFAC,,, | Performed by: ORTHOPAEDIC SURGERY

## 2025-02-11 PROCEDURE — 73552 X-RAY EXAM OF FEMUR 2/>: CPT | Mod: 26,RT,, | Performed by: RADIOLOGY

## 2025-02-11 PROCEDURE — 1159F MED LIST DOCD IN RCRD: CPT | Mod: CPTII,S$GLB,, | Performed by: ORTHOPAEDIC SURGERY

## 2025-02-11 PROCEDURE — 1160F RVW MEDS BY RX/DR IN RCRD: CPT | Mod: CPTII,S$GLB,, | Performed by: ORTHOPAEDIC SURGERY

## 2025-02-11 PROCEDURE — 73552 X-RAY EXAM OF FEMUR 2/>: CPT | Mod: TC,PN,RT

## 2025-02-11 RX ORDER — DEXTROMETHORPHAN HYDROBROMIDE, GUAIFENESIN 5; 100 MG/5ML; MG/5ML
650 LIQUID ORAL EVERY 8 HOURS
COMMUNITY

## 2025-02-11 RX ORDER — CALC/MAG/B COMPLEX/D3/HERB 61
15 TABLET ORAL DAILY
COMMUNITY

## 2025-02-11 NOTE — PROGRESS NOTES
[facility]    Subjective:     Chief Complaint:   Chief Complaint   Patient presents with    Right Femur - Post-op Evaluation     //XR// Follow up for PO Right IM Mikel 24, doing good, PT is along pretty well, pain is off and on, increased with therapy, she has increased her walking which helped a lot,        Past Medical History:   Diagnosis Date    Acid reflux     Allergy     Diabetes mellitus, type 2     Hypertension        Past Surgical History:   Procedure Laterality Date     SECTION      3    HYSTERECTOMY      INTRAMEDULLARY RODDING OF TROCHANTER OF FEMUR Right 2024    Procedure: INSERTION, INTRAMEDULLARY MIKEL, FEMUR, TROCHANTER;  Surgeon: Mina Olson MD;  Location: [facility];  Service: Orthopedics;  Laterality: Right;    TONSILLECTOMY         Current Outpatient Medications   Medication Sig    atenoloL (TENORMIN) 25 MG tablet Take 25 mg by mouth 2 (two) times daily.    cholecalciferol, vitamin D3, (VITAMIN D3) 25 mcg (1,000 unit) capsule Take 1,000 Units by mouth once daily.    enoxaparin (LOVENOX) 40 mg/0.4 mL Syrg Inject 0.4 mLs (40 mg total) into the skin Q24H (prophylaxis, 1700).    glipiZIDE (GLUCOTROL) 5 MG tablet Take 5 mg by mouth once daily.    lansoprazole (PREVACID 24HR) 15 MG capsule Take 15 mg by mouth once daily.    magnesium 250 mg Tab Take 1 tablet by mouth once daily.    multivitamin (THERAGRAN) per tablet Take 1 tablet by mouth once daily.    oxyCODONE-acetaminophen (PERCOCET) 5-325 mg per tablet Take 1 tablet by mouth every 8 (eight) hours as needed for Pain.    acetaminophen (TYLENOL) 650 MG TbSR Take 650 mg by mouth every 8 (eight) hours.    amLODIPine (NORVASC) 10 MG tablet Take 10 mg by mouth once daily.  (Patient not taking: Reported on 2025)     No current facility-administered medications for this visit.       Review of patient's allergies indicates:   Allergen Reactions    Butorphanol tartrate Other (See Comments)     Hallucinations  hallucinations       Methylprednisolone Other (See Comments) and Shortness Of Breath     flushing  flushing      Penicillins Diarrhea     Diarreha, flushed face, trouble breathing        Family History   Problem Relation Name Age of Onset    Hypertension Mother         Social History     Socioeconomic History    Marital status:    Tobacco Use    Smoking status: Never    Smokeless tobacco: Never   Substance and Sexual Activity    Alcohol use: Not Currently     Social Drivers of Health     Financial Resource Strain: Low Risk  (12/31/2024)    Received from [facility]    Overall Financial Resource Strain (CARDIA)     Difficulty of Paying Living Expenses: Not hard at all   Food Insecurity: No Food Insecurity (12/31/2024)    Received from [facility]    Hunger Vital Sign     Worried About Running Out of Food in the Last Year: Never true     Ran Out of Food in the Last Year: Never true   Transportation Needs: No Transportation Needs (1/10/2025)    Received from [facility]    [facility] Transportation Source     Has lack of transportation kept you from medical appointments or from getting medications?: No     Has lack of transportation kept you from meetings, work, or from getting things needed for daily living?: No   Physical Activity: Insufficiently Active (9/23/2024)    Received from [facility]    Exercise Vital Sign     Days of Exercise per Week: 4 days     Minutes of Exercise per Session: 10 min   Stress: No Stress Concern Present (12/30/2024)    Received from [facility]    [employer] [place] [facility] - Occupational Stress Questionnaire     Feeling of Stress : Not at all   Housing Stability: Low Risk  (12/31/2024)    Received from [facility]    Housing Stability Vital Sign     Unable to Pay for Housing in the Last Year: No     Number of Times Moved in the Last Year: 0     Homeless in the Last Year: No       History of present illness:  64-year-old female, returns  to clinic today status post open reduction internal fixation of a right hip fracture December 24th.  She is here today for routine follow up.  She continues with physical therapy, subjective improvement in her mobility and her pain levels.      Review of Systems:    Constitution: Negative for chills, fever, and sweats.  Negative for unexplained weight loss.    HENT:  Negative for headaches and blurry vision.    Cardiovascular:Negative for chest pain or irregular heart beat. Negative for hypertension.    Respiratory:  Negative for cough and shortness of breath.    Gastrointestinal: Negative for abdominal pain, heartburn, melena, nausea, and vomitting.    Genitourinary:  Negative bladder incontinence and dysuria.    Musculoskeletal:  See HPI for details.     Neurological: Negative for numbness.    Psychiatric/Behavioral: Negative for depression.  The patient is not nervous/anxious.      Endocrine: Negative for polyuria    Hematologic/Lymphatic: Negative for bleeding problem.  Does not bruise/bleed easily.    Skin: Negative for poor would healing and rash    Objective:      Physical Examination:    Vital Signs:  There were no vitals filed for this visit.    Body mass index is 24.53 kg/m².    This a well-developed, well nourished patient in no acute distress.  They are alert and oriented and cooperative to examination.        Examination of the right hip, skin is dry and intact, no erythema or ecchymosis, no signs symptoms of infection.  Gentle range of motion of the right hip without significant pain or discomfort.  Straight leg raise is negative, Homans sign is negative, distally neurovascularly intact.    Examination of the right knee, range motion 0-120 degrees.    Pertinent New Results:    XRAY Report / Interpretation:   AP and lateral views of the right femur taken today in the office demonstrate an intramedullary anjelica with distal locking screw no evidence of heart failure or loosening or subsidence.  She does have  "some medial compartment right knee arthritis with joint space narrowing.    Assessment/Plan:      Stable status post right hip fracture with IM nailing.  She is doing well postoperatively.  She is going with some right knee pain she does have some right knee arthritis on x-ray.  I offered her an injection she politely declined.  Continue with physical therapy follow up in 2 months.    Rafael Biggs, Physician Assistant, served in the capacity as a "scribe" for this patient encounter.  A "face-to-face" encounter occurred with Dr. Mina Olson on this date.  The treatment plan and medical decision-making is outlined above. Patient was seen and examined with a chaperone.       This note was created using Dragon voice recognition software that occasionally misinterpreted phrases or words.          "

## 2025-02-20 DIAGNOSIS — Z87.81 S/P ORIF (OPEN REDUCTION INTERNAL FIXATION) FRACTURE: Primary | ICD-10-CM

## 2025-02-20 DIAGNOSIS — Z98.890 S/P ORIF (OPEN REDUCTION INTERNAL FIXATION) FRACTURE: Primary | ICD-10-CM

## 2025-02-20 RX ORDER — OXYCODONE AND ACETAMINOPHEN 5; 325 MG/1; MG/1
1 TABLET ORAL EVERY 8 HOURS PRN
Qty: 21 TABLET | Refills: 0 | Status: CANCELLED | OUTPATIENT
Start: 2025-02-20

## 2025-02-20 NOTE — TELEPHONE ENCOUNTER
----- Message from Luciano Fine sent at 2/20/2025  3:27 PM CST -----  Patient requesting refill of pain medication to be sent to pharmacy on file

## 2025-02-21 ENCOUNTER — NURSE TRIAGE (OUTPATIENT)
Dept: ADMINISTRATIVE | Facility: CLINIC | Age: 65
End: 2025-02-21
Payer: COMMERCIAL

## 2025-02-21 RX ORDER — HYDROCODONE BITARTRATE AND ACETAMINOPHEN 7.5; 325 MG/1; MG/1
1 TABLET ORAL EVERY 8 HOURS PRN
Qty: 21 TABLET | Refills: 0 | Status: SHIPPED | OUTPATIENT
Start: 2025-02-21 | End: 2025-02-28

## 2025-02-21 NOTE — TELEPHONE ENCOUNTER
Pt wants to know if she can cut her Hydrocodone/Acetaminophen 7.5/325mg in half. Care advice recommends pt receives a callback from MD office today. Please call and advise pt. Pt is awaiting a return call.   Reason for Disposition   Caller has NON-URGENT medicine question about med that PCP or specialist prescribed and triager unable to answer question    Additional Information   Negative: Intentional drug overdose and suicidal thoughts or ideas   Negative: MORE THAN A DOUBLE DOSE of a prescription or over-the-counter (OTC) drug   Negative: DOUBLE DOSE (an extra dose or lesser amount) of prescription drug and any symptoms (e.g., dizziness, nausea, pain, sleepiness)   Negative: DOUBLE DOSE (an extra dose or lesser amount) of over-the-counter (OTC) drug and any symptoms (e.g., dizziness, nausea, pain, sleepiness)   Negative: Took another person's prescription drug   Negative: DOUBLE DOSE (an extra dose or lesser amount) of prescription drug and NO symptoms  (Exception: A double dose of antibiotics.)   Negative: Diabetes drug error or overdose (e.g., took wrong type of insulin or took extra dose)   Negative: Caller has medication question about med NOT prescribed by PCP and triager unable to answer question (e.g., compatibility with other med, storage)   Negative: Prescription not at pharmacy and was prescribed by PCP recently  (Exception: triager has access to EMR and prescription is recorded there. Go to Home Care and confirm for pharmacy.)   Negative: Pharmacy calling with prescription question and triager unable to answer question   Negative: Caller has URGENT medicine question about med that PCP or specialist prescribed and triager unable to answer question    Protocols used: Medication Question Call-A-OH

## 2025-02-24 ENCOUNTER — TELEPHONE (OUTPATIENT)
Dept: ORTHOPEDICS | Facility: CLINIC | Age: 65
End: 2025-02-24
Payer: COMMERCIAL

## 2025-02-24 NOTE — TELEPHONE ENCOUNTER
Called patient regarding below message sent. No answer, voicemail left to return my call about medication.       02/21/2025 - Patient Calls: Airam Bailey RN  (Newest Message First)View All Conversations on this Encounter  Airam Bailey RN to Whitney Mina Staff  (Selected Message)  [facility]      2/21/25  4:24 PM  Hello,  See triage nurses note. Please call and advise pt.   Thanks  Airam Bailey RN  [facility]    2/21/25  4:24 PM  Note  Pt wants to know if she can cut her Hydrocodone/Acetaminophen 7.5/325mg in half. Care advice recommends pt receives a callback from MD office today. Please call and advise pt. Pt is awaiting a return call.   Reason for Disposition   Caller has NON-URGENT medicine question about med that PCP or specialist prescribed and triager unable to answer question    Additional Information   Negative: Intentional drug overdose and suicidal thoughts or ideas   Negative: MORE THAN A DOUBLE DOSE of a prescription or over-the-counter (OTC) drug   Negative: DOUBLE DOSE (an extra dose or lesser amount) of prescription drug and any symptoms (e.g., dizziness, nausea, pain, sleepiness)   Negative: DOUBLE DOSE (an extra dose or lesser amount) of over-the-counter (OTC) drug and any symptoms (e.g., dizziness, nausea, pain, sleepiness)   Negative: Took another person's prescription drug   Negative: DOUBLE DOSE (an extra dose or lesser amount) of prescription drug and NO symptoms  (Exception: A double dose of antibiotics.)   Negative: Diabetes drug error or overdose (e.g., took wrong type of insulin or took extra dose)   Negative: Caller has medication question about med NOT prescribed by PCP and triager unable to answer question (e.g., compatibility with other med, storage)   Negative: Prescription not at pharmacy and was prescribed by PCP recently  (Exception: triager has access to EMR and prescription is recorded there. Go to Home Care and confirm for pharmacy.)   Negative: Pharmacy calling with prescription  question and triager unable to answer question   Negative: Caller has URGENT medicine question about med that PCP or specialist prescribed and triager unable to answer question    Protocols used: Medication Question Call-A-OH

## 2025-02-24 NOTE — TELEPHONE ENCOUNTER
----- Message from Kenzie sent at 2/24/2025  2:14 PM CST -----  Pt is requesting a call back after missing our phone call this  morning.

## 2025-03-05 DIAGNOSIS — Z87.81 S/P ORIF (OPEN REDUCTION INTERNAL FIXATION) FRACTURE: Primary | ICD-10-CM

## 2025-03-05 DIAGNOSIS — Z98.890 S/P ORIF (OPEN REDUCTION INTERNAL FIXATION) FRACTURE: Primary | ICD-10-CM

## 2025-03-05 RX ORDER — HYDROCODONE BITARTRATE AND ACETAMINOPHEN 7.5; 325 MG/1; MG/1
1 TABLET ORAL EVERY 8 HOURS PRN
Qty: 21 TABLET | Refills: 0 | Status: CANCELLED | OUTPATIENT
Start: 2025-03-05

## 2025-03-05 RX ORDER — HYDROCODONE BITARTRATE AND ACETAMINOPHEN 5; 325 MG/1; MG/1
1 TABLET ORAL EVERY 8 HOURS PRN
Qty: 21 TABLET | Refills: 0 | Status: SHIPPED | OUTPATIENT
Start: 2025-03-05

## 2025-03-18 DIAGNOSIS — Z98.890 S/P ORIF (OPEN REDUCTION INTERNAL FIXATION) FRACTURE: ICD-10-CM

## 2025-03-18 DIAGNOSIS — Z87.81 S/P ORIF (OPEN REDUCTION INTERNAL FIXATION) FRACTURE: ICD-10-CM

## 2025-03-18 RX ORDER — HYDROCODONE BITARTRATE AND ACETAMINOPHEN 5; 325 MG/1; MG/1
1 TABLET ORAL EVERY 12 HOURS PRN
Qty: 14 TABLET | Refills: 0 | Status: CANCELLED | OUTPATIENT
Start: 2025-03-18

## 2025-03-18 RX ORDER — TRAMADOL HYDROCHLORIDE 50 MG/1
50 TABLET ORAL EVERY 8 HOURS PRN
Qty: 21 EACH | Refills: 0 | Status: SHIPPED | OUTPATIENT
Start: 2025-03-18 | End: 2025-03-25

## 2025-03-18 NOTE — TELEPHONE ENCOUNTER
----- Message from Luciano Fine sent at 3/18/2025  9:14 AM CDT -----  Patient requesting refill of pain medication to be sent to pharmacy on file

## 2025-03-20 ENCOUNTER — HOSPITAL ENCOUNTER (OUTPATIENT)
Dept: RADIOLOGY | Facility: HOSPITAL | Age: 65
Discharge: HOME | End: 2025-03-20
Attending: ORTHOPAEDIC SURGERY
Payer: COMMERCIAL

## 2025-03-20 ENCOUNTER — OFFICE VISIT (OUTPATIENT)
Dept: ORTHOPEDICS | Facility: CLINIC | Age: 65
End: 2025-03-20
Payer: COMMERCIAL

## 2025-03-20 VITALS — BODY MASS INDEX: 24.41 KG/M2 | HEIGHT: 66 IN | WEIGHT: 151.88 LBS

## 2025-03-20 DIAGNOSIS — Z98.890 S/P ORIF (OPEN REDUCTION INTERNAL FIXATION) FRACTURE: Primary | ICD-10-CM

## 2025-03-20 DIAGNOSIS — Z87.81 S/P ORIF (OPEN REDUCTION INTERNAL FIXATION) FRACTURE: Primary | ICD-10-CM

## 2025-03-20 PROCEDURE — 73552 X-RAY EXAM OF FEMUR 2/>: CPT | Mod: 26,RT,, | Performed by: RADIOLOGY

## 2025-03-20 PROCEDURE — 73552 X-RAY EXAM OF FEMUR 2/>: CPT | Mod: TC,PN,RT

## 2025-03-20 PROCEDURE — 99999 PR PBB SHADOW E&M-EST. PATIENT-LVL III: CPT | Mod: PBBFAC,,, | Performed by: ORTHOPAEDIC SURGERY

## 2025-03-20 RX ORDER — TOLTERODINE 4 MG/1
4 CAPSULE, EXTENDED RELEASE ORAL
COMMUNITY
Start: 2025-03-18 | End: 2026-03-18

## 2025-03-20 RX ORDER — CIPROFLOXACIN 250 MG/1
250 TABLET, FILM COATED ORAL 2 TIMES DAILY
COMMUNITY
Start: 2025-03-18 | End: 2025-03-28

## 2025-03-20 RX ORDER — HYDROCODONE BITARTRATE AND ACETAMINOPHEN 5; 325 MG/1; MG/1
1 TABLET ORAL EVERY 8 HOURS PRN
COMMUNITY
Start: 2025-03-05

## 2025-03-20 RX ORDER — TIZANIDINE 4 MG/1
4 TABLET ORAL EVERY 6 HOURS PRN
COMMUNITY
Start: 2025-02-12

## 2025-03-20 NOTE — PROGRESS NOTES
[facility]     Subjective:    Chief Complaint:   Chief Complaint   Patient presents with    Right Femur - Post-op Evaluation     Follow up for PO right IM Mikel 24, states pain has improved since last visit. Has occasional burning sensation        Past Medical History:   Diagnosis Date    Acid reflux     Allergy     Diabetes mellitus, type 2     Hypertension        Past Surgical History:   Procedure Laterality Date     SECTION      3    HYSTERECTOMY      INTRAMEDULLARY RODDING OF TROCHANTER OF FEMUR Right 2024    Procedure: INSERTION, INTRAMEDULLARY MIKEL, FEMUR, TROCHANTER;  Surgeon: Mina Olson MD;  Location: [facility];  Service: Orthopedics;  Laterality: Right;    TONSILLECTOMY         Current Outpatient Medications   Medication Sig    acetaminophen (TYLENOL) 650 MG TbSR Take 650 mg by mouth every 8 (eight) hours.    atenoloL (TENORMIN) 25 MG tablet Take 25 mg by mouth 2 (two) times daily.    cholecalciferol, vitamin D3, (VITAMIN D3) 25 mcg (1,000 unit) capsule Take 1,000 Units by mouth once daily.    ciprofloxacin HCl (CIPRO) 250 MG tablet Take 250 mg by mouth 2 (two) times daily.    enoxaparin (LOVENOX) 40 mg/0.4 mL Syrg Inject 0.4 mLs (40 mg total) into the skin Q24H (prophylaxis, 1700).    glipiZIDE (GLUCOTROL) 5 MG tablet Take 5 mg by mouth once daily.    HYDROcodone-acetaminophen (NORCO) 5-325 mg per tablet Take 1 tablet by mouth every 8 (eight) hours as needed.    lansoprazole (PREVACID 24HR) 15 MG capsule Take 15 mg by mouth once daily.    magnesium 250 mg Tab Take 1 tablet by mouth once daily.    multivitamin (THERAGRAN) per tablet Take 1 tablet by mouth once daily.    tiZANidine (ZANAFLEX) 4 MG tablet Take 4 mg by mouth every 6 (six) hours as needed.    tolterodine (DETROL LA) 4 MG 24 hr capsule Take 4 mg by mouth.    traMADoL (ULTRAM) 50 mg tablet Take 1 tablet (50 mg total) by mouth every 8 (eight) hours as needed for Pain.    amLODIPine (NORVASC) 10 MG tablet Take 10 mg  by mouth once daily.  (Patient not taking: Reported on 2/11/2025)     No current facility-administered medications for this visit.       Review of patient's allergies indicates:   Allergen Reactions    Butorphanol tartrate Other (See Comments)     Hallucinations  hallucinations      Methylprednisolone Other (See Comments) and Shortness Of Breath     flushing  flushing      Penicillins Diarrhea     Diarreha, flushed face, trouble breathing        Family History   Problem Relation Name Age of Onset    Hypertension Mother         Social History[1]    History of present illness:  64-year-old female about 3 months status post right subtrochanteric fracture of the right hip with long IM anjelica placement.  Here for routine follow-up.  She states that she is progressing well postoperatively with an intermittent dull aching pain in the right thigh although it is manageable.  She has been attending formal physical therapy regularly and feels it has been helping.      Review of Systems:    Constitution: Negative for chills, fever, and sweats.  Negative for unexplained weight loss.    HENT:  Negative for headaches and blurry vision.    Cardiovascular:Negative for chest pain or irregular heart beat. Negative for hypertension.    Respiratory:  Negative for cough and shortness of breath.    Gastrointestinal: Negative for abdominal pain, heartburn, melena, nausea, and vomitting.    Genitourinary:  Negative bladder incontinence and dysuria.    Musculoskeletal:  See HPI for details.    Neurological: Negative for numbness.    Psychiatric/Behavioral: Negative for depression.  The patient is not nervous/anxious.      Endocrine: Negative for polyuria    Hematologic/Lymphatic: Negative for bleeding problem.  Does not bruise/bleed easily.    Skin: Negative for poor would healing and rash    Objective:     Physical Examination:    Vital Signs: VITALS@    Body mass index is 24.52 kg/m².    This a well-developed, well nourished patient in no  "acute distress.  They are alert and oriented and cooperative to examination.        Right hip exam: Skin is dry and intact, no erythema or ecchymosis, no signs symptoms of infection. Gentle range of motion of the right hip without significant pain or discomfort. Straight leg raise is negative, Homans sign is negative, distally neurovascularly intact.     Pertinent New Results:    XRAY Report / Interpretation:  AP and lateral views of the right hip and femur taken today in the office demonstrate an intramedullary anjelica with distal locking screw in proper alignment.  No evidence of heart failure or loosening or subsidence.     Assessment/Plan:     Stable 3 months status post right subtrochanteric fracture of the right hip with long IM anjelica placement performed on December 24, 2024.  She is doing well postoperatively.  We would like her to continue working with physical therapy for increased strength and range of motion of the right hip.  She requested a return to work note for April 4, 2025 with restrictions no standing greater than 15 minutes at a time as well as no walking longer than 200 ft at a time.  She will return to clinic in 6 weeks for repeat x-rays and reassess her symptoms.    Dalton Ledesma, Physician Assistant, served in the capacity as a "scribe" for this patient encounter.  A "face-to-face" encounter occurred with Dr. Mina Olson on this date.  The treatment plan and medical decision-making is outlined above. Patient was seen and examined with a chaperone.     This note was created using Dragon voice recognition software that occasionally misinterpreted phrases or words.       [1]   Social History  Socioeconomic History    Marital status:    Tobacco Use    Smoking status: Never    Smokeless tobacco: Never   Substance and Sexual Activity    Alcohol use: Not Currently     Social Drivers of Health     Financial Resource Strain: Low Risk  (12/31/2024)    Received from [facility]    Overall " Financial Resource Strain (CARDIA)     Difficulty of Paying Living Expenses: Not hard at all   Food Insecurity: No Food Insecurity (12/31/2024)    Received from [facility]    Hunger Vital Sign     Worried About Running Out of Food in the Last Year: Never true     Ran Out of Food in the Last Year: Never true   Transportation Needs: No Transportation Needs (1/10/2025)    Received from [facility]     [facility] Transportation Source     Has lack of transportation kept you from medical appointments or from getting medications?: No     Has lack of transportation kept you from meetings, work, or from getting things needed for daily living?: No   Physical Activity: Insufficiently Active (9/23/2024)    Received from [facility]    Exercise Vital Sign     Days of Exercise per Week: 4 days     Minutes of Exercise per Session: 10 min   Stress: No Stress Concern Present (12/30/2024)    Received from [facility] - Occupational Stress Questionnaire     Feeling of Stress : Not at all   Housing Stability: Low Risk  (12/31/2024)    Received from [facility]    Housing Stability Vital Sign     Unable to Pay for Housing in the Last Year: No     Number of Times Moved in the Last Year: 0     Homeless in the Last Year: No

## 2025-03-20 NOTE — LETTER
March 20, 2025      [facility]  [address]  [address]  [place] [address]  Phone: [phone]  Fax: [phone]       Patient: Doris Hoover   YOB: 1960  Date of Visit: 03/20/2025    To Whom It May Concern:    Maria Elena Hoover  was at [facility] on 03/20/2025. The patient may return to work on 4/4/25 with restrictions. She is unable to walk farther than 200 feet and unable to stand longer 15 minutes. If you have any questions or concerns, or if I can be of further assistance, please do not hesitate to contact me.        Sincerely,      Mina Olson MD

## 2025-04-03 DIAGNOSIS — Z98.890 HISTORY OF OPEN REDUCTION AND INTERNAL FIXATION (ORIF) PROCEDURE: Primary | ICD-10-CM

## 2025-04-03 RX ORDER — TRAMADOL HYDROCHLORIDE 50 MG/1
50 TABLET ORAL EVERY 12 HOURS PRN
Qty: 14 TABLET | Refills: 0 | Status: SHIPPED | OUTPATIENT
Start: 2025-04-03

## 2025-04-03 NOTE — TELEPHONE ENCOUNTER
----- Message from Med Assistant Connor sent at 4/3/2025  2:14 PM CDT -----  Regarding: Rx Refill  IM MIKEL femur 12.23.24 Refill on hydrocodone [facility]

## 2025-05-01 ENCOUNTER — HOSPITAL ENCOUNTER (OUTPATIENT)
Dept: RADIOLOGY | Facility: HOSPITAL | Age: 65
Discharge: HOME | End: 2025-05-01
Attending: ORTHOPAEDIC SURGERY
Payer: COMMERCIAL

## 2025-05-01 ENCOUNTER — OFFICE VISIT (OUTPATIENT)
Dept: ORTHOPEDICS | Facility: CLINIC | Age: 65
End: 2025-05-01
Payer: COMMERCIAL

## 2025-05-01 VITALS — BODY MASS INDEX: 24.41 KG/M2 | HEIGHT: 66 IN | WEIGHT: 151.88 LBS

## 2025-05-01 DIAGNOSIS — Z98.890 HISTORY OF OPEN REDUCTION AND INTERNAL FIXATION (ORIF) PROCEDURE: Primary | ICD-10-CM

## 2025-05-01 PROCEDURE — 99999 PR PBB SHADOW E&M-EST. PATIENT-LVL III: CPT | Mod: PBBFAC,,, | Performed by: ORTHOPAEDIC SURGERY

## 2025-05-01 PROCEDURE — 73552 X-RAY EXAM OF FEMUR 2/>: CPT | Mod: TC,PN,RT

## 2025-05-01 NOTE — LETTER
May 1, 2025      [facility]  [address]  [address]  [place] [address]  Phone: [phone]  Fax: [phone]       Patient: Doris Hoover   YOB: 1960  Date of Visit: 05/01/2025      To Whom It May Concern:    Maria Elena Hoover  was at [facility] on 05/01/2025. The patient may return to work on 5/2/25 with no restrictions. If you have any questions or concerns, or if I can be of further assistance, please do not hesitate to contact me.      Sincerely,    Mina Olson MD

## 2025-05-01 NOTE — PROGRESS NOTES
[facility]    Subjective:     Chief Complaint:   Chief Complaint   Patient presents with    Right Femur - Pain     Follow up for Right Femur IM Mikel 23, states pain has improved since last visit. Has had one incident where she had feeling of leg buckling       Past Medical History:   Diagnosis Date    Acid reflux     Allergy     Diabetes mellitus, type 2     Hypertension        Past Surgical History:   Procedure Laterality Date     SECTION      3    HYSTERECTOMY      INTRAMEDULLARY RODDING OF TROCHANTER OF FEMUR Right 2024    Procedure: INSERTION, INTRAMEDULLARY MIKEL, FEMUR, TROCHANTER;  Surgeon: Mina Olson MD;  Location: [facility];  Service: Orthopedics;  Laterality: Right;    TONSILLECTOMY         Current Outpatient Medications   Medication Sig    acetaminophen (TYLENOL) 650 MG TbSR Take 650 mg by mouth every 8 (eight) hours.    atenoloL (TENORMIN) 25 MG tablet Take 25 mg by mouth 2 (two) times daily.    cholecalciferol, vitamin D3, (VITAMIN D3) 25 mcg (1,000 unit) capsule Take 1,000 Units by mouth once daily.    enoxaparin (LOVENOX) 40 mg/0.4 mL Syrg Inject 0.4 mLs (40 mg total) into the skin Q24H (prophylaxis, 1700).    glipiZIDE (GLUCOTROL) 5 MG tablet Take 5 mg by mouth once daily.    lansoprazole (PREVACID 24HR) 15 MG capsule Take 15 mg by mouth once daily.    magnesium 250 mg Tab Take 1 tablet by mouth once daily.    multivitamin (THERAGRAN) per tablet Take 1 tablet by mouth once daily.    tiZANidine (ZANAFLEX) 4 MG tablet Take 4 mg by mouth every 6 (six) hours as needed.    tolterodine (DETROL LA) 4 MG 24 hr capsule Take 4 mg by mouth.    traMADoL (ULTRAM) 50 mg tablet Take 1 tablet (50 mg total) by mouth every 12 (twelve) hours as needed for Pain.    amLODIPine (NORVASC) 10 MG tablet Take 10 mg by mouth once daily.  (Patient not taking: Reported on 2025)     No current facility-administered medications for this visit.       Review of patient's allergies indicates:    Allergen Reactions    Butorphanol tartrate Other (See Comments)     Hallucinations  hallucinations      Methylprednisolone Other (See Comments) and Shortness Of Breath     flushing  flushing      Penicillins Diarrhea     Diarreha, flushed face, trouble breathing        Family History   Problem Relation Name Age of Onset    Hypertension Mother         Social History[1]    History of present illness:  Patient returns for the right femur.  She is doing well not really having any issues      Review of Systems:    Constitution: Negative for chills, fever, and sweats.  Negative for unexplained weight loss.    HENT:  Negative for headaches and blurry vision.    Cardiovascular:Negative for chest pain or irregular heart beat. Negative for hypertension.    Respiratory:  Negative for cough and shortness of breath.    Gastrointestinal: Negative for abdominal pain, heartburn, melena, nausea, and vomitting.    Genitourinary:  Negative bladder incontinence and dysuria.    Musculoskeletal:  See HPI for details.     Neurological: Negative for numbness.    Psychiatric/Behavioral: Negative for depression.  The patient is not nervous/anxious.      Endocrine: Negative for polyuria    Hematologic/Lymphatic: Negative for bleeding problem.  Does not bruise/bleed easily.    Skin: Negative for poor would healing and rash    Objective:      Physical Examination:    Vital Signs:  There were no vitals filed for this visit.    Body mass index is 24.52 kg/m².    This a well-developed, well nourished patient in no acute distress.  They are alert and oriented and cooperative to examination.        Wounds are clean dry and intact.  Leg lengths are symmetric  Pertinent New Results:    XRAY Report / Interpretation:   All reviewed.  She has consolidated    Assessment/Plan:      Stable following right intramedullary nailing of the femur.  Continue home strengthening.  Follow-up PRN      This note was created using Dragon voice recognition software  that occasionally misinterpreted phrases or words.               [1]   Social History  Socioeconomic History    Marital status:    Tobacco Use    Smoking status: Never    Smokeless tobacco: Never   Substance and Sexual Activity    Alcohol use: Not Currently     Social Drivers of Health     Financial Resource Strain: Low Risk  (12/31/2024)    Received from [facility]    Overall Financial Resource Strain (CARDIA)     Difficulty of Paying Living Expenses: Not hard at all   Food Insecurity: No Food Insecurity (12/31/2024)    Received from [facility]    Hunger Vital Sign     Worried About Running Out of Food in the Last Year: Never true     Ran Out of Food in the Last Year: Never true   Transportation Needs: No Transportation Needs (1/10/2025)    Received from [facility] [place] SDOH Transportation Source     Has lack of transportation kept you from medical appointments or from getting medications?: No     Has lack of transportation kept you from meetings, work, or from getting things needed for daily living?: No   Physical Activity: Insufficiently Active (9/23/2024)    Received from [facility]    Exercise Vital Sign     Days of Exercise per Week: 4 days     Minutes of Exercise per Session: 10 min   Stress: No Stress Concern Present (12/30/2024)    Received from [facility] - Occupational Stress Questionnaire     Feeling of Stress : Not at all   Housing Stability: Low Risk  (12/31/2024)    Received from [facility]    Housing Stability Vital Sign     Unable to Pay for Housing in the Last Year: No     Number of Times Moved in the Last Year: 0     Homeless in the Last Year: No

## (undated) DEVICE — TAPE SILK 3IN

## (undated) DEVICE — DRESSING TRANS 6X8 TEGADERM

## (undated) DEVICE — PACK CUSTOM UNIV BASIN SLI

## (undated) DEVICE — COVER PROXIMA MAYO STAND

## (undated) DEVICE — CLOSURE SKIN STERI STRIP 1/2X4

## (undated) DEVICE — BIT DRILL CANN QC FLEX 16MM

## (undated) DEVICE — SCREW LOK XL25 5X48MM
Type: IMPLANTABLE DEVICE | Site: FEMUR | Status: NON-FUNCTIONAL
Removed: 2024-12-24

## (undated) DEVICE — GOWN POLY REINF BRTH SLV XL

## (undated) DEVICE — REAMER STEPPED DHS DCS

## (undated) DEVICE — APPLICATOR CHLORAPREP ORN 26ML

## (undated) DEVICE — SUT MONOCRYL PLUS UD 3-0 27

## (undated) DEVICE — ELECTRODE RET DUAL PLATE CORD

## (undated) DEVICE — Device

## (undated) DEVICE — BNDG COFLEX FOAM LF2 ST 6X5YD

## (undated) DEVICE — APPLICATOR STERILE 6IN 100/CA

## (undated) DEVICE — GLOVE SURG ULTRA TOUCH 8.5

## (undated) DEVICE — WIRE GUIDE 3.2MM 400MM
Type: IMPLANTABLE DEVICE | Site: FEMUR | Status: NON-FUNCTIONAL
Removed: 2024-12-24

## (undated) DEVICE — DRAPE IOBAN 2 STERI

## (undated) DEVICE — ADHESIVE MASTISOL VIAL 48/BX

## (undated) DEVICE — SOL NACL IRR 1000ML BTL

## (undated) DEVICE — SUT STRATAFIX SPIRAL VIOLET

## (undated) DEVICE — PAD PERI POST REPLACEMNT

## (undated) DEVICE — SPONGE BULKEE II ABSRB 6X6.75

## (undated) DEVICE — DRAPE C ARM 42 X 120 10/BX

## (undated) DEVICE — YANKAUER OPEN TIP W/O VENT

## (undated) DEVICE — BIT DRILL 4.2MM 3 FLUTD 145MM

## (undated) DEVICE — SLEEVE SCD EXPRESS KNEE MEDIUM

## (undated) DEVICE — GLOVE SENSICARE PI GRN 8.5

## (undated) DEVICE — STRAP OR TABLE 5IN X 72IN

## (undated) DEVICE — BIT DRILL CANN TAPERED 10MM

## (undated) DEVICE — PADDING CAST SPECIALIST 6X4YD

## (undated) DEVICE — SUT CTD VICRYL 1 UND BR